# Patient Record
Sex: FEMALE | Race: WHITE | Employment: OTHER | URBAN - METROPOLITAN AREA
[De-identification: names, ages, dates, MRNs, and addresses within clinical notes are randomized per-mention and may not be internally consistent; named-entity substitution may affect disease eponyms.]

---

## 2017-02-07 ENCOUNTER — OFFICE VISIT (OUTPATIENT)
Dept: FAMILY MEDICINE CLINIC | Age: 67
End: 2017-02-07

## 2017-02-07 DIAGNOSIS — D22.9 ATYPICAL NEVI: ICD-10-CM

## 2017-02-07 DIAGNOSIS — I83.813 VARICOSE VEINS OF BOTH LOWER EXTREMITIES WITH PAIN: ICD-10-CM

## 2017-02-07 DIAGNOSIS — Z23 NEED FOR PROPHYLACTIC VACCINATION AGAINST STREPTOCOCCUS PNEUMONIAE (PNEUMOCOCCUS): ICD-10-CM

## 2017-02-07 DIAGNOSIS — G90.A POTS (POSTURAL ORTHOSTATIC TACHYCARDIA SYNDROME): Primary | ICD-10-CM

## 2017-02-07 DIAGNOSIS — Z12.11 SCREENING FOR COLON CANCER: ICD-10-CM

## 2017-02-07 DIAGNOSIS — Z13.820 OSTEOPOROSIS SCREENING: ICD-10-CM

## 2017-02-07 DIAGNOSIS — H65.02 ACUTE SEROUS OTITIS MEDIA OF LEFT EAR, RECURRENCE NOT SPECIFIED: ICD-10-CM

## 2017-02-07 DIAGNOSIS — Z12.31 ENCOUNTER FOR SCREENING MAMMOGRAM FOR BREAST CANCER: ICD-10-CM

## 2017-02-07 PROCEDURE — 90670 PCV13 VACCINE IM: CPT | Performed by: NURSE PRACTITIONER

## 2017-02-07 PROCEDURE — 99203 OFFICE O/P NEW LOW 30 MIN: CPT | Performed by: NURSE PRACTITIONER

## 2017-02-07 PROCEDURE — G0009 ADMIN PNEUMOCOCCAL VACCINE: HCPCS | Performed by: NURSE PRACTITIONER

## 2017-02-07 RX ORDER — CEFDINIR 300 MG/1
300 CAPSULE ORAL 2 TIMES DAILY
Qty: 20 CAPSULE | Refills: 0 | Status: SHIPPED | OUTPATIENT
Start: 2017-02-07 | End: 2017-02-17

## 2017-02-07 ASSESSMENT — ENCOUNTER SYMPTOMS
SWOLLEN GLANDS: 0
SINUS PRESSURE: 1
HOARSE VOICE: 1
SORE THROAT: 0
SHORTNESS OF BREATH: 0

## 2017-02-07 ASSESSMENT — PATIENT HEALTH QUESTIONNAIRE - PHQ9
1. LITTLE INTEREST OR PLEASURE IN DOING THINGS: 0
2. FEELING DOWN, DEPRESSED OR HOPELESS: 0
SUM OF ALL RESPONSES TO PHQ QUESTIONS 1-9: 0
SUM OF ALL RESPONSES TO PHQ9 QUESTIONS 1 & 2: 0

## 2017-02-08 DIAGNOSIS — Z12.11 SCREENING FOR COLON CANCER: Primary | ICD-10-CM

## 2017-02-14 ENCOUNTER — HOSPITAL ENCOUNTER (OUTPATIENT)
Dept: WOMENS IMAGING | Age: 67
Discharge: HOME | End: 2017-02-14
Payer: MEDICARE

## 2017-02-14 VITALS
DIASTOLIC BLOOD PRESSURE: 80 MMHG | SYSTOLIC BLOOD PRESSURE: 134 MMHG | RESPIRATION RATE: 16 BRPM | HEART RATE: 78 BPM | TEMPERATURE: 98.1 F | HEIGHT: 65 IN | OXYGEN SATURATION: 97 % | WEIGHT: 165 LBS | BODY MASS INDEX: 27.49 KG/M2

## 2017-02-14 DIAGNOSIS — Z13.820 OSTEOPOROSIS SCREENING: ICD-10-CM

## 2017-02-14 PROCEDURE — 77080 DXA BONE DENSITY AXIAL: CPT

## 2017-03-01 ENCOUNTER — PROCEDURE VISIT (OUTPATIENT)
Dept: FAMILY MEDICINE CLINIC | Age: 67
End: 2017-03-01

## 2017-03-01 VITALS
TEMPERATURE: 97 F | HEART RATE: 84 BPM | SYSTOLIC BLOOD PRESSURE: 124 MMHG | HEIGHT: 65 IN | DIASTOLIC BLOOD PRESSURE: 84 MMHG | RESPIRATION RATE: 16 BRPM

## 2017-03-01 DIAGNOSIS — D22.9 ATYPICAL NEVI: Primary | ICD-10-CM

## 2017-03-01 DIAGNOSIS — D22.9 ATYPICAL NEVI: ICD-10-CM

## 2017-03-01 PROCEDURE — 12002 RPR S/N/AX/GEN/TRNK2.6-7.5CM: CPT | Performed by: NURSE PRACTITIONER

## 2017-03-01 PROCEDURE — 11301 SHAVE SKIN LESION 0.6-1.0 CM: CPT | Performed by: NURSE PRACTITIONER

## 2017-03-01 RX ORDER — HYDROCODONE BITARTRATE AND ACETAMINOPHEN 5; 325 MG/1; MG/1
1 TABLET ORAL EVERY 6 HOURS PRN
Qty: 18 TABLET | Refills: 0 | Status: SHIPPED | OUTPATIENT
Start: 2017-03-01 | End: 2017-09-30 | Stop reason: ALTCHOICE

## 2017-03-01 RX ORDER — SULFAMETHOXAZOLE AND TRIMETHOPRIM 400; 80 MG/1; MG/1
1 TABLET ORAL 2 TIMES DAILY
Qty: 20 TABLET | Refills: 0 | Status: SHIPPED | OUTPATIENT
Start: 2017-03-01 | End: 2017-03-11

## 2017-03-01 RX ORDER — LIDOCAINE HYDROCHLORIDE AND EPINEPHRINE BITARTRATE 20; .01 MG/ML; MG/ML
3 INJECTION, SOLUTION SUBCUTANEOUS ONCE
Status: DISCONTINUED | OUTPATIENT
Start: 2017-03-01 | End: 2021-05-17

## 2017-03-08 ENCOUNTER — OFFICE VISIT (OUTPATIENT)
Dept: FAMILY MEDICINE CLINIC | Age: 67
End: 2017-03-08

## 2017-03-08 VITALS
RESPIRATION RATE: 16 BRPM | TEMPERATURE: 97.9 F | SYSTOLIC BLOOD PRESSURE: 112 MMHG | HEIGHT: 65 IN | HEART RATE: 80 BPM | DIASTOLIC BLOOD PRESSURE: 74 MMHG

## 2017-03-08 DIAGNOSIS — Z48.02 VISIT FOR SUTURE REMOVAL: ICD-10-CM

## 2017-03-08 DIAGNOSIS — M85.89 OSTEOPENIA OF MULTIPLE SITES: ICD-10-CM

## 2017-03-08 DIAGNOSIS — D04.9 BASAL CELL CARCINOMA IN SITU OF SKIN: Primary | ICD-10-CM

## 2017-03-08 PROCEDURE — 99212 OFFICE O/P EST SF 10 MIN: CPT | Performed by: NURSE PRACTITIONER

## 2017-03-08 RX ORDER — IBUPROFEN AND DIPHENHYDRAMINE HCL 200; 25 MG/1; MG/1
CAPSULE, LIQUID FILLED ORAL
Qty: 60 TABLET | Refills: 10 | Status: SHIPPED | OUTPATIENT
Start: 2017-03-08 | End: 2018-06-04

## 2017-03-10 ENCOUNTER — TELEPHONE (OUTPATIENT)
Dept: FAMILY MEDICINE CLINIC | Age: 67
End: 2017-03-10

## 2017-04-25 ENCOUNTER — TELEPHONE (OUTPATIENT)
Dept: FAMILY MEDICINE CLINIC | Age: 67
End: 2017-04-25

## 2017-04-25 DIAGNOSIS — M25.561 RECURRENT PAIN OF RIGHT KNEE: Primary | ICD-10-CM

## 2017-05-16 ENCOUNTER — HOSPITAL ENCOUNTER (OUTPATIENT)
Dept: ORTHOPEDIC SURGERY | Age: 67
Discharge: HOME | End: 2017-05-16
Payer: MEDICARE

## 2017-05-16 DIAGNOSIS — M25.561 ARTHRALGIA OF RIGHT LOWER LEG: ICD-10-CM

## 2017-05-16 PROCEDURE — 73564 X-RAY EXAM KNEE 4 OR MORE: CPT

## 2017-09-20 LAB — PATHOLOGY REPORT: NORMAL

## 2018-02-06 ENCOUNTER — OFFICE VISIT (OUTPATIENT)
Dept: FAMILY MEDICINE CLINIC | Age: 68
End: 2018-02-06
Payer: MEDICARE

## 2018-02-06 VITALS
BODY MASS INDEX: 28.93 KG/M2 | OXYGEN SATURATION: 98 % | HEART RATE: 81 BPM | HEIGHT: 63 IN | RESPIRATION RATE: 18 BRPM | WEIGHT: 163.3 LBS | SYSTOLIC BLOOD PRESSURE: 148 MMHG | DIASTOLIC BLOOD PRESSURE: 88 MMHG | TEMPERATURE: 97.7 F

## 2018-02-06 DIAGNOSIS — H61.22 IMPACTED CERUMEN OF LEFT EAR: ICD-10-CM

## 2018-02-06 DIAGNOSIS — J01.90 ACUTE SINUSITIS, RECURRENCE NOT SPECIFIED, UNSPECIFIED LOCATION: Primary | ICD-10-CM

## 2018-02-06 DIAGNOSIS — H60.502 ACUTE OTITIS EXTERNA OF LEFT EAR, UNSPECIFIED TYPE: ICD-10-CM

## 2018-02-06 DIAGNOSIS — J20.9 ACUTE BRONCHITIS, UNSPECIFIED ORGANISM: ICD-10-CM

## 2018-02-06 LAB
INFLUENZA A ANTIBODY: NORMAL
INFLUENZA B ANTIBODY: NORMAL

## 2018-02-06 PROCEDURE — 3017F COLORECTAL CA SCREEN DOC REV: CPT | Performed by: FAMILY MEDICINE

## 2018-02-06 PROCEDURE — 4040F PNEUMOC VAC/ADMIN/RCVD: CPT | Performed by: FAMILY MEDICINE

## 2018-02-06 PROCEDURE — G8484 FLU IMMUNIZE NO ADMIN: HCPCS | Performed by: FAMILY MEDICINE

## 2018-02-06 PROCEDURE — 1090F PRES/ABSN URINE INCON ASSESS: CPT | Performed by: FAMILY MEDICINE

## 2018-02-06 PROCEDURE — 1123F ACP DISCUSS/DSCN MKR DOCD: CPT | Performed by: FAMILY MEDICINE

## 2018-02-06 PROCEDURE — 99213 OFFICE O/P EST LOW 20 MIN: CPT | Performed by: FAMILY MEDICINE

## 2018-02-06 PROCEDURE — 3014F SCREEN MAMMO DOC REV: CPT | Performed by: FAMILY MEDICINE

## 2018-02-06 PROCEDURE — 4130F TOPICAL PREP RX AOE: CPT | Performed by: FAMILY MEDICINE

## 2018-02-06 PROCEDURE — 87804 INFLUENZA ASSAY W/OPTIC: CPT | Performed by: FAMILY MEDICINE

## 2018-02-06 PROCEDURE — G8419 CALC BMI OUT NRM PARAM NOF/U: HCPCS | Performed by: FAMILY MEDICINE

## 2018-02-06 PROCEDURE — G8399 PT W/DXA RESULTS DOCUMENT: HCPCS | Performed by: FAMILY MEDICINE

## 2018-02-06 PROCEDURE — 1036F TOBACCO NON-USER: CPT | Performed by: FAMILY MEDICINE

## 2018-02-06 PROCEDURE — G8427 DOCREV CUR MEDS BY ELIG CLIN: HCPCS | Performed by: FAMILY MEDICINE

## 2018-02-06 RX ORDER — AZITHROMYCIN 250 MG/1
TABLET, FILM COATED ORAL
Qty: 1 PACKET | Refills: 0 | Status: SHIPPED | OUTPATIENT
Start: 2018-02-06 | End: 2018-02-16

## 2018-02-06 ASSESSMENT — ENCOUNTER SYMPTOMS
SHORTNESS OF BREATH: 0
ABDOMINAL PAIN: 0
TROUBLE SWALLOWING: 0

## 2018-02-06 NOTE — PROGRESS NOTES
Left Arm, Position: Sitting, Cuff Size: Large Adult)   Pulse 81   Temp 97.7 °F (36.5 °C) (Tympanic)   Resp 18   Ht 5' 3\" (1.6 m)   Wt 163 lb 4.8 oz (74.1 kg)   SpO2 98%   Breastfeeding? No   BMI 28.93 kg/m²     Physical Exam  Heent: T.M normal bilat although left T.M partially occluded with cerumen. Mild pharyngeal injection. No pharyngeal  exudate. Slight tenderness along mastoid of left side. no swelling. Mild tragus tenderness on left without canal swelling or exudate of canal.                    Nares patent but swollen mucosa noted  Neck: Minimal anter cervical adenopathy. No masses. No thyroid asymmetry. Lungs: Clear equal breath sounds. No wheezes or rales. Heart: Rate reg No murmur  Gen: In no acute distress  Assessment:      1. Acute sinusitis, recurrence not specified, unspecified location     2. Acute bronchitis, unspecified organism     3. Impacted cerumen of left ear     4. Acute otitis externa of left ear, unspecified type           Plan:      Orders Placed This Encounter   Procedures    POCT Influenza A/B     Orders Placed This Encounter   Medications    azithromycin (ZITHROMAX) 250 MG tablet     Sig: Take 2 tabs (500 mg) on Day 1, and take 1 tab (250 mg) on days 2 through 5.      Dispense:  1 packet     Refill:  0    neomycin-colistin-hydrocortisone-thonzonium (CORTISPORIN-TC) 3.3-3-10-0.5 MG/ML otic suspension     Sig: Place 4 drops into the left ear 3 times daily for 10 days     Dispense:  1 Bottle     Refill:  0   f/u in 10 days if needed with primary

## 2018-04-16 PROBLEM — L81.4 LENTIGO: Status: ACTIVE | Noted: 2017-04-18

## 2018-04-17 ENCOUNTER — OFFICE VISIT (OUTPATIENT)
Dept: FAMILY MEDICINE CLINIC | Age: 68
End: 2018-04-17
Payer: MEDICARE

## 2018-04-17 ENCOUNTER — TELEPHONE (OUTPATIENT)
Dept: FAMILY MEDICINE CLINIC | Age: 68
End: 2018-04-17

## 2018-04-17 VITALS
DIASTOLIC BLOOD PRESSURE: 86 MMHG | SYSTOLIC BLOOD PRESSURE: 138 MMHG | WEIGHT: 168 LBS | HEIGHT: 63 IN | BODY MASS INDEX: 29.77 KG/M2 | HEART RATE: 60 BPM

## 2018-04-17 DIAGNOSIS — L23.7 PLANT ALLERGIC CONTACT DERMATITIS: ICD-10-CM

## 2018-04-17 DIAGNOSIS — Z23 NEED FOR PNEUMOCOCCAL VACCINATION: ICD-10-CM

## 2018-04-17 DIAGNOSIS — F43.23 ADJUSTMENT DISORDER WITH MIXED ANXIETY AND DEPRESSED MOOD: Primary | ICD-10-CM

## 2018-04-17 PROCEDURE — 99214 OFFICE O/P EST MOD 30 MIN: CPT | Performed by: NURSE PRACTITIONER

## 2018-04-17 PROCEDURE — 4040F PNEUMOC VAC/ADMIN/RCVD: CPT | Performed by: NURSE PRACTITIONER

## 2018-04-17 PROCEDURE — 1123F ACP DISCUSS/DSCN MKR DOCD: CPT | Performed by: NURSE PRACTITIONER

## 2018-04-17 PROCEDURE — G8427 DOCREV CUR MEDS BY ELIG CLIN: HCPCS | Performed by: NURSE PRACTITIONER

## 2018-04-17 PROCEDURE — 1090F PRES/ABSN URINE INCON ASSESS: CPT | Performed by: NURSE PRACTITIONER

## 2018-04-17 PROCEDURE — 90732 PPSV23 VACC 2 YRS+ SUBQ/IM: CPT | Performed by: NURSE PRACTITIONER

## 2018-04-17 PROCEDURE — G0009 ADMIN PNEUMOCOCCAL VACCINE: HCPCS | Performed by: NURSE PRACTITIONER

## 2018-04-17 PROCEDURE — G8399 PT W/DXA RESULTS DOCUMENT: HCPCS | Performed by: NURSE PRACTITIONER

## 2018-04-17 PROCEDURE — 3017F COLORECTAL CA SCREEN DOC REV: CPT | Performed by: NURSE PRACTITIONER

## 2018-04-17 PROCEDURE — G8419 CALC BMI OUT NRM PARAM NOF/U: HCPCS | Performed by: NURSE PRACTITIONER

## 2018-04-17 PROCEDURE — 1036F TOBACCO NON-USER: CPT | Performed by: NURSE PRACTITIONER

## 2018-04-17 RX ORDER — VILAZODONE HYDROCHLORIDE 20 MG/1
20 TABLET ORAL DAILY
Qty: 30 TABLET | Refills: 5 | Status: SHIPPED | OUTPATIENT
Start: 2018-04-17 | End: 2018-06-04

## 2018-04-17 RX ORDER — FLUOXETINE 20 MG/1
20 TABLET, FILM COATED ORAL DAILY
Qty: 30 TABLET | Refills: 3 | Status: SHIPPED | OUTPATIENT
Start: 2018-04-17 | End: 2018-06-04

## 2018-04-17 ASSESSMENT — PATIENT HEALTH QUESTIONNAIRE - PHQ9
SUM OF ALL RESPONSES TO PHQ QUESTIONS 1-9: 2
2. FEELING DOWN, DEPRESSED OR HOPELESS: 1
SUM OF ALL RESPONSES TO PHQ9 QUESTIONS 1 & 2: 2
1. LITTLE INTEREST OR PLEASURE IN DOING THINGS: 1

## 2018-04-20 RX ORDER — CITALOPRAM 10 MG/1
10 TABLET ORAL DAILY
Qty: 30 TABLET | Refills: 3 | Status: SHIPPED | OUTPATIENT
Start: 2018-04-20 | End: 2018-04-20 | Stop reason: SDUPTHER

## 2018-04-20 RX ORDER — CITALOPRAM 10 MG/1
10 TABLET ORAL DAILY
Qty: 90 TABLET | Refills: 3 | Status: SHIPPED | OUTPATIENT
Start: 2018-04-20 | End: 2018-08-17 | Stop reason: SDUPTHER

## 2018-04-25 ASSESSMENT — ENCOUNTER SYMPTOMS: CHEST TIGHTNESS: 0

## 2018-05-27 ENCOUNTER — OFFICE VISIT (OUTPATIENT)
Dept: FAMILY MEDICINE CLINIC | Age: 68
End: 2018-05-27
Payer: MEDICARE

## 2018-05-27 VITALS
OXYGEN SATURATION: 98 % | BODY MASS INDEX: 30.12 KG/M2 | HEART RATE: 69 BPM | HEIGHT: 63 IN | DIASTOLIC BLOOD PRESSURE: 70 MMHG | WEIGHT: 170 LBS | SYSTOLIC BLOOD PRESSURE: 122 MMHG | TEMPERATURE: 98.6 F

## 2018-05-27 DIAGNOSIS — T78.40XA ALLERGIC REACTION, INITIAL ENCOUNTER: Primary | ICD-10-CM

## 2018-05-27 PROCEDURE — 1090F PRES/ABSN URINE INCON ASSESS: CPT | Performed by: NURSE PRACTITIONER

## 2018-05-27 PROCEDURE — G8399 PT W/DXA RESULTS DOCUMENT: HCPCS | Performed by: NURSE PRACTITIONER

## 2018-05-27 PROCEDURE — 96372 THER/PROPH/DIAG INJ SC/IM: CPT | Performed by: NURSE PRACTITIONER

## 2018-05-27 PROCEDURE — 4040F PNEUMOC VAC/ADMIN/RCVD: CPT | Performed by: NURSE PRACTITIONER

## 2018-05-27 PROCEDURE — 99213 OFFICE O/P EST LOW 20 MIN: CPT | Performed by: NURSE PRACTITIONER

## 2018-05-27 PROCEDURE — 1123F ACP DISCUSS/DSCN MKR DOCD: CPT | Performed by: NURSE PRACTITIONER

## 2018-05-27 PROCEDURE — 1036F TOBACCO NON-USER: CPT | Performed by: NURSE PRACTITIONER

## 2018-05-27 PROCEDURE — 3017F COLORECTAL CA SCREEN DOC REV: CPT | Performed by: NURSE PRACTITIONER

## 2018-05-27 PROCEDURE — G8427 DOCREV CUR MEDS BY ELIG CLIN: HCPCS | Performed by: NURSE PRACTITIONER

## 2018-05-27 PROCEDURE — G8417 CALC BMI ABV UP PARAM F/U: HCPCS | Performed by: NURSE PRACTITIONER

## 2018-05-27 RX ORDER — TRIAMCINOLONE ACETONIDE 40 MG/ML
40 INJECTION, SUSPENSION INTRA-ARTICULAR; INTRAMUSCULAR ONCE
Status: COMPLETED | OUTPATIENT
Start: 2018-05-27 | End: 2018-05-27

## 2018-05-27 RX ADMIN — TRIAMCINOLONE ACETONIDE 40 MG: 40 INJECTION, SUSPENSION INTRA-ARTICULAR; INTRAMUSCULAR at 13:34

## 2018-05-27 ASSESSMENT — ENCOUNTER SYMPTOMS
CHANGE IN BOWEL HABIT: 0
VOMITING: 0

## 2018-06-04 ENCOUNTER — OFFICE VISIT (OUTPATIENT)
Dept: FAMILY MEDICINE CLINIC | Age: 68
End: 2018-06-04
Payer: MEDICARE

## 2018-06-04 VITALS
HEART RATE: 78 BPM | WEIGHT: 153 LBS | DIASTOLIC BLOOD PRESSURE: 84 MMHG | SYSTOLIC BLOOD PRESSURE: 128 MMHG | RESPIRATION RATE: 20 BRPM | BODY MASS INDEX: 27.11 KG/M2 | TEMPERATURE: 98.4 F | OXYGEN SATURATION: 99 % | HEIGHT: 63 IN

## 2018-06-04 DIAGNOSIS — J01.90 ACUTE SINUSITIS, RECURRENCE NOT SPECIFIED, UNSPECIFIED LOCATION: ICD-10-CM

## 2018-06-04 DIAGNOSIS — M85.89 OSTEOPENIA OF MULTIPLE SITES: ICD-10-CM

## 2018-06-04 DIAGNOSIS — J20.9 ACUTE BRONCHITIS, UNSPECIFIED ORGANISM: Primary | ICD-10-CM

## 2018-06-04 DIAGNOSIS — G90.A POTS (POSTURAL ORTHOSTATIC TACHYCARDIA SYNDROME): ICD-10-CM

## 2018-06-04 DIAGNOSIS — J98.9 REACTIVE AIRWAY DISEASE THAT IS NOT ASTHMA: ICD-10-CM

## 2018-06-04 PROCEDURE — 99214 OFFICE O/P EST MOD 30 MIN: CPT | Performed by: NURSE PRACTITIONER

## 2018-06-04 PROCEDURE — G8399 PT W/DXA RESULTS DOCUMENT: HCPCS | Performed by: NURSE PRACTITIONER

## 2018-06-04 PROCEDURE — G8417 CALC BMI ABV UP PARAM F/U: HCPCS | Performed by: NURSE PRACTITIONER

## 2018-06-04 PROCEDURE — 3017F COLORECTAL CA SCREEN DOC REV: CPT | Performed by: NURSE PRACTITIONER

## 2018-06-04 PROCEDURE — 1036F TOBACCO NON-USER: CPT | Performed by: NURSE PRACTITIONER

## 2018-06-04 PROCEDURE — 1123F ACP DISCUSS/DSCN MKR DOCD: CPT | Performed by: NURSE PRACTITIONER

## 2018-06-04 PROCEDURE — 1090F PRES/ABSN URINE INCON ASSESS: CPT | Performed by: NURSE PRACTITIONER

## 2018-06-04 PROCEDURE — G8427 DOCREV CUR MEDS BY ELIG CLIN: HCPCS | Performed by: NURSE PRACTITIONER

## 2018-06-04 PROCEDURE — 4040F PNEUMOC VAC/ADMIN/RCVD: CPT | Performed by: NURSE PRACTITIONER

## 2018-06-04 RX ORDER — MOXIFLOXACIN HYDROCHLORIDE 400 MG/1
400 TABLET ORAL DAILY
Qty: 10 TABLET | Refills: 0 | Status: SHIPPED | OUTPATIENT
Start: 2018-06-04 | End: 2018-06-14

## 2018-06-04 RX ORDER — FLUTICASONE PROPIONATE 50 MCG
2 SPRAY, SUSPENSION (ML) NASAL DAILY
Qty: 1 BOTTLE | Refills: 3 | Status: SHIPPED | OUTPATIENT
Start: 2018-06-04 | End: 2018-06-04 | Stop reason: SDUPTHER

## 2018-06-04 RX ORDER — METHYLPREDNISOLONE 4 MG/1
TABLET ORAL
Qty: 1 KIT | Refills: 0 | Status: SHIPPED | OUTPATIENT
Start: 2018-06-04 | End: 2018-06-10

## 2018-06-04 RX ORDER — ALBUTEROL SULFATE 90 UG/1
2 AEROSOL, METERED RESPIRATORY (INHALATION) EVERY 6 HOURS PRN
Qty: 1 INHALER | Refills: 0 | Status: SHIPPED | OUTPATIENT
Start: 2018-06-04 | End: 2021-02-19 | Stop reason: ALTCHOICE

## 2018-06-04 RX ORDER — GUAIFENESIN AND CODEINE PHOSPHATE 100; 10 MG/5ML; MG/5ML
5 SOLUTION ORAL 4 TIMES DAILY PRN
Qty: 118 ML | Refills: 0 | Status: SHIPPED | OUTPATIENT
Start: 2018-06-04 | End: 2018-06-11

## 2018-06-04 RX ORDER — FLUTICASONE PROPIONATE 50 MCG
1 SPRAY, SUSPENSION (ML) NASAL DAILY
Qty: 1 BOTTLE | Refills: 0 | Status: SHIPPED | OUTPATIENT
Start: 2018-06-04 | End: 2019-05-10 | Stop reason: SDUPTHER

## 2018-06-04 RX ORDER — FLUTICASONE PROPIONATE 50 MCG
SPRAY, SUSPENSION (ML) NASAL
Refills: 3 | OUTPATIENT
Start: 2018-06-04

## 2018-06-04 ASSESSMENT — ENCOUNTER SYMPTOMS
SORE THROAT: 0
COUGH: 1
HOARSE VOICE: 0
VOMITING: 0
SHORTNESS OF BREATH: 0
SINUS PRESSURE: 1
NAUSEA: 0
SINUS PAIN: 0
SWOLLEN GLANDS: 0
WHEEZING: 1

## 2018-08-08 ENCOUNTER — HOSPITAL ENCOUNTER (OUTPATIENT)
Dept: ORTHOPEDIC SURGERY | Age: 68
Discharge: HOME | End: 2018-08-10
Payer: MEDICARE

## 2018-08-08 DIAGNOSIS — M25.519 ARTHRALGIA OF SHOULDER, UNSPECIFIED LATERALITY: ICD-10-CM

## 2018-08-08 PROCEDURE — 73030 X-RAY EXAM OF SHOULDER: CPT

## 2018-08-17 RX ORDER — CITALOPRAM 10 MG/1
10 TABLET ORAL DAILY
Qty: 90 TABLET | Refills: 3 | Status: SHIPPED | OUTPATIENT
Start: 2018-08-17 | End: 2019-08-20 | Stop reason: SDUPTHER

## 2018-08-24 ENCOUNTER — HOSPITAL ENCOUNTER (OUTPATIENT)
Dept: ORTHOPEDIC SURGERY | Age: 68
Discharge: HOME | End: 2018-08-26
Payer: MEDICARE

## 2018-08-24 DIAGNOSIS — M25.561 ARTHRALGIA OF RIGHT LOWER LEG: ICD-10-CM

## 2018-08-24 PROCEDURE — 73564 X-RAY EXAM KNEE 4 OR MORE: CPT

## 2018-08-27 ENCOUNTER — HOSPITAL ENCOUNTER (OUTPATIENT)
Dept: PHYSICAL THERAPY | Age: 68
Setting detail: THERAPIES SERIES
Discharge: HOME | End: 2018-08-27
Payer: MEDICARE

## 2018-08-27 PROCEDURE — G0283 ELEC STIM OTHER THAN WOUND: HCPCS

## 2018-08-27 PROCEDURE — 97162 PT EVAL MOD COMPLEX 30 MIN: CPT

## 2018-08-27 PROCEDURE — 97110 THERAPEUTIC EXERCISES: CPT

## 2018-08-27 PROCEDURE — G8985 CARRY GOAL STATUS: HCPCS

## 2018-08-27 PROCEDURE — G8984 CARRY CURRENT STATUS: HCPCS

## 2018-08-27 ASSESSMENT — PAIN DESCRIPTION - PAIN TYPE: TYPE: ACUTE PAIN

## 2018-08-27 ASSESSMENT — PAIN DESCRIPTION - LOCATION: LOCATION: SHOULDER

## 2018-08-27 ASSESSMENT — PAIN SCALES - GENERAL: PAINLEVEL_OUTOF10: 5

## 2018-08-27 ASSESSMENT — PAIN DESCRIPTION - ORIENTATION: ORIENTATION: RIGHT

## 2018-08-27 ASSESSMENT — PAIN DESCRIPTION - DESCRIPTORS: DESCRIPTORS: ACHING

## 2018-08-27 NOTE — PROGRESS NOTES
[address]  PHYSICAL THERAPY EVALUATION    Date: 2018  Patient Name: Rodrigue Mazariegos       MRN: [id number]   Account: [de-identified]   : 1950  (76 y.o.)   Gender: female   Referring Practitioner: Dhaval Lynne                 Diagnosis: R Shldr Bursistis ( s/p injection 18)  Treatment Diagnosis: R Shldr Pain; Impaired Strength; Impaired ROM                Past Medical History:  has no past medical history on file. Past Surgical History:   has no past surgical history on file. 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 Additional Comments: denies falls        Subjective:  Subjective: Over 1 mo of sudden onset of R shldr Pain; Pain in R UT, suprascap area, next day unable to lift arm, tried DTM, sume tnederness in deltoid area; Needs to hold UT when coughs or sneezes; Injection into (subacromial bursa) w/ improved ROM but cont difficulty raising arm w/ holding anything. Comments: Pt works as  assistant at [employer]: GHJ: Flex 4-/5; Abd 4/5;  ER 4-/5;  IR 4+/5     Strength Other  Other: R: Rhmbd 3+/5;  MT 4/5;  LT 4/5 ;   L: Rhmbd, MT 4-/5. LT 4/5                PROM RUE (degrees)  RUE General PROM: GHJ flex and ABD WFL      AROM RUE (degrees)  RUE General AROM: GHJ: Flex 134;   Abd 105; and Handling Objects Current Status (): At least 20 percent but less than 40 percent impaired, limited or restricted  Carrying, Moving and Handling Objects Goal Status (): At least 1 percent but less than 20 percent impaired, limited or restricted    Patient Education  New Education Provided: written HEP    POST-PAIN     Pain Rating (0-10 pain scale):  5 /10  Location and pain description same as pre-treatment unless indicated. Action: [] NA  [] Call Physician  [] Perform HEP  [] Meds as prescribed    Evaluation and patient rights have been reviewed and patient agrees with plan of care. Yes  [x]  No  []   Explain:       Sommers Fall Risk Assessment  Risk Factor Scale  Score   History of Falls [] Yes  [] No 25  0 0   Secondary Diagnosis [] Yes  [] No 15  0 0   Ambulatory Aid [] Furniture  [] Crutches/cane/walker  [] None/bedrest/wheelchair/nurse 30  15  0 0   IV/Heparin Lock [] Yes  [] No 20  0 0   Gait/Transferring [] Impaired  [] Weak  [] Normal/bedrest/immobile 20  10  0 0   Mental Status [] Forgets limitations  [] Oriented to own ability 15  0 0      Total:0     Based on the Assessment score: check the appropriate box. [x]  No intervention needed   Low =   Score of 0-24  []  Use standard prevention interventions Moderate =  Score of 24-44   [] Discuss fall prevention strategies   [] Indicate moderate falls risk on eval  []  Use high risk prevention interventions High = Score of 45 and higher   [] Discuss fall prevention strategies   [] Provide supervision during treatment time    Goals  Short term goals  Time Frame for Short term goals: 4 visits   Short term goal 1: I w/ initial HEP  Long term goals  Time Frame for Long term goals : 8-10 visits  Long term goal 1: Dec R shldr pain to </= 4/10 at worse  Long term goal 2:  Inc R GHJ Flex and ABD to >/= 140 and ER to >/= 70 deg  to improve overhead and job duties  Long term goal 3: inc strength R GHJ to >/= 4+/5 flex, Abd, ER to improve household duties  Long term goal 4: Inc strength B rhmbd,and MT to >/= 4/5 to dec pain w. overhead activites  Long term goal 5:  Inc UEFI to >/= 70/80         PT Individual Minutes  Time In: 5993  Time Out: 1001  Minutes: 58  Timed Code Treatment Minutes: 9 Minutes  Procedure Minutes:41    Electronically signed by Torres Amezcua, PT on 8/27/18 at 12:26 PM

## 2018-08-31 ENCOUNTER — HOSPITAL ENCOUNTER (OUTPATIENT)
Dept: PHYSICAL THERAPY | Age: 68
Setting detail: THERAPIES SERIES
Discharge: HOME | End: 2018-08-31
Payer: MEDICARE

## 2018-08-31 PROCEDURE — G0283 ELEC STIM OTHER THAN WOUND: HCPCS

## 2018-08-31 PROCEDURE — 97110 THERAPEUTIC EXERCISES: CPT

## 2018-08-31 PROCEDURE — 97140 MANUAL THERAPY 1/> REGIONS: CPT

## 2018-08-31 ASSESSMENT — PAIN DESCRIPTION - LOCATION: LOCATION: SHOULDER

## 2018-08-31 ASSESSMENT — PAIN DESCRIPTION - DESCRIPTORS: DESCRIPTORS: ACHING

## 2018-08-31 ASSESSMENT — PAIN SCALES - GENERAL: PAINLEVEL_OUTOF10: 8

## 2018-08-31 ASSESSMENT — PAIN DESCRIPTION - PAIN TYPE: TYPE: ACUTE PAIN

## 2018-08-31 ASSESSMENT — PAIN DESCRIPTION - ORIENTATION: ORIENTATION: RIGHT

## 2018-09-07 ENCOUNTER — HOSPITAL ENCOUNTER (OUTPATIENT)
Dept: PHYSICAL THERAPY | Age: 68
Setting detail: THERAPIES SERIES
Discharge: HOME | End: 2018-09-07
Payer: MEDICARE

## 2018-09-07 PROCEDURE — 97110 THERAPEUTIC EXERCISES: CPT

## 2018-09-07 PROCEDURE — G0283 ELEC STIM OTHER THAN WOUND: HCPCS

## 2018-09-07 ASSESSMENT — PAIN DESCRIPTION - ORIENTATION: ORIENTATION: RIGHT

## 2018-09-07 ASSESSMENT — PAIN DESCRIPTION - DESCRIPTORS: DESCRIPTORS: ACHING

## 2018-09-07 ASSESSMENT — PAIN DESCRIPTION - LOCATION: LOCATION: SHOULDER

## 2018-09-07 ASSESSMENT — PAIN SCALES - GENERAL: PAINLEVEL_OUTOF10: 4

## 2018-09-07 ASSESSMENT — PAIN DESCRIPTION - PAIN TYPE: TYPE: ACUTE PAIN

## 2018-09-10 ENCOUNTER — HOSPITAL ENCOUNTER (OUTPATIENT)
Dept: PHYSICAL THERAPY | Age: 68
Setting detail: THERAPIES SERIES
Discharge: HOME | End: 2018-09-10
Payer: MEDICARE

## 2018-09-14 ENCOUNTER — HOSPITAL ENCOUNTER (OUTPATIENT)
Dept: PHYSICAL THERAPY | Age: 68
Setting detail: THERAPIES SERIES
Discharge: HOME | End: 2018-09-14
Payer: MEDICARE

## 2018-09-14 PROCEDURE — 97110 THERAPEUTIC EXERCISES: CPT

## 2018-09-17 ENCOUNTER — HOSPITAL ENCOUNTER (OUTPATIENT)
Dept: PHYSICAL THERAPY | Age: 68
Setting detail: THERAPIES SERIES
Discharge: HOME | End: 2018-09-17
Payer: MEDICARE

## 2018-09-21 ENCOUNTER — HOSPITAL ENCOUNTER (OUTPATIENT)
Dept: PHYSICAL THERAPY | Age: 68
Setting detail: THERAPIES SERIES
Discharge: HOME | End: 2018-09-21
Payer: MEDICARE

## 2018-09-21 PROCEDURE — 97110 THERAPEUTIC EXERCISES: CPT

## 2018-09-21 PROCEDURE — G0283 ELEC STIM OTHER THAN WOUND: HCPCS

## 2018-09-21 PROCEDURE — G8986 CARRY D/C STATUS: HCPCS

## 2018-09-21 PROCEDURE — G8985 CARRY GOAL STATUS: HCPCS

## 2018-09-21 NOTE — PROGRESS NOTES
[address]     Ph: [phone]  Fax: [phone]    [] Certification  [] Recertification []  Plan of Care  [] Progress Note [x] Discharge      To:  Referring Practitioner: Michael Asencio      From:   Dari Marcelo, PT  Patient: Elissa Carrasco     : 1950  Diagnosis: R Shldr Bursistis ( s/p injection 18)     XXHK:4-10-20 for DOS  2018  Treatment Diagnosis: R Shldr Pain; Impaired Strength; Impaired ROM    Plan of Care/Certification Expiration Date: 18  Progress Report Period from:  2018  to 2018    Total # of Visits to Date: 5   No Show: 0    Canceled Appointment: 2     OBJECTIVE:   Short Term Goals - Time Frame for Short term goals: 4 visits     Goals Current/Discharge status  Met   Short term goal 1: I w/ initial HEP  Compliant with HEP [x] yes  [] no     Long Term Goals - Time Frame for Long term goals : 8-10 visits  Goals Current/ Discharge status Met   Long term goal 1: Dec R shldr pain to </= 4/10 at worse Pain recently is 0-1/10 [] yes  [] no   Long term goal 2: Inc R GHJ Flex and ABD to >/= 140 and ER to >/= 70 deg  to improve overhead and job duties      AROM is WNL, right = left                                              [] yes  [] no   Long term goal 3: inc strength R GHJ to >/= 4+/5 flex, Abd, ER to improve household duties       Strength RUE  Comment: GHJ, flex, abd, 4/5, ER 4-/5, IR 4+/5,      Strength Other  Other: right,  rhomboid, mid trap 4-/5 [] yes  [x] no   Long term goal 4: Inc strength B rhmbd,and MT to >/= 4/5 to dec pain w. overhead activites See above [] yes  [x] no   Long term goal 5: Inc UEFI to >/= 70/80 UEFI=76/80 [x] yes  [] no           ASSESSMENT: Pt demo's improved GHJ AROM and improved GHJ and scap stab strength w/ deficits remaining; Despite remaining deficits, pt reports improved function and requested D/C. PT believes pt would have benefited from continuing  w/ POC to progress strength. G-Codes  PT G-Codes  Functional Assessment Tool Used: UEFI=76/80  Score: 76/80 = 95%  Functional Limitation: Carrying, moving and handling objects  Carrying, Moving and Handling Objects Goal Status (): At least 1 percent but less than 20 percent impaired, limited or restricted  Carrying, Moving and Handling Objects Discharge Status (): At least 1 percent but less than 20 percent impaired, limited or restricted  Plan  Plan Comment: D/C per patient request     Precautions:             ?     Patient Status:[] Continue/ Initiate plan of Care    [x] Discharge PT. Recommend pt continue with HEP. [] Additional visits requested, Please re-certify for additional visits:          Signature: objective info byElectronically signed by Roman Carroll PTA on 9/21/18 at 1:35 PM    Electronically signed by Regla Issa PT on 9/24/2018 at 1:25 PM    If you have any questions or concerns, please don't hesitate to call. Thank you for your referral.    I have reviewed this plan of care and certify a need for medically necessary rehabilitation services.     Physician Signature:__________________________________________________________  Date:  Please sign and return

## 2018-09-21 NOTE — PROGRESS NOTES
[address] [address]  Outpatient Physical Therapy    Treatment Note        Date: 2018  Patient: Vero Josue  : 1950  ACCT #: [de-identified]  Referring Practitioner: Argenis Zarco  Diagnosis: R Shldr Bursistis ( s/p injection 18)    Visit Information:  PT Visit Information  Onset Date: 18  PT Insurance Information: [facility] Medicare; 25.00 co-pay  Total # of Visits to Date: 5  Plan of Care/Certification Expiration Date: 18  No Show: 0  Canceled Appointment: 2  Progress Note Counter: 5/10    Subjective:  pain today is 1-2/10, I would like to D/C today, I feel almost 100% functional  Comments: D/C per patient request  HEP Compliance:  [x] Good [] Fair [] Poor [] Reports not doing due to:    Vital Signs  Patient Currently in Pain: Denies   Pain Screening  Patient Currently in Pain: Denies    OBJECTIVE:   Exercises  Exercise 1: pulleys 4 minutes  Exercise 2: 6-way shoulder ex x 10, YTB  Exercise 4: scapular retraction 5s x 15  Strength: [] NT  [x] MMT completed:        Strength RUE  Comment: GHJ, flex, abd, 4/5, ER 4-/5, IR 4+/5,      Strength Other  Other: right,  rhomboid, mid trap 4-/5    ROM: [] NT  [x] ROM measurements:         AROM RUE (degrees)  RUE General AROM: WNL, R=L      Modalities:  Modalities  Cryotherapy (Minutes\Location): 10 mins to R shldr during E-stim  E-stim (parameters): IFC R UT to med deltoid  and infraspinatus to ant deltoid x 10 mins in sitting     *Indicates exercise, modality, or manual techniques to be initiated when appropriate    Assessment: Body structures, Functions, Activity limitations: Decreased functional mobility , Decreased ADL status, Decreased ROM, Decreased strength, Decreased high-level IADLs  Assessment: shortened session due to late arrival, some difficulty, technique wise, with 6-way shoulder ex, YTB, no pain increase, just fatigue, good overall jose, conclude with e-stim/CP to reduce pain  Treatment Diagnosis: R Shldr Pain;  Impaired

## 2019-02-15 ENCOUNTER — OFFICE VISIT (OUTPATIENT)
Dept: FAMILY MEDICINE CLINIC | Age: 69
End: 2019-02-15
Payer: MEDICARE

## 2019-02-15 VITALS
DIASTOLIC BLOOD PRESSURE: 70 MMHG | TEMPERATURE: 98.4 F | RESPIRATION RATE: 16 BRPM | SYSTOLIC BLOOD PRESSURE: 130 MMHG | HEART RATE: 94 BPM | WEIGHT: 174 LBS | BODY MASS INDEX: 30.83 KG/M2 | HEIGHT: 63 IN

## 2019-02-15 DIAGNOSIS — B96.89 ACUTE BACTERIAL SINUSITIS: Primary | ICD-10-CM

## 2019-02-15 DIAGNOSIS — J01.90 ACUTE BACTERIAL SINUSITIS: Primary | ICD-10-CM

## 2019-02-15 DIAGNOSIS — J04.0 LARYNGITIS, ACUTE: ICD-10-CM

## 2019-02-15 PROCEDURE — 1123F ACP DISCUSS/DSCN MKR DOCD: CPT | Performed by: NURSE PRACTITIONER

## 2019-02-15 PROCEDURE — G8427 DOCREV CUR MEDS BY ELIG CLIN: HCPCS | Performed by: NURSE PRACTITIONER

## 2019-02-15 PROCEDURE — G8417 CALC BMI ABV UP PARAM F/U: HCPCS | Performed by: NURSE PRACTITIONER

## 2019-02-15 PROCEDURE — G8399 PT W/DXA RESULTS DOCUMENT: HCPCS | Performed by: NURSE PRACTITIONER

## 2019-02-15 PROCEDURE — 4040F PNEUMOC VAC/ADMIN/RCVD: CPT | Performed by: NURSE PRACTITIONER

## 2019-02-15 PROCEDURE — 1090F PRES/ABSN URINE INCON ASSESS: CPT | Performed by: NURSE PRACTITIONER

## 2019-02-15 PROCEDURE — 99213 OFFICE O/P EST LOW 20 MIN: CPT | Performed by: NURSE PRACTITIONER

## 2019-02-15 PROCEDURE — G8484 FLU IMMUNIZE NO ADMIN: HCPCS | Performed by: NURSE PRACTITIONER

## 2019-02-15 PROCEDURE — 1101F PT FALLS ASSESS-DOCD LE1/YR: CPT | Performed by: NURSE PRACTITIONER

## 2019-02-15 PROCEDURE — 3017F COLORECTAL CA SCREEN DOC REV: CPT | Performed by: NURSE PRACTITIONER

## 2019-02-15 PROCEDURE — 1036F TOBACCO NON-USER: CPT | Performed by: NURSE PRACTITIONER

## 2019-02-15 RX ORDER — METHYLPREDNISOLONE 4 MG/1
TABLET ORAL
Qty: 1 KIT | Refills: 0 | Status: SHIPPED | OUTPATIENT
Start: 2019-02-15 | End: 2019-02-21

## 2019-02-15 RX ORDER — CLARITHROMYCIN 500 MG/1
500 TABLET, COATED ORAL 2 TIMES DAILY
Qty: 20 TABLET | Refills: 0 | Status: SHIPPED | OUTPATIENT
Start: 2019-02-15 | End: 2019-05-10 | Stop reason: SDUPTHER

## 2019-02-15 ASSESSMENT — PATIENT HEALTH QUESTIONNAIRE - PHQ9
SUM OF ALL RESPONSES TO PHQ9 QUESTIONS 1 & 2: 0
SUM OF ALL RESPONSES TO PHQ QUESTIONS 1-9: 0
SUM OF ALL RESPONSES TO PHQ QUESTIONS 1-9: 0
1. LITTLE INTEREST OR PLEASURE IN DOING THINGS: 0
2. FEELING DOWN, DEPRESSED OR HOPELESS: 0

## 2019-02-24 ASSESSMENT — ENCOUNTER SYMPTOMS
COUGH: 1
VOMITING: 0
SHORTNESS OF BREATH: 0
SWOLLEN GLANDS: 0
HOARSE VOICE: 0
SINUS PAIN: 0
SORE THROAT: 0
WHEEZING: 1
NAUSEA: 0
SINUS PRESSURE: 1

## 2019-05-10 ENCOUNTER — OFFICE VISIT (OUTPATIENT)
Dept: FAMILY MEDICINE CLINIC | Age: 69
End: 2019-05-10
Payer: MEDICARE

## 2019-05-10 VITALS
RESPIRATION RATE: 16 BRPM | HEART RATE: 86 BPM | TEMPERATURE: 98.2 F | SYSTOLIC BLOOD PRESSURE: 126 MMHG | OXYGEN SATURATION: 98 % | WEIGHT: 170.6 LBS | HEIGHT: 63 IN | BODY MASS INDEX: 30.23 KG/M2 | DIASTOLIC BLOOD PRESSURE: 78 MMHG

## 2019-05-10 DIAGNOSIS — J01.90 ACUTE BACTERIAL SINUSITIS: Primary | ICD-10-CM

## 2019-05-10 DIAGNOSIS — B96.89 ACUTE BACTERIAL SINUSITIS: Primary | ICD-10-CM

## 2019-05-10 PROCEDURE — 1123F ACP DISCUSS/DSCN MKR DOCD: CPT | Performed by: PHYSICIAN ASSISTANT

## 2019-05-10 PROCEDURE — 1036F TOBACCO NON-USER: CPT | Performed by: PHYSICIAN ASSISTANT

## 2019-05-10 PROCEDURE — G8417 CALC BMI ABV UP PARAM F/U: HCPCS | Performed by: PHYSICIAN ASSISTANT

## 2019-05-10 PROCEDURE — G8427 DOCREV CUR MEDS BY ELIG CLIN: HCPCS | Performed by: PHYSICIAN ASSISTANT

## 2019-05-10 PROCEDURE — G8399 PT W/DXA RESULTS DOCUMENT: HCPCS | Performed by: PHYSICIAN ASSISTANT

## 2019-05-10 PROCEDURE — 3288F FALL RISK ASSESSMENT DOCD: CPT | Performed by: PHYSICIAN ASSISTANT

## 2019-05-10 PROCEDURE — 4040F PNEUMOC VAC/ADMIN/RCVD: CPT | Performed by: PHYSICIAN ASSISTANT

## 2019-05-10 PROCEDURE — 99213 OFFICE O/P EST LOW 20 MIN: CPT | Performed by: PHYSICIAN ASSISTANT

## 2019-05-10 PROCEDURE — 3017F COLORECTAL CA SCREEN DOC REV: CPT | Performed by: PHYSICIAN ASSISTANT

## 2019-05-10 PROCEDURE — 1090F PRES/ABSN URINE INCON ASSESS: CPT | Performed by: PHYSICIAN ASSISTANT

## 2019-05-10 RX ORDER — FLUTICASONE PROPIONATE 50 MCG
1 SPRAY, SUSPENSION (ML) NASAL DAILY
Qty: 1 BOTTLE | Refills: 1 | Status: SHIPPED | OUTPATIENT
Start: 2019-05-10 | End: 2021-02-19 | Stop reason: ALTCHOICE

## 2019-05-10 RX ORDER — PHENTERMINE HYDROCHLORIDE 37.5 MG/1
TABLET ORAL
Refills: 0 | COMMUNITY
Start: 2019-04-19 | End: 2020-02-06

## 2019-05-10 RX ORDER — CLARITHROMYCIN 500 MG/1
500 TABLET, COATED ORAL 2 TIMES DAILY
Qty: 20 TABLET | Refills: 0 | Status: SHIPPED | OUTPATIENT
Start: 2019-05-10 | End: 2021-02-19 | Stop reason: SDUPTHER

## 2019-05-10 ASSESSMENT — ENCOUNTER SYMPTOMS
SORE THROAT: 0
WHEEZING: 0
DIARRHEA: 0
COUGH: 1
EYE REDNESS: 0
BACK PAIN: 0
VOMITING: 0
CONSTIPATION: 0
SHORTNESS OF BREATH: 0
NAUSEA: 0

## 2019-05-10 NOTE — PROGRESS NOTES
Subjective  Joanne Shaw, 71 y.o. female presents today with:  Chief Complaint   Patient presents with    Congestion     Patient c/o cough aond congestion x2 weeks. Patient states she thought it was just her allergies but she is using her medications and thinks arent getting better. Patient states she chokes on how thick the mucus coming up is.  Health Maintenance     Would like to handle at follow up with Jennifer HORTON   Pt is here with c.o green nasal discharge and post nasal discharge for a week   sorethroat  Review of Systems   HENT: Positive for congestion. Negative for sore throat. Eyes: Negative for redness. Respiratory: Positive for cough. Negative for shortness of breath and wheezing. Gastrointestinal: Negative for constipation, diarrhea, nausea and vomiting. Genitourinary: Negative for dysuria. Musculoskeletal: Negative for back pain and neck pain. Skin: Negative for rash. Allergic/Immunologic: Negative for environmental allergies and food allergies. Neurological: Negative for dizziness and headaches. Psychiatric/Behavioral: Negative for sleep disturbance. History reviewed. No pertinent past medical history. No past surgical history on file. Social History     Socioeconomic History    Marital status:      Spouse name: Not on file    Number of children: Not on file    Years of education: Not on file    Highest education level: Not on file   Occupational History    Not on file   Social Needs    Financial resource strain: Not on file    Food insecurity:     Worry: Not on file     Inability: Not on file    Transportation needs:     Medical: Not on file     Non-medical: Not on file   Tobacco Use    Smoking status: Never Smoker    Smokeless tobacco: Never Used   Substance and Sexual Activity    Alcohol use:  Yes    Drug use: No    Sexual activity: Never   Lifestyle    Physical activity:     Days per week: Not on file     Minutes per session: Not on file Normocephalic and atraumatic. Right Ear: A middle ear effusion is present. Left Ear: A middle ear effusion is present. Nose: Mucosal edema present. No sinus tenderness. Mouth/Throat: Oropharynx is clear and moist.   Eyes: Pupils are equal, round, and reactive to light. Conjunctivae and EOM are normal.   Neck: Normal range of motion. Neck supple. Cardiovascular: Normal rate, regular rhythm and normal heart sounds. Pulmonary/Chest: Effort normal and breath sounds normal.   Neurological: She is alert. Skin: Skin is warm and dry. Assessment & Plan    Diagnosis Orders   1. Acute bacterial sinusitis  clarithromycin (BIAXIN) 500 MG tablet         No orders of the defined types were placed in this encounter. Orders Placed This Encounter   Medications    clarithromycin (BIAXIN) 500 MG tablet     Sig: Take 1 tablet by mouth 2 times daily for 10 days     Dispense:  20 tablet     Refill:  0    fluticasone (FLONASE) 50 MCG/ACT nasal spray     Si spray by Nasal route daily     Dispense:  1 Bottle     Refill:  1     Medications Discontinued During This Encounter   Medication Reason    clarithromycin (BIAXIN) 500 MG tablet REORDER    fluticasone (FLONASE) 50 MCG/ACT nasal spray REORDER     Return for follow up with your PCP as directed.     Emani Evans PA-C

## 2019-07-01 ENCOUNTER — TELEPHONE (OUTPATIENT)
Dept: FAMILY MEDICINE CLINIC | Age: 69
End: 2019-07-01

## 2019-07-19 ENCOUNTER — HOSPITAL ENCOUNTER (OUTPATIENT)
Dept: ORTHOPEDIC SURGERY | Age: 69
Discharge: HOME | End: 2019-07-21
Payer: MEDICARE

## 2019-07-19 DIAGNOSIS — M25.569 ARTHRALGIA OF LOWER LEG, UNSPECIFIED LATERALITY: ICD-10-CM

## 2019-07-19 PROCEDURE — 73564 X-RAY EXAM KNEE 4 OR MORE: CPT

## 2019-07-31 ENCOUNTER — OFFICE VISIT (OUTPATIENT)
Dept: FAMILY MEDICINE CLINIC | Age: 69
End: 2019-07-31
Payer: MEDICARE

## 2019-07-31 VITALS
WEIGHT: 162 LBS | SYSTOLIC BLOOD PRESSURE: 138 MMHG | BODY MASS INDEX: 28.7 KG/M2 | OXYGEN SATURATION: 98 % | HEIGHT: 63 IN | DIASTOLIC BLOOD PRESSURE: 80 MMHG | HEART RATE: 75 BPM | TEMPERATURE: 98.1 F

## 2019-07-31 DIAGNOSIS — T63.441A ALLERGIC REACTION TO BEE STING: Primary | ICD-10-CM

## 2019-07-31 DIAGNOSIS — M79.89 SWELLING OF LEFT FOOT: ICD-10-CM

## 2019-07-31 DIAGNOSIS — R22.31 LOCALIZED SWELLING ON RIGHT HAND: ICD-10-CM

## 2019-07-31 PROCEDURE — 1090F PRES/ABSN URINE INCON ASSESS: CPT | Performed by: PHYSICIAN ASSISTANT

## 2019-07-31 PROCEDURE — 3017F COLORECTAL CA SCREEN DOC REV: CPT | Performed by: PHYSICIAN ASSISTANT

## 2019-07-31 PROCEDURE — 4040F PNEUMOC VAC/ADMIN/RCVD: CPT | Performed by: PHYSICIAN ASSISTANT

## 2019-07-31 PROCEDURE — 99213 OFFICE O/P EST LOW 20 MIN: CPT | Performed by: PHYSICIAN ASSISTANT

## 2019-07-31 PROCEDURE — 1036F TOBACCO NON-USER: CPT | Performed by: PHYSICIAN ASSISTANT

## 2019-07-31 PROCEDURE — G8417 CALC BMI ABV UP PARAM F/U: HCPCS | Performed by: PHYSICIAN ASSISTANT

## 2019-07-31 PROCEDURE — 1123F ACP DISCUSS/DSCN MKR DOCD: CPT | Performed by: PHYSICIAN ASSISTANT

## 2019-07-31 PROCEDURE — G8399 PT W/DXA RESULTS DOCUMENT: HCPCS | Performed by: PHYSICIAN ASSISTANT

## 2019-07-31 PROCEDURE — G8427 DOCREV CUR MEDS BY ELIG CLIN: HCPCS | Performed by: PHYSICIAN ASSISTANT

## 2019-07-31 RX ORDER — PHENTERMINE AND TOPIRAMATE 3.75; 23 MG/1; MG/1
CAPSULE, EXTENDED RELEASE ORAL
Refills: 0 | COMMUNITY
Start: 2019-07-08 | End: 2020-03-16

## 2019-07-31 RX ORDER — NABUMETONE 500 MG/1
TABLET, FILM COATED ORAL
Refills: 3 | COMMUNITY
Start: 2019-07-26 | End: 2021-02-19 | Stop reason: ALTCHOICE

## 2019-07-31 RX ORDER — TRIAMCINOLONE ACETONIDE 40 MG/ML
40 INJECTION, SUSPENSION INTRA-ARTICULAR; INTRAMUSCULAR ONCE
Status: COMPLETED | OUTPATIENT
Start: 2019-07-31 | End: 2019-07-31

## 2019-07-31 RX ORDER — METHYLPREDNISOLONE 4 MG/1
TABLET ORAL
Qty: 1 KIT | Refills: 0 | Status: SHIPPED | OUTPATIENT
Start: 2019-07-31 | End: 2019-08-06

## 2019-07-31 RX ADMIN — TRIAMCINOLONE ACETONIDE 40 MG: 40 INJECTION, SUSPENSION INTRA-ARTICULAR; INTRAMUSCULAR at 09:47

## 2019-07-31 ASSESSMENT — ENCOUNTER SYMPTOMS
ABDOMINAL PAIN: 0
EYE PAIN: 0
CHOKING: 0
COLOR CHANGE: 0
APNEA: 0
BACK PAIN: 0
ANAL BLEEDING: 0
BLOOD IN STOOL: 0
EYE ITCHING: 0
CHEST TIGHTNESS: 0
ABDOMINAL DISTENTION: 0
EYE DISCHARGE: 0

## 2019-08-19 ENCOUNTER — OFFICE VISIT (OUTPATIENT)
Dept: FAMILY MEDICINE CLINIC | Age: 69
End: 2019-08-19
Payer: MEDICARE

## 2019-08-19 VITALS
RESPIRATION RATE: 15 BRPM | BODY MASS INDEX: 28.35 KG/M2 | TEMPERATURE: 97.5 F | WEIGHT: 160 LBS | OXYGEN SATURATION: 98 % | HEIGHT: 63 IN | SYSTOLIC BLOOD PRESSURE: 128 MMHG | DIASTOLIC BLOOD PRESSURE: 82 MMHG | HEART RATE: 75 BPM

## 2019-08-19 DIAGNOSIS — L23.7 ALLERGIC CONTACT DERMATITIS DUE TO PLANTS, EXCEPT FOOD: ICD-10-CM

## 2019-08-19 PROCEDURE — 3017F COLORECTAL CA SCREEN DOC REV: CPT | Performed by: NURSE PRACTITIONER

## 2019-08-19 PROCEDURE — 99213 OFFICE O/P EST LOW 20 MIN: CPT | Performed by: NURSE PRACTITIONER

## 2019-08-19 PROCEDURE — G8417 CALC BMI ABV UP PARAM F/U: HCPCS | Performed by: NURSE PRACTITIONER

## 2019-08-19 PROCEDURE — 1090F PRES/ABSN URINE INCON ASSESS: CPT | Performed by: NURSE PRACTITIONER

## 2019-08-19 PROCEDURE — 1123F ACP DISCUSS/DSCN MKR DOCD: CPT | Performed by: NURSE PRACTITIONER

## 2019-08-19 PROCEDURE — 4040F PNEUMOC VAC/ADMIN/RCVD: CPT | Performed by: NURSE PRACTITIONER

## 2019-08-19 PROCEDURE — G8399 PT W/DXA RESULTS DOCUMENT: HCPCS | Performed by: NURSE PRACTITIONER

## 2019-08-19 PROCEDURE — G8427 DOCREV CUR MEDS BY ELIG CLIN: HCPCS | Performed by: NURSE PRACTITIONER

## 2019-08-19 PROCEDURE — 96372 THER/PROPH/DIAG INJ SC/IM: CPT | Performed by: NURSE PRACTITIONER

## 2019-08-19 PROCEDURE — 1036F TOBACCO NON-USER: CPT | Performed by: NURSE PRACTITIONER

## 2019-08-19 RX ORDER — METHYLPREDNISOLONE 4 MG/1
TABLET ORAL
Qty: 1 KIT | Refills: 0 | Status: SHIPPED | OUTPATIENT
Start: 2019-08-19 | End: 2019-08-25

## 2019-08-19 RX ORDER — METHYLPREDNISOLONE ACETATE 40 MG/ML
40 INJECTION, SUSPENSION INTRA-ARTICULAR; INTRALESIONAL; INTRAMUSCULAR; SOFT TISSUE ONCE
Status: COMPLETED | OUTPATIENT
Start: 2019-08-19 | End: 2019-08-19

## 2019-08-19 RX ADMIN — METHYLPREDNISOLONE ACETATE 40 MG: 40 INJECTION, SUSPENSION INTRA-ARTICULAR; INTRALESIONAL; INTRAMUSCULAR; SOFT TISSUE at 16:02

## 2019-08-19 ASSESSMENT — ENCOUNTER SYMPTOMS
COLOR CHANGE: 0
BACK PAIN: 0
SHORTNESS OF BREATH: 0
RHINORRHEA: 0
SORE THROAT: 0
COUGH: 0
ABDOMINAL PAIN: 0
EYE PAIN: 0
PHOTOPHOBIA: 0
WHEEZING: 0
DIARRHEA: 0
NAUSEA: 0
VOMITING: 0
EYE REDNESS: 0

## 2019-08-20 RX ORDER — CITALOPRAM 10 MG/1
10 TABLET ORAL DAILY
Qty: 90 TABLET | Refills: 0 | Status: SHIPPED | OUTPATIENT
Start: 2019-08-20 | End: 2020-02-06

## 2020-02-06 ENCOUNTER — OFFICE VISIT (OUTPATIENT)
Dept: FAMILY MEDICINE CLINIC | Age: 70
End: 2020-02-06
Payer: MEDICARE

## 2020-02-06 VITALS
OXYGEN SATURATION: 99 % | WEIGHT: 154 LBS | HEIGHT: 63 IN | TEMPERATURE: 98.1 F | SYSTOLIC BLOOD PRESSURE: 120 MMHG | HEART RATE: 79 BPM | BODY MASS INDEX: 27.29 KG/M2 | DIASTOLIC BLOOD PRESSURE: 80 MMHG

## 2020-02-06 PROCEDURE — 1090F PRES/ABSN URINE INCON ASSESS: CPT | Performed by: FAMILY MEDICINE

## 2020-02-06 PROCEDURE — 99213 OFFICE O/P EST LOW 20 MIN: CPT | Performed by: FAMILY MEDICINE

## 2020-02-06 PROCEDURE — G8417 CALC BMI ABV UP PARAM F/U: HCPCS | Performed by: FAMILY MEDICINE

## 2020-02-06 PROCEDURE — 1123F ACP DISCUSS/DSCN MKR DOCD: CPT | Performed by: FAMILY MEDICINE

## 2020-02-06 PROCEDURE — G8427 DOCREV CUR MEDS BY ELIG CLIN: HCPCS | Performed by: FAMILY MEDICINE

## 2020-02-06 PROCEDURE — 3017F COLORECTAL CA SCREEN DOC REV: CPT | Performed by: FAMILY MEDICINE

## 2020-02-06 PROCEDURE — G8484 FLU IMMUNIZE NO ADMIN: HCPCS | Performed by: FAMILY MEDICINE

## 2020-02-06 PROCEDURE — G8399 PT W/DXA RESULTS DOCUMENT: HCPCS | Performed by: FAMILY MEDICINE

## 2020-02-06 PROCEDURE — 4040F PNEUMOC VAC/ADMIN/RCVD: CPT | Performed by: FAMILY MEDICINE

## 2020-02-06 PROCEDURE — 1036F TOBACCO NON-USER: CPT | Performed by: FAMILY MEDICINE

## 2020-02-06 RX ORDER — IPRATROPIUM BROMIDE 21 UG/1
2 SPRAY, METERED NASAL 3 TIMES DAILY PRN
Qty: 1 BOTTLE | Refills: 3 | Status: SHIPPED | OUTPATIENT
Start: 2020-02-06 | End: 2021-02-19 | Stop reason: ALTCHOICE

## 2020-02-06 RX ORDER — DOXYCYCLINE HYCLATE 100 MG
100 TABLET ORAL 2 TIMES DAILY
Qty: 20 TABLET | Refills: 0 | Status: SHIPPED | OUTPATIENT
Start: 2020-02-06 | End: 2020-02-16

## 2020-02-06 RX ORDER — BENZONATATE 100 MG/1
100-200 CAPSULE ORAL 3 TIMES DAILY PRN
Qty: 60 CAPSULE | Refills: 0 | Status: SHIPPED | OUTPATIENT
Start: 2020-02-06 | End: 2020-02-13

## 2020-02-06 NOTE — PROGRESS NOTES
Chief Complaint   Patient presents with    Sinus Problem     pt states she has been using benydryl on & off for 2 weeks     Otalgia     Rt ear pain         HPI: Yvonne Reveal 71 y.o. female presenting for    Patient is coming with a chief complaint of sinus pain and otalgia. Has been going on for last 2 weeks. Patient admits to some nasal congestion as well as a nonproductive cough. Denies any fevers, chills, nausea, vomiting, chest pain, shortness of breath, abdominal pain, change urination, or change stools. Has been using Flonase and home remedies for symptoms. Current Outpatient Medications   Medication Sig Dispense Refill    doxycycline hyclate (VIBRA-TABS) 100 MG tablet Take 1 tablet by mouth 2 times daily for 10 days 20 tablet 0    ipratropium (ATROVENT) 0.03 % nasal spray 2 sprays by Nasal route 3 times daily as needed for Rhinitis 1 Bottle 3    benzonatate (TESSALON) 100 MG capsule Take 1-2 capsules by mouth 3 times daily as needed for Cough 60 capsule 0    nabumetone (RELAFEN) 500 MG tablet TK 1 T PO QD  3    QSYMIA 3.75-23 MG CP24 TK ONE C PO QD  0    hydrocortisone 2.5 % cream Apply topically 2 times daily. 1 Tube 1    fluticasone (FLONASE) 50 MCG/ACT nasal spray 1 spray by Nasal route daily 1 Bottle 1    albuterol sulfate HFA (PROAIR HFA) 108 (90 Base) MCG/ACT inhaler Inhale 2 puffs into the lungs every 6 hours as needed for Wheezing 1 Inhaler 0     Current Facility-Administered Medications   Medication Dose Route Frequency Provider Last Rate Last Dose    lidocaine-EPINEPHrine 2 percent-1:911040 injection 3 mL  3 mL Intradermal Once Jennifer Salazar, APRN - CNP            ROS  CONSTITUTIONAL: The patient denies fevers, chills, sweats and body ache. HEENT: Denies headache, blurry vision, eye pain, tinnitus, vertigo,  sore throat, neck or thyroid masses. RESPIRATORY: Denies cough, sputum, hemoptysis.   CARDIAC: Denies chest pain, pressure, palpitations, Denies lower extremity edema.  GASTROINTESTINAL: Denies abdominal pain, constipation, diarrhea, bleeding in the stools,   GENITOURINARY: Denies dysuria, hematuria, nocturia or frequency, urinary incontinence. NEUROLOGIC: Denies headaches, dizziness, syncope, weakness  MUSCULOSKELETAL: denies changes in range of motion, joint pain, stiffness. ENDOCRINOLOGY: Denies heat or cold intolerance. HEMATOLOGY: Denies easy bleeding or blood transfusion,anemia  DERMATOLOGY: Denies changes in moles or pigmentation changes. PSYCHIATRY: Denies depression, agitation or anxiety. No past medical history on file. No past surgical history on file. No family history on file. Social History     Socioeconomic History    Marital status:      Spouse name: Not on file    Number of children: Not on file    Years of education: Not on file    Highest education level: Not on file   Occupational History    Not on file   Social Needs    Financial resource strain: Not on file    Food insecurity:     Worry: Not on file     Inability: Not on file    Transportation needs:     Medical: Not on file     Non-medical: Not on file   Tobacco Use    Smoking status: Never Smoker    Smokeless tobacco: Never Used   Substance and Sexual Activity    Alcohol use:  Yes    Drug use: No    Sexual activity: Never   Lifestyle    Physical activity:     Days per week: Not on file     Minutes per session: Not on file    Stress: Not on file   Relationships    Social connections:     Talks on phone: Not on file     Gets together: Not on file     Attends Worship service: Not on file     Active member of club or organization: Not on file     Attends meetings of clubs or organizations: Not on file     Relationship status: Not on file    Intimate partner violence:     Fear of current or ex partner: Not on file     Emotionally abused: Not on file     Physically abused: Not on file     Forced sexual activity: Not on file   Other Topics Concern    Not on file Social History Narrative    Not on file        /80 (Site: Left Upper Arm, Position: Sitting, Cuff Size: Medium Adult)   Pulse 79   Temp 98.1 °F (36.7 °C) (Oral)   Ht 5' 3\" (1.6 m)   Wt 154 lb (69.9 kg)   SpO2 99%   BMI 27.28 kg/m²        Physical Exam:    General appearance - alert, well appearing, and in no distress  Mental Status - alert, oriented to person, place, and time  Eyes - pupils equal and reactive, extraocular eye movements intact   Ears -cerumen impaction in the right ear. Nose - normal and patent, no erythema, discharge or polyps   Sinuses -tenderness to palpation in maxillary sinus. Throat - mucous membranes moist, pharynx normal without lesions   Neck - supple, no significant adenopathy   Thyroid - thyroid is normal in size without nodules or tenderness    Chest - clear to auscultation, no wheezes, rales or rhonchi, symmetric air entry   Heart - normal rate, regular rhythm, normal S1, S2, no murmurs, rubs, clicks or gallops  Abdomen - soft, nontender, nondistended, no masses or organomegaly   Back exam - full range of motion, no tenderness, palpable spasm or pain on motion   Neurological - alert, oriented, normal speech, no focal findings or movement disorder noted   Musculoskeletal - no joint tenderness, deformity or swelling   Extremities - peripheral pulses normal, no pedal edema, no clubbing or cyanosis   Skin - normal coloration and turgor, no rashes, no suspicious skin lesions noted      Labs   No results found for: TSHREFLEX  TSH   Date Value Ref Range Status   03/31/2013 2.744 0.550 - 4.780 uIU/mL Final       A/P: Mimi Ji 71 y.o. female presenting for     1. Sinusitis, unspecified chronicity, unspecified location  - doxycycline hyclate (VIBRA-TABS) 100 MG tablet; Take 1 tablet by mouth 2 times daily for 10 days  Dispense: 20 tablet;  Refill: 0  - ipratropium (ATROVENT) 0.03 % nasal spray; 2 sprays by Nasal route 3 times daily as needed for Rhinitis  Dispense: 1 Bottle; Refill: 3  - benzonatate (TESSALON) 100 MG capsule; Take 1-2 capsules by mouth 3 times daily as needed for Cough  Dispense: 60 capsule; Refill: 0          Please note, this report has been partially produced using speech recognition software  and may cause  and /or contain errors related to that system including grammar, punctuation and spelling as well as words and phrases that may seem inappropriate. If there are questions or concerns please feel free to contact me to clarify.

## 2020-02-06 NOTE — LETTER
[facility]  [address] [address]  Phone: [phone]  Fax: [phone]    Aime Teague MD        February 6, 2020     Patient: Inell Cam   YOB: 1950   Date of Visit: 2/6/2020       To Whom it May Concern:    Adelina Mcginnis was seen in my clinic on 2/6/2020. Please excuse from work from 2/7/2020. She may return to work on 2/10/2020. If you have any questions or concerns, please don't hesitate to call.     Sincerely,           Aime Teague MD

## 2020-03-16 ENCOUNTER — OFFICE VISIT (OUTPATIENT)
Dept: FAMILY MEDICINE CLINIC | Age: 70
End: 2020-03-16
Payer: MEDICARE

## 2020-03-16 VITALS
BODY MASS INDEX: 28 KG/M2 | DIASTOLIC BLOOD PRESSURE: 90 MMHG | OXYGEN SATURATION: 99 % | SYSTOLIC BLOOD PRESSURE: 140 MMHG | WEIGHT: 158 LBS | HEIGHT: 63 IN | TEMPERATURE: 98.1 F | HEART RATE: 89 BPM

## 2020-03-16 PROCEDURE — G8417 CALC BMI ABV UP PARAM F/U: HCPCS | Performed by: FAMILY MEDICINE

## 2020-03-16 PROCEDURE — 1036F TOBACCO NON-USER: CPT | Performed by: FAMILY MEDICINE

## 2020-03-16 PROCEDURE — 3017F COLORECTAL CA SCREEN DOC REV: CPT | Performed by: FAMILY MEDICINE

## 2020-03-16 PROCEDURE — 99213 OFFICE O/P EST LOW 20 MIN: CPT | Performed by: FAMILY MEDICINE

## 2020-03-16 PROCEDURE — G8399 PT W/DXA RESULTS DOCUMENT: HCPCS | Performed by: FAMILY MEDICINE

## 2020-03-16 PROCEDURE — G8427 DOCREV CUR MEDS BY ELIG CLIN: HCPCS | Performed by: FAMILY MEDICINE

## 2020-03-16 PROCEDURE — 1090F PRES/ABSN URINE INCON ASSESS: CPT | Performed by: FAMILY MEDICINE

## 2020-03-16 PROCEDURE — G8484 FLU IMMUNIZE NO ADMIN: HCPCS | Performed by: FAMILY MEDICINE

## 2020-03-16 PROCEDURE — 1123F ACP DISCUSS/DSCN MKR DOCD: CPT | Performed by: FAMILY MEDICINE

## 2020-03-16 PROCEDURE — 4040F PNEUMOC VAC/ADMIN/RCVD: CPT | Performed by: FAMILY MEDICINE

## 2020-03-16 NOTE — PROGRESS NOTES
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 female presenting for    Patient is coming here with concerns of working with the new coronavirus outbreak. Patient reports that she has a history of respiratory disease and has a brother who is mentally disabled. Patient is the primary caregiver. Patient reports that she is also battling a sinus infection and is wondering if she is at higher risk with being around patients in a healthcare setting. Patient denies any fevers, chills, nausea, vomiting, chest pain, shortness of breath, abdominal pain, change in urination, change in stools at this time. Patient reports that she talk to her health care facility and stated that if she needed to have FMLA she could have it under her circumstances. Patient just wanted reassurance that she was doing everything correctly. Current Outpatient Medications   Medication Sig Dispense Refill    ipratropium (ATROVENT) 0.03 % nasal spray 2 sprays by Nasal route 3 times daily as needed for Rhinitis 1 Bottle 3    nabumetone (RELAFEN) 500 MG tablet TK 1 T PO QD  3    hydrocortisone 2.5 % cream Apply topically 2 times daily. 1 Tube 1    fluticasone (FLONASE) 50 MCG/ACT nasal spray 1 spray by Nasal route daily 1 Bottle 1    albuterol sulfate HFA (PROAIR HFA) 108 (90 Base) MCG/ACT inhaler Inhale 2 puffs into the lungs every 6 hours as needed for Wheezing 1 Inhaler 0     Current Facility-Administered Medications   Medication Dose Route Frequency Provider Last Rate Last Dose    lidocaine-EPINEPHrine 2 percent-1:[id number] injection 3 mL  3 mL Intradermal Once NINO Garcia - CNP            ROS  CONSTITUTIONAL: The patient denies fevers, chills, sweats and body ache. HEENT: Denies headache, blurry vision, eye pain, tinnitus, vertigo,  sore throat, neck or thyroid masses. Recovering from a sinus infection. RESPIRATORY: Denies cough, sputum, hemoptysis. CARDIAC: Denies chest pain, pressure, palpitations, Denies lower extremity edema. GASTROINTESTINAL: Denies abdominal pain, constipation, diarrhea, bleeding in the stools,   GENITOURINARY: Denies dysuria, hematuria, nocturia or frequency, urinary incontinence. NEUROLOGIC: Denies headaches, dizziness, syncope, weakness  MUSCULOSKELETAL: denies changes in range of motion, joint pain, stiffness. ENDOCRINOLOGY: Denies heat or cold intolerance. HEMATOLOGY: Denies easy bleeding or blood transfusion,anemia  DERMATOLOGY: Denies changes in moles or pigmentation changes. PSYCHIATRY: Denies depression, agitation or anxiety. Past Medical History:   Diagnosis Date    Sinus infection         No past surgical history on file. No family history on file. Social History     Socioeconomic History    Marital status:      Spouse name: Not on file    Number of children: Not on file    Years of education: Not on file    Highest education level: Not on file   Occupational History    Not on file   Social Needs    Financial resource strain: Not on file    Food insecurity     Worry: Not on file     Inability: Not on file    Transportation needs     Medical: Not on file     Non-medical: Not on file   Tobacco Use    Smoking status: Never Smoker    Smokeless tobacco: Never Used   Substance and Sexual Activity    Alcohol use:  Yes    Drug use: No    Sexual activity: Never   Lifestyle    Physical activity     Days per week: Not on file     Minutes per session: Not on file    Stress: Not on file   Relationships    Social connections     Talks on phone: Not on file     Gets together: Not on file     Attends Holiness service: Not on file     Active member of club or organization: Not on file     Attends meetings of clubs or organizations: Not on file     Relationship status: Not on file    Intimate partner violence     Fear of current or ex partner: Not on file Emotionally abused: Not on file     Physically abused: Not on file     Forced sexual activity: Not on file   Other Topics Concern    Not on file   Social History Narrative    Not on file        BP (!) 140/90 (Site: Left Upper Arm, Position: Sitting, Cuff Size: Medium Adult)   Pulse 89   Temp 98.1 °F (36.7 °C) (Oral)   Ht 5' 3\" (1.6 m)   Wt 158 lb (71.7 kg)   SpO2 99%   BMI 27.99 kg/m²        Physical Exam:    General appearance - alert, well appearing, and in no distress  Mental Status - alert, oriented to person, place, and time  Eyes - pupils equal and reactive, extraocular eye movements intact   Ears - bilateral TM's and external ear canals normal   Nose - normal and patent, no erythema, discharge or polyps   Sinuses - Normal paranasal sinuses without tenderness   Throat - mucous membranes moist, pharynx normal without lesions   Neck - supple, no significant adenopathy   Thyroid - thyroid is normal in size without nodules or tenderness    Chest - clear to auscultation, no wheezes, rales or rhonchi, symmetric air entry   Heart - normal rate, regular rhythm, normal S1, S2, no murmurs, rubs, clicks or gallops  Abdomen - soft, nontender, nondistended, no masses or organomegaly   Back exam - full range of motion, no tenderness, palpable spasm or pain on motion   Neurological - alert, oriented, normal speech, no focal findings or movement disorder noted   Musculoskeletal - no joint tenderness, deformity or swelling   Extremities - peripheral pulses normal, no pedal edema, no clubbing or cyanosis   Skin - normal coloration and turgor, no rashes, no suspicious skin lesions noted      Labs   No results found for: TSHREFLEX  TSH   Date Value Ref Range Status   03/31/2013 2.744 0.550 - 4.780 uIU/mL Final     Lab Results   Component Value Date     02/07/2017    K 3.9 02/07/2017     02/07/2017    CO2 21 (L) 02/07/2017    BUN 17 02/07/2017    CREATININE 0.55 02/07/2017    GLUCOSE 85 02/07/2017 CALCIUM 9.5 02/07/2017    PROT 6.8 02/07/2017    LABALBU 4.5 02/07/2017    BILITOT 0.4 02/07/2017    ALKPHOS 80 02/07/2017    AST 19 02/07/2017    ALT 16 02/07/2017    LABGLOM >60.0 02/07/2017    GFRAA >60.0 02/07/2017    GLOB 2.3 02/07/2017     Lab Results   Component Value Date    WBC 5.0 03/31/2013    HGB 13.8 03/31/2013    HCT 42.1 03/31/2013    MCV 94.8 03/31/2013     03/31/2013       A/P: Inell Cam 79 y.o. female presenting for     1. Sinusitis, unspecified chronicity, unspecified location  Continue treatment from last visit. Talked to patient extensively about the COVID-19. Gave patient more information about the virus           Please note, this report has been partially produced using speech recognition software  and may cause  and /or contain errors related to that system including grammar, punctuation and spelling as well as words and phrases that may seem inappropriate. If there are questions or concerns please feel free to contact me to clarify.

## 2020-06-19 ENCOUNTER — TELEPHONE (OUTPATIENT)
Dept: FAMILY MEDICINE CLINIC | Age: 70
End: 2020-06-19

## 2020-06-19 NOTE — TELEPHONE ENCOUNTER
Left message for patient to call [facility] to schedule AWV.   Tresa Garvin, CNP

## 2020-10-01 ENCOUNTER — TELEPHONE (OUTPATIENT)
Dept: FAMILY MEDICINE CLINIC | Age: 70
End: 2020-10-01

## 2020-10-12 NOTE — PROGRESS NOTES
[address] [address]  Outpatient Physical Therapy    Treatment Note        Date: 2018  Patient: Kassie Vogel  : 1950  ACCT #: [de-identified]  Referring Practitioner: [place]  Diagnosis: R Shldr Bursistis ( s/p injection 18)    Visit Information:  PT Visit Information  Onset Date: 18  PT Insurance Information: [facility] Medicare; 25.00 co-pay  Total # of Visits to Date: 4  Plan of Care/Certification Expiration Date: 18  No Show: 0  Canceled Appointment: 1  Progress Note Counter: 4/10    Subjective: no real pain today, just some soreness  Comments: Pt works as  assistant at [employer] Compliance:  [x] Good []  Hotel Tablet Themes [] Poor [] Reports not doing due to:    Vital Signs  Patient Currently in Pain: Denies   Pain Screening  Patient Currently in Pain: Denies    OBJECTIVE:   Exercises  Exercise 1: pulleys 4 minutes  Exercise 2:  Wand exs, flex, ext, ER, IR with strap, 5 sec x 5,   Exercise 3:  Table slides Flex, scaption 10s x 10 ea  Exercise 4: scapular retraction 5s x 15  Exercise 5: corner stretch 20 sec x 3  Exercise 7: UT stretch, 20 sec x 3, b/l  Exercise 8: lev scap stretch b/l 3x20''  Exercise 9: post cap stretch 20 sec x 3, right  Exercise 10: look away stretch 20 sec x 3, right       Strength: [x] NT  [] MMT completed:     ROM: [x] NT  [] ROM measurements:        Modalities:  Modalities  Cryotherapy (Minutes\Location): 10 mins to R shldr during E-stim  E-stim (parameters): IFC R UT to med deltoid  and infraspinatus to ant deltoid x 10 mins in sitting     *Indicates exercise, modality, or manual techniques to be initiated when appropriate    Assessment: Body structures, Functions, Activity limitations: Decreased functional mobility , Decreased ADL status, Decreased ROM, Decreased strength, Decreased high-level IADLs  Assessment: added multiple stretches, ex, vc's to not overstretch, good jose overall, conclude with e-stim/cp, 10 min  Treatment Diagnosis: R Shldr Pain;  Impaired Strength; Impaired ROM  Prognosis: Good       Goals:  Short term goals  Time Frame for Short term goals: 4 visits   Short term goal 1: I w/ initial HEP    Long term goals  Time Frame for Long term goals : 8-10 visits  Long term goal 1: Dec R shldr pain to </= 4/10 at worse  Long term goal 2: Inc R GHJ Flex and ABD to >/= 140 and ER to >/= 70 deg  to improve overhead and job duties  Long term goal 3: inc strength R GHJ to >/= 4+/5 flex, Abd, ER to improve household duties  Long term goal 4: Inc strength B rhmbd,and MT to >/= 4/5 to dec pain w. overhead activites  Long term goal 5: Inc UEFI to >/= 70/80  Progress toward goals:ongoing    POST-PAIN       Pain Rating (0-10 pain scale):  2 /10   Location and pain description same as pre-treatment unless indicated. Action: [] NA   [x] Perform HEP  [] Meds as prescribed  [] Modalities as prescribed   [] Call Physician     Frequency/Duration:  Plan  Times per week: 2-3  Plan weeks: for 8-10 visits  Current Treatment Recommendations: Strengthening, ROM, Neuromuscular Re-education, Manual Therapy - Soft Tissue Mobilization, Manual Therapy - Joint Manipulation, Pain Management, Modalities, Home Exercise Program  Plan Comment: skilled PT     Pt to continue current HEP. See objective section for any therapeutic exercise changes, additions or modifications this date.          PT Individual Minutes  Time In: 3719  Time Out: 4669  Minutes: 48  Timed Code Treatment Minutes: 38 Minutes  Procedure Minutes:10    Signature:  Electronically signed by Jackie Lira PTA on 9/14/18 at 1:36 PM no

## 2021-02-19 ENCOUNTER — OFFICE VISIT (OUTPATIENT)
Dept: FAMILY MEDICINE CLINIC | Age: 71
End: 2021-02-19
Payer: MEDICARE

## 2021-02-19 VITALS
HEIGHT: 63 IN | DIASTOLIC BLOOD PRESSURE: 82 MMHG | BODY MASS INDEX: 28.7 KG/M2 | TEMPERATURE: 97.8 F | SYSTOLIC BLOOD PRESSURE: 138 MMHG | HEART RATE: 80 BPM | RESPIRATION RATE: 12 BRPM | OXYGEN SATURATION: 97 % | WEIGHT: 162 LBS

## 2021-02-19 DIAGNOSIS — H61.21 IMPACTED CERUMEN OF RIGHT EAR: Primary | ICD-10-CM

## 2021-02-19 DIAGNOSIS — H92.01 OTALGIA OF RIGHT EAR: ICD-10-CM

## 2021-02-19 PROCEDURE — 4040F PNEUMOC VAC/ADMIN/RCVD: CPT | Performed by: NURSE PRACTITIONER

## 2021-02-19 PROCEDURE — 99213 OFFICE O/P EST LOW 20 MIN: CPT | Performed by: NURSE PRACTITIONER

## 2021-02-19 PROCEDURE — 1090F PRES/ABSN URINE INCON ASSESS: CPT | Performed by: NURSE PRACTITIONER

## 2021-02-19 PROCEDURE — G8417 CALC BMI ABV UP PARAM F/U: HCPCS | Performed by: NURSE PRACTITIONER

## 2021-02-19 PROCEDURE — 69209 REMOVE IMPACTED EAR WAX UNI: CPT | Performed by: NURSE PRACTITIONER

## 2021-02-19 PROCEDURE — 1123F ACP DISCUSS/DSCN MKR DOCD: CPT | Performed by: NURSE PRACTITIONER

## 2021-02-19 PROCEDURE — G8484 FLU IMMUNIZE NO ADMIN: HCPCS | Performed by: NURSE PRACTITIONER

## 2021-02-19 PROCEDURE — G8399 PT W/DXA RESULTS DOCUMENT: HCPCS | Performed by: NURSE PRACTITIONER

## 2021-02-19 PROCEDURE — 3017F COLORECTAL CA SCREEN DOC REV: CPT | Performed by: NURSE PRACTITIONER

## 2021-02-19 PROCEDURE — G8427 DOCREV CUR MEDS BY ELIG CLIN: HCPCS | Performed by: NURSE PRACTITIONER

## 2021-02-19 PROCEDURE — 1036F TOBACCO NON-USER: CPT | Performed by: NURSE PRACTITIONER

## 2021-02-19 RX ORDER — MELOXICAM 15 MG/1
15 TABLET ORAL DAILY
COMMUNITY

## 2021-02-19 RX ORDER — CLARITHROMYCIN 500 MG/1
500 TABLET, COATED ORAL 2 TIMES DAILY
Qty: 20 TABLET | Refills: 0 | Status: SHIPPED | OUTPATIENT
Start: 2021-02-19 | End: 2021-03-01

## 2021-02-19 ASSESSMENT — ENCOUNTER SYMPTOMS
NAUSEA: 0
DIARRHEA: 0
SINUS PAIN: 0
SINUS PRESSURE: 0
SORE THROAT: 0
VOMITING: 0
SHORTNESS OF BREATH: 0
COUGH: 0
WHEEZING: 0

## 2021-02-19 ASSESSMENT — PATIENT HEALTH QUESTIONNAIRE - PHQ9
2. FEELING DOWN, DEPRESSED OR HOPELESS: 0
1. LITTLE INTEREST OR PLEASURE IN DOING THINGS: 0

## 2021-02-19 NOTE — PATIENT INSTRUCTIONS
Patient Education        Earwax Blockage: Care Instructions  Your Care Instructions     Earwax is a natural substance that protects the ear canal. Normally, earwax drains from the ears and does not cause problems. Sometimes earwax builds up and hardens. Earwax blockage (also called cerumen impaction) can cause some loss of hearing and pain. When wax is tightly packed, you will need to have your doctor remove it. Follow-up care is a key part of your treatment and safety. Be sure to make and go to all appointments, and call your doctor if you are having problems. It's also a good idea to know your test results and keep a list of the medicines you take. How can you care for yourself at home? · Do not try to remove earwax with cotton swabs, fingers, or other objects. This can make the blockage worse and damage the eardrum. · If your doctor recommends that you try to remove earwax at home:  ? Soften and loosen the earwax with warm mineral oil. You also can try hydrogen peroxide mixed with an equal amount of room temperature water. Place 2 drops of the fluid, warmed to body temperature, in the ear two times a day for up to 5 days. ? Once the wax is loose and soft, all that is usually needed to remove it from the ear canal is a gentle, warm shower. Direct the water into the ear, then tip your head to let the earwax drain out. Dry your ear thoroughly with a hair dryer set on low. Hold the dryer several inches from your ear. ? If the warm mineral oil and shower do not work, use an over-the-counter wax softener. Read and follow all instructions on the label. After using the wax softener, use an ear syringe to gently flush the ear. Make sure the flushing solution is body temperature. Cool or hot fluids in the ear can cause dizziness. When should you call for help?    Call your doctor now or seek immediate medical care if:    · Pus or blood drains from your ear.     · Your ears are ringing or feel full.     · You have a loss of hearing. Watch closely for changes in your health, and be sure to contact your doctor if:    · You have pain or reduced hearing after 1 week of home treatment.     · You have any new symptoms, such as nausea or balance problems. Where can you learn more? Go to https://chpepiceweb.[email or web address]. org and sign in to your [employer] account. Enter X055 in the [facility] box to learn more about \"Earwax Blockage: Care Instructions. \"     If you do not have an account, please click on the \"Sign Up Now\" link. Current as of: June 26, 2019               Content Version: 12.6  © 6949-0220 [employer]. Care instructions adapted under license by [facility] ([facility]). If you have questions about a medical condition or this instruction, always ask your healthcare professional. [address] any warranty or liability for your use of this information. Patient Education        carbamide peroxide (otic)  Pronunciation:  KAYA ba mide per OX mukund [place] tik  Brand:  Auraphene-B, Debrox, Ear Wax, Ear Wax Removal, Mollifene, Murine Ear Drops  What is the most important information I should know about carbamide peroxide? You should not use this medicine if you have a hole in your ear drum (ruptured ear drum), or if you have any signs of ear infection or injury, such as pain, warmth, swelling, drainage, or bleeding. What is carbamide peroxide? Carbamide peroxide otic (for the ears) is used to soften and loosen ear wax, making it easier to remove. Carbamide peroxide may also be used for purposes not listed in this medication guide. What should I discuss with my healthcare provider before using carbamide peroxide? You should not use carbamide peroxide otic if you are allergic to it, or if you have a hole in your ear drum (ruptured ear drum).   Ask a doctor or pharmacist if it is safe for you to use this medicine if you have other medical conditions, especially:  · recent ear surgery or injury;  · ear pain, itching, or other irritation;  · drainage, discharge, or bleeding from the ear; or  · warmth or swelling around the ear. Carbamide peroxide otic should not be used on a child younger than 15years old. How should I use carbamide peroxide? Use exactly as directed on the label, or as prescribed by your doctor. Do not use in larger or smaller amounts or for longer than recommended. Carbamide peroxide otic comes with patient instructions for safe and effective use. Follow these directions carefully. Ask your doctor or pharmacist if you have any questions. Wash your hands before and after using this medicine. To use the ear drops:  · Lie down or tilt your head with your ear facing upward. Open the ear canal by gently pulling your ear back, or pulling downward on the earlobe when giving this medicine to a child. · Hold the dropper upside down over your ear and drop the correct number of drops into the ear. · You may hear a bubbling sound inside your ear. This is caused by the foaming action of carbamide peroxide, which helps break up the wax inside your ear. · Stay lying down or with your head tilted for at least 5 minutes. You may use a small piece of cotton to plug the ear and keep the medicine from draining out. Follow your doctor's instructions about the use of cotton. · Do not touch the dropper tip or place it directly in your ear. It may become contaminated. Wipe the tip with a clean tissue but do not wash with water or soap. Carbamide peroxide may be packaged with a bulb syringe that is used to flush out your ear with water. To use the bulb syringe:  · Fill the syringe with warm water that is body temperature (no warmer than 98 degrees F). Do not use hot or cold water. · Hold your head sideways with your ear over a sink or bowl.  Gently pull your ear back to open the ear canal. Place the tip of the bulb syringe at the opening of your ear canal. Do not insert the tip into your ear.  · Squeeze the bulb syringe gently to release the water into your ear. Do not squirt the water with any force, or you could damage your ear drum. · Remove the syringe and allow the water to drain from your ear into the sink or bowl. Do not use carbamide peroxide for longer than 4 days in a row. Call your doctor if you still have excessive earwax after using this medicine, or if your symptoms get worse. Clean the bulb syringe by filling it with plain water and emptying it several times. Do not use soap or other cleaning chemicals. Allow the syringe to air dry. Keep the medicine bottle tightly closed and store it in the outer carton at room temperature, away from moisture and heat. What happens if I miss a dose? Since carbamide peroxide otic is used when needed, you may not be on a dosing schedule. If you are on a schedule, use the missed dose as soon as you remember. Skip the missed dose if it is almost time for your next scheduled dose. Do not use extra medicine to make up the missed dose. What happens if I overdose? An overdose of carbamide peroxide otic is not expected to be dangerous. Seek emergency medical attention or call the Poison Help line at 1-175.147.2995 if anyone has accidentally swallowed the medication. What should I avoid while using carbamide peroxide? Avoid getting this medicine in your eyes or mouth. Do not use other ear drops unless your doctor has told you to. What are the possible side effects of carbamide peroxide? Get emergency medical help if you have signs of an allergic reaction: hives; difficult breathing; swelling of your face, lips, tongue, or throat. Stop using carbamide peroxide otic and call your doctor at once if you have:  · dizziness; or  · new or worsening ear problems.   Common side effects may include:  · a foaming or crackling sound in the ear after using the ear drops;  · temporary decrease in hearing after using the drops;  · mild feeling of fullness in the ear; or  · mild itching inside the ear. This is not a complete list of side effects and others may occur. Call your doctor for medical advice about side effects. You may report side effects to FDA at 1-437-SVM-5816. What other drugs will affect carbamide peroxide? It is not likely that other drugs you take orally or inject will have an effect on carbamide peroxide used in the ears. But many drugs can interact with each other. Tell each of your healthcare providers about all medicines you use, including prescription and over-the-counter medicines, vitamins, and herbal products. Where can I get more information? Your pharmacist can provide more information about carbamide peroxide. Remember, keep this and all other medicines out of the reach of children, never share your medicines with others, and use this medication only for the indication prescribed. Every effort has been made to ensure that the information provided by [address] is accurate, up-to-date, and complete, but no guarantee is made to that effect. Drug information contained herein may be time sensitive. [employer] information has been compiled for use by healthcare practitioners and consumers in the [facility] and therefore [employer] does not warrant that uses outside of the [facility] are appropriate, unless specifically indicated otherwise. [facility] drug information does not endorse drugs, diagnose patients or recommend therapy. [employer] drug information is an informational resource designed to assist licensed healthcare practitioners in caring for their patients and/or to serve consumers viewing this service as a supplement to, and not a substitute for, the expertise, skill, knowledge and judgment of healthcare practitioners. The absence of a warning for a given drug or drug combination in no way should be construed to indicate that the drug or drug combination is safe, effective or appropriate for any given patient.  [employer] does not assume any responsibility for any aspect of healthcare administered with the aid of information [facility] provides. The information contained herein is not intended to cover all possible uses, directions, precautions, warnings, drug interactions, allergic reactions, or adverse effects. If you have questions about the drugs you are taking, check with your doctor, nurse or pharmacist.  Copyright 2748-4397 [address] [place] [address] Dr MULLINS Version: 4.01. Revision date: 3/15/2017. Care instructions adapted under license by [facility] ([facility]). If you have questions about a medical condition or this instruction, always ask your healthcare professional. [place] [address] any warranty or liability for your use of this information. Patient Education        Earache: Care Instructions  Your Care Instructions     Even though infection is a common cause of ear pain, not all ear pain means an infection. If you have ear pain and don't have an infection, it could be because of a jaw problem, such as temporomandibular joint (TMJ) pain. Or it could be because of a neck problem. When ear discomfort or pain is mild or comes and goes without other symptoms, home treatment may be all you need. Follow-up care is a key part of your treatment and safety. Be sure to make and go to all appointments, and call your doctor if you are having problems. It's also a good idea to know your test results and keep a list of the medicines you take. How can you care for yourself at home? · Apply heat on the ear to ease pain. To apply heat, put a warm water bottle, a heating pad set on low, or a warm cloth on your ear. Do not go to sleep with a heating pad on your skin. · Take an over-the-counter pain medicine, such as acetaminophen (Tylenol), ibuprofen (Advil, Motrin), or naproxen (Aleve). Be safe with medicines. Read and follow all instructions on the label.   · Do not take two or more pain medicines at the same time unless the doctor told you to. Many pain medicines have acetaminophen, which is Tylenol. Too much acetaminophen (Tylenol) can be harmful. · Never insert anything, such as a cotton swab or a abdoul pin, into the ear. When should you call for help? Call your doctor now or seek immediate medical care if:    · You have new or worse symptoms of infection, such as:  ? Increased pain, swelling, warmth, or redness. ? Red streaks leading from the area. ? Pus draining from the area. ? A fever. Watch closely for changes in your health, and be sure to contact your doctor if:    · You have new or worse discharge coming from the ear.     · You do not get better as expected. Where can you learn more? Go to https://Lumiant.MtoV. org and sign in to your [employer] account. Enter H322 in the [facility] box to learn more about \"Earache: Care Instructions. \"     If you do not have an account, please click on the \"Sign Up Now\" link. Current as of: April 15, 2020               Content Version: 12.6  © 7488-5118 [employer]. Care instructions adapted under license by [facility] ([facility]). If you have questions about a medical condition or this instruction, always ask your healthcare professional. [place] [address] any warranty or liability for your use of this information.

## 2021-02-19 NOTE — PROGRESS NOTES
partner violence     Fear of current or ex partner: Not on file     Emotionally abused: Not on file     Physically abused: Not on file     Forced sexual activity: Not on file   Other Topics Concern    Not on file   Social History Narrative    Not on file     No family history on file. Allergies   Allergen Reactions    Penicillins Hives     Current Outpatient Medications   Medication Sig Dispense Refill    meloxicam (MOBIC) 15 MG tablet Take 15 mg by mouth daily      clarithromycin (BIAXIN) 500 MG tablet Take 1 tablet by mouth 2 times daily for 10 days 20 tablet 0     Current Facility-Administered Medications   Medication Dose Route Frequency Provider Last Rate Last Admin    lidocaine-EPINEPHrine 2 [id number] injection 3 mL  3 mL Intradermal Once Jennifer Marie, APRN - CNP              Review of Systems   Constitutional: Positive for fatigue. Negative for chills and fever. HENT: Positive for ear pain. Negative for congestion, ear discharge, sinus pressure, sinus pain and sore throat. Respiratory: Negative for cough, shortness of breath and wheezing. Gastrointestinal: Negative for diarrhea, nausea and vomiting. Genitourinary: Negative for difficulty urinating. Musculoskeletal: Negative for myalgias. Skin: Negative for rash. Neurological: Positive for dizziness. Negative for light-headedness and headaches. Psychiatric/Behavioral: Negative for agitation, confusion and hallucinations. Objective:   /82   Pulse 80   Temp 97.8 °F (36.6 °C)   Resp 12   Ht 5' 3\" (1.6 m)   Wt 162 lb (73.5 kg)   SpO2 97%   BMI 28.70 kg/m²     Physical Exam  Vitals signs reviewed. Constitutional:       Appearance: Normal appearance. HENT:      Head: Normocephalic and atraumatic. Right Ear: There is impacted cerumen. Left Ear:  No middle ear effusion. There is no impacted cerumen. Tympanic membrane is not injected, scarred, erythematous, retracted or bulging.       Ears: Comments: Canal red after irrigation     Nose: Nose normal.      Right Sinus: No maxillary sinus tenderness or frontal sinus tenderness. Left Sinus: No maxillary sinus tenderness or frontal sinus tenderness. Mouth/Throat:      Lips: Pink. Mouth: Mucous membranes are moist.   Eyes:      General: Lids are normal.      Conjunctiva/sclera: Conjunctivae normal.   Neck:      Musculoskeletal: Normal range of motion. Cardiovascular:      Rate and Rhythm: Normal rate. Pulmonary:      Effort: Pulmonary effort is normal.   Skin:     General: Skin is warm and dry. Findings: No rash. Neurological:      General: No focal deficit present. Mental Status: She is alert and oriented to person, place, and time. Psychiatric:         Mood and Affect: Mood normal.         Behavior: Behavior normal. Behavior is cooperative. Assessment:       Diagnosis Orders   1. Impacted cerumen of right ear  NE REMOVAL IMPACTED CERUMEN IRRIGATION/LVG UNILAT   2. Otalgia of right ear  clarithromycin (BIAXIN) 500 MG tablet     No results found for this visit on 02/19/21. Plan:   Per the MA pt kept pulling her head away when trying to irrigate. The wax appears more soft now, still cannot visualize TM. There is redness in the canal from where the wax has ripped off. Will do an antibiotic. Instructed Debrox and then she may come back to get ear flushed more once her ear is feeling better. Assessment & Plan   Simi Duarte was seen today for otalgia and dizziness. Diagnoses and all orders for this visit:    Impacted cerumen of right ear  -     NE REMOVAL IMPACTED CERUMEN IRRIGATION/LVG UNILAT    Otalgia of right ear  -     clarithromycin (BIAXIN) 500 MG tablet;  Take 1 tablet by mouth 2 times daily for 10 days      Orders Placed This Encounter   Procedures    NE REMOVAL IMPACTED CERUMEN IRRIGATION/LVG UNILAT     Orders Placed This Encounter   Medications    clarithromycin (BIAXIN) 500 MG tablet     Sig: Take 1 tablet by mouth 2 times daily for 10 days     Dispense:  20 tablet     Refill:  0     Medications Discontinued During This Encounter   Medication Reason    albuterol sulfate HFA (PROAIR HFA) 108 (90 Base) MCG/ACT inhaler Therapy completed    fluticasone (FLONASE) 50 MCG/ACT nasal spray Therapy completed    hydrocortisone 2.5 % cream Therapy completed    ipratropium (ATROVENT) 0.03 % nasal spray Therapy completed    nabumetone (RELAFEN) 500 MG tablet Therapy completed    clarithromycin (BIAXIN) 500 MG tablet REORDER     Return if symptoms worsen or fail to improve. Reviewed with the patient/family: current clinical status & medications. Side effects of the medication prescribed today, as well as treatment plan/rationale and result expectations have been discussed with the patient/family who expresses understanding. Patient will be discharged home in stable condition. Follow up with PCP to evaluate treatment results or return if symptoms worsen or fail to improve. Discussed signs and symptoms which require immediate follow-up in ED/call to 911. Understanding verbalized. I have reviewed the patient's medical history in detail and updated the computerized patient record.     Aris Roper, APRN - CNP

## 2021-04-07 ENCOUNTER — OFFICE VISIT (OUTPATIENT)
Dept: FAMILY MEDICINE CLINIC | Age: 71
End: 2021-04-07
Payer: MEDICARE

## 2021-04-07 VITALS
BODY MASS INDEX: 29.23 KG/M2 | TEMPERATURE: 98.1 F | OXYGEN SATURATION: 98 % | DIASTOLIC BLOOD PRESSURE: 72 MMHG | HEIGHT: 63 IN | WEIGHT: 165 LBS | SYSTOLIC BLOOD PRESSURE: 124 MMHG | HEART RATE: 85 BPM

## 2021-04-07 DIAGNOSIS — M54.32 SCIATICA OF LEFT SIDE: Primary | ICD-10-CM

## 2021-04-07 PROCEDURE — 99213 OFFICE O/P EST LOW 20 MIN: CPT | Performed by: PHYSICIAN ASSISTANT

## 2021-04-07 RX ORDER — METHYLPREDNISOLONE 4 MG/1
TABLET ORAL
Qty: 1 KIT | Refills: 0 | Status: SHIPPED | OUTPATIENT
Start: 2021-04-07 | End: 2021-04-13

## 2021-04-07 RX ORDER — TIZANIDINE 2 MG/1
2 TABLET ORAL EVERY 6 HOURS PRN
Qty: 30 TABLET | Refills: 0 | Status: SHIPPED | OUTPATIENT
Start: 2021-04-07 | End: 2021-05-17 | Stop reason: CLARIF

## 2021-04-07 SDOH — ECONOMIC STABILITY: INCOME INSECURITY: HOW HARD IS IT FOR YOU TO PAY FOR THE VERY BASICS LIKE FOOD, HOUSING, MEDICAL CARE, AND HEATING?: NOT HARD AT ALL

## 2021-04-07 SDOH — ECONOMIC STABILITY: FOOD INSECURITY: WITHIN THE PAST 12 MONTHS, YOU WORRIED THAT YOUR FOOD WOULD RUN OUT BEFORE YOU GOT MONEY TO BUY MORE.: NEVER TRUE

## 2021-04-07 SDOH — ECONOMIC STABILITY: TRANSPORTATION INSECURITY
IN THE PAST 12 MONTHS, HAS LACK OF TRANSPORTATION KEPT YOU FROM MEETINGS, WORK, OR FROM GETTING THINGS NEEDED FOR DAILY LIVING?: NO

## 2021-04-07 SDOH — ECONOMIC STABILITY: TRANSPORTATION INSECURITY
IN THE PAST 12 MONTHS, HAS THE LACK OF TRANSPORTATION KEPT YOU FROM MEDICAL APPOINTMENTS OR FROM GETTING MEDICATIONS?: NO

## 2021-04-07 ASSESSMENT — ENCOUNTER SYMPTOMS
ALLERGIC/IMMUNOLOGIC NEGATIVE: 1
CHOKING: 0
ABDOMINAL PAIN: 0
EYE DISCHARGE: 0
SHORTNESS OF BREATH: 0
BOWEL INCONTINENCE: 0
EYES NEGATIVE: 1
BACK PAIN: 1

## 2021-04-07 NOTE — PATIENT INSTRUCTIONS
Patient Education        Back Stretches: Exercises  Introduction  Here are some examples of exercises for stretching your back. Start each exercise slowly. Ease off the exercise if you start to have pain. Your doctor or physical therapist will tell you when you can start these exercises and which ones will work best for you. How to do the exercises  Overhead stretch   1. Stand comfortably with your feet shoulder-width apart. 2. Looking straight ahead, raise both arms over your head and reach toward the ceiling. Do not allow your head to tilt back. 3. Hold for 15 to 30 seconds, then lower your arms to your sides. 4. Repeat 2 to 4 times. Side stretch   1. Stand comfortably with your feet shoulder-width apart. 2. Raise one arm over your head, and then lean to the other side. 3. Slide your hand down your leg as you let the weight of your arm gently stretch your side muscles. Hold for 15 to 30 seconds. 4. Repeat 2 to 4 times on each side. Press-up   1. Lie on your stomach, supporting your body with your forearms. 2. Press your elbows down into the floor to raise your upper back. As you do this, relax your stomach muscles and allow your back to arch without using your back muscles. As your press up, do not let your hips or pelvis come off the floor. 3. Hold for 15 to 30 seconds, then relax. 4. Repeat 2 to 4 times. Relax and rest   1. Lie on your back with a rolled towel under your neck and a pillow under your knees. Extend your arms comfortably to your sides. 2. Relax and breathe normally. 3. Remain in this position for about 10 minutes. 4. If you can, do this 2 or 3 times each day. Follow-up care is a key part of your treatment and safety. Be sure to make and go to all appointments, and call your doctor if you are having problems. It's also a good idea to know your test results and keep a list of the medicines you take. Where can you learn more? Go to https://nuno.[email or web address] org and sign in to your Apoll[employer] account. Enter G858 in the [employer] box to learn more about \"Back Stretches: Exercises. \"     If you do not have an account, please click on the \"Sign Up Now\" link. Current as of: November 16, 2020               Content Version: 12.8  © 2006-2021 [employer]. Care instructions adapted under license by [facility] [place] ([facility]). If you have questions about a medical condition or this instruction, always ask your healthcare professional. [address] any warranty or liability for your use of this information. Patient Education        Sciatica: Exercises  Introduction  Here are some examples of typical rehabilitation exercises for your condition. Start each exercise slowly. Ease off the exercise if you start to have pain. Your doctor or physical therapist will tell you when you can start these exercises and which ones will work best for you. When you are not being active, find a comfortable position for rest. Some people are comfortable on the floor or a medium-firm bed with a small pillow under their head and another under their knees. Some people prefer to lie on their side with a pillow between their knees. Don't stay in one position for too long. Take short walks (10 to 20 minutes) every 2 to 3 hours. Avoid slopes, hills, and stairs until you feel better. Walk only distances you can manage without pain, especially leg pain. How to do the exercises  Back stretches   5. Get down on your hands and knees on the floor. 6. Relax your head and allow it to droop. Round your back up toward the ceiling until you feel a nice stretch in your upper, middle, and lower back. Hold this stretch for as long as it feels comfortable, or about 15 to 30 seconds. 7. Return to the starting position with a flat back while you are on your hands and knees. 8. Let your back sway by pressing your stomach toward the floor.  Lift your buttocks toward the ceiling. 9. Hold this position for 15 to 30 seconds. 10. Repeat 2 to 4 times. Follow-up care is a key part of your treatment and safety. Be sure to make and go to all appointments, and call your doctor if you are having problems. It's also a good idea to know your test results and keep a list of the medicines you take. Where can you learn more? Go to [email or web address]://[email or web address].[email or web address] org and sign in to your Member[employer] account. Enter E446 in the [employer] box to learn more about \"Sciatica: Exercises. \"     If you do not have an account, please click on the \"Sign Up Now\" link. Current as of: November 16, 2020               Content Version: 12.8  © 2006-2021 [employer]wise[employer]. Care instructions adapted under license by [facility] ([facility]). If you have questions about a medical condition or this instruction, always ask your healthcare professional. [place] [address] any warranty or liability for your use of this information.

## 2021-04-07 NOTE — PROGRESS NOTES
she has never smoked. She has never used smokeless tobacco. She reports current alcohol use. She reports that she does not use drugs. PHYSICAL EXAM     VITALS  BP: 124/72, Temp: 98.1 °F (36.7 °C), Pulse: 85,  , SpO2: 98 %  Physical Exam  Vitals signs and nursing note reviewed. Constitutional:       General: She is not in acute distress. Appearance: Normal appearance. She is obese. She is not ill-appearing, toxic-appearing or diaphoretic. Cardiovascular:      Rate and Rhythm: Normal rate and regular rhythm. Pulses: Normal pulses. Heart sounds: Normal heart sounds, S1 normal and S2 normal.   Pulmonary:      Effort: Pulmonary effort is normal.      Breath sounds: Normal air entry. No decreased breath sounds, wheezing, rhonchi or rales. Musculoskeletal:      Lumbar back: She exhibits decreased range of motion (decrease ROM with extension. Pain as well. ), tenderness and pain. Back:    Neurological:      Mental Status: She is alert. READY CARE COURSE   Labs:  No results found for this visit on 04/07/21. IMAGING:  No orders to display     Scheduled Meds:   lidocaine-EPINEPHrine  3 mL Intradermal Once     Continuous Infusions:  PRN Meds:. PROCEDURES:  FINAL IMPRESSION      1. Sciatica of left side      DISPOSITION/PLAN   1. Recommended gentle ROM exercises and stretches. The patient does not want to go to physical therapy at this time. Adverse reactions to medications were discussed during office visit. All questions answered. Discussed signs and symptoms which require immediate follow-up in ED/call to 911. Patient verbalized understanding. On this date 4/7/2021 I have spent 20 minutes reviewing previous notes, test results and face to face with the patient discussing the diagnosis and importance of compliance with the treatment plan as well as documenting on the day of the visit. PATIENT REFERRED TO:  No follow-ups on file.     DISCHARGE MEDICATIONS:  New Prescriptions METHYLPREDNISOLONE (MEDROL, DAVID,) 4 MG TABLET    Take by mouth. TIZANIDINE (ZANAFLEX) 2 MG TABLET    Take 1 tablet by mouth every 6 hours as needed (as needed for spasm)     Cannot display discharge medications since this is not an admission.        Arie Scanlon

## 2021-04-29 ENCOUNTER — NURSE TRIAGE (OUTPATIENT)
Dept: OTHER | Facility: CLINIC | Age: 71
End: 2021-04-29

## 2021-04-29 NOTE — TELEPHONE ENCOUNTER
Reason for Disposition   Depression is worsening (e.g.,sleeping poorly, less able to do activities of daily living)    Answer Assessment - Initial Assessment Questions  1. CONCERN: \"What happened that made you call today? \"      Feeling fatigued, possible depression since retiring in sept 2020    2. DEPRESSION SYMPTOM SCREENING: \"How are you feeling overall? \" (e.g., decreased energy, increased sleeping or difficulty sleeping, difficulty concentrating, feelings of sadness, guilt, hopelessness, or worthlessness)      Decreased energy, fatigue, on and off difficulty sleep, no motivation some days, misses contact with people    3. RISK OF HARM - SUICIDAL IDEATION:  \"Do you ever have thoughts of hurting or killing yourself? \"  (e.g., yes, no, no but preoccupation with thoughts about death)    - INTENT:  \"Do you have thoughts of hurting or killing yourself right NOW? \" (e.g., yes, no, N/A)    - PLAN: \"Do you have a specific plan for how you would do this? \" (e.g., gun, knife, overdose, no plan, N/A)      denies    4. RISK OF HARM - HOMICIDAL IDEATION:  \"Do you ever have thoughts of hurting or killing someone else? \"  (e.g., yes, no, no but preoccupation with thoughts about death)    - INTENT:  \"Do you have thoughts of hurting or killing someone right NOW? \" (e.g., yes, no, N/A)    - PLAN: \"Do you have a specific plan for how you would do this? \" (e.g., gun, knife, no plan, N/A)       Denies    5. FUNCTIONAL IMPAIRMENT: \"How have things been going for you overall in your life? Have you had any more difficulties than usual doing your normal daily activities? \"  (e.g., better, same, worse; self-care, school, work, interactions)      Retired, having to transport brother every day for medical issues, had to quit part time job    6. SUPPORT: \"Who is with you now? \" \"Who do you live with?\" \"Do you have family or friends nearby who you can talk to? \"       Brother    7. THERAPIST: \"Do you have a counselor or therapist? Name? \" denies    8. STRESSORS: \"Has there been any new stress or recent changes in your life? \"          9. DRUG ABUSE/ALCOHOL: \"Do you drink alcohol or use any illegal drugs? \"       Very rarely wine    10. OTHER: \"Do you have any other health or medical symptoms right now? \" (e.g., fever)        denies    11. PREGNANCY: \"Is there any chance you are pregnant? \" \"When was your last menstrual period? \"        n/a    Protocols used: DEPRESSION-ADULT-OH    Received call from Wallace Coleman at pre-service [facility] with Red Flag Complaint. Brief description of triage: Pt called with concerns of potential depression/fatigue/decreased energy/motivation/weight gain/feelings of missing people interaction since retiring in Sept 2020, taking over brothers medical needs. Triage indicates for patient to PCP within 3 days. Care advice provided, patient verbalizes understanding; denies any other questions or concerns; instructed to call back for any new or worsening symptoms. Writer provided warm transfer to [facility] for appointment scheduling. Attention Provider: Thank you for allowing me to participate in the care of your patient. The patient was connected to triage in response to information provided to the ECC. Please do not respond through this encounter as the response is not directed to a shared pool.

## 2021-05-17 ENCOUNTER — OFFICE VISIT (OUTPATIENT)
Dept: FAMILY MEDICINE CLINIC | Age: 71
End: 2021-05-17
Payer: MEDICARE

## 2021-05-17 VITALS
BODY MASS INDEX: 31.01 KG/M2 | RESPIRATION RATE: 16 BRPM | HEIGHT: 63 IN | SYSTOLIC BLOOD PRESSURE: 128 MMHG | DIASTOLIC BLOOD PRESSURE: 84 MMHG | HEART RATE: 88 BPM | WEIGHT: 175 LBS

## 2021-05-17 DIAGNOSIS — Z00.00 ROUTINE GENERAL MEDICAL EXAMINATION AT A HEALTH CARE FACILITY: ICD-10-CM

## 2021-05-17 DIAGNOSIS — Z13.220 SCREENING FOR LIPID DISORDERS: ICD-10-CM

## 2021-05-17 DIAGNOSIS — Z12.31 ENCOUNTER FOR SCREENING MAMMOGRAM FOR MALIGNANT NEOPLASM OF BREAST: ICD-10-CM

## 2021-05-17 DIAGNOSIS — Z71.89 ACP (ADVANCE CARE PLANNING): ICD-10-CM

## 2021-05-17 DIAGNOSIS — F32.89 OTHER DEPRESSION: ICD-10-CM

## 2021-05-17 DIAGNOSIS — F32.89 OTHER DEPRESSION: Primary | ICD-10-CM

## 2021-05-17 DIAGNOSIS — Z13.31 POSITIVE DEPRESSION SCREENING: ICD-10-CM

## 2021-05-17 LAB
ALBUMIN SERPL-MCNC: 4.5 G/DL (ref 3.5–4.6)
ALP BLD-CCNC: 95 U/L (ref 40–130)
ALT SERPL-CCNC: 16 U/L (ref 0–33)
ANION GAP SERPL CALCULATED.3IONS-SCNC: 11 MEQ/L (ref 9–15)
AST SERPL-CCNC: 19 U/L (ref 0–35)
BILIRUB SERPL-MCNC: 0.5 MG/DL (ref 0.2–0.7)
BUN BLDV-MCNC: 13 MG/DL (ref 8–23)
CALCIUM SERPL-MCNC: 9.7 MG/DL (ref 8.5–9.9)
CHLORIDE BLD-SCNC: 106 MEQ/L (ref 95–107)
CHOLESTEROL, TOTAL: 212 MG/DL (ref 0–199)
CO2: 26 MEQ/L (ref 20–31)
CREAT SERPL-MCNC: 0.56 MG/DL (ref 0.5–0.9)
GFR AFRICAN AMERICAN: >60
GFR NON-AFRICAN AMERICAN: >60
GLOBULIN: 2.1 G/DL (ref 2.3–3.5)
GLUCOSE BLD-MCNC: 107 MG/DL (ref 70–99)
HDLC SERPL-MCNC: 106 MG/DL (ref 40–59)
LDL CHOLESTEROL CALCULATED: 95 MG/DL (ref 0–129)
POTASSIUM SERPL-SCNC: 4.1 MEQ/L (ref 3.4–4.9)
SODIUM BLD-SCNC: 143 MEQ/L (ref 135–144)
TOTAL PROTEIN: 6.6 G/DL (ref 6.3–8)
TRIGL SERPL-MCNC: 55 MG/DL (ref 0–150)
TSH SERPL DL<=0.05 MIU/L-ACNC: 3.24 UIU/ML (ref 0.44–3.86)

## 2021-05-17 PROCEDURE — G8427 DOCREV CUR MEDS BY ELIG CLIN: HCPCS | Performed by: NURSE PRACTITIONER

## 2021-05-17 PROCEDURE — 1036F TOBACCO NON-USER: CPT | Performed by: NURSE PRACTITIONER

## 2021-05-17 PROCEDURE — 3017F COLORECTAL CA SCREEN DOC REV: CPT | Performed by: NURSE PRACTITIONER

## 2021-05-17 PROCEDURE — 4040F PNEUMOC VAC/ADMIN/RCVD: CPT | Performed by: NURSE PRACTITIONER

## 2021-05-17 PROCEDURE — G8399 PT W/DXA RESULTS DOCUMENT: HCPCS | Performed by: NURSE PRACTITIONER

## 2021-05-17 PROCEDURE — G8431 POS CLIN DEPRES SCRN F/U DOC: HCPCS | Performed by: NURSE PRACTITIONER

## 2021-05-17 PROCEDURE — 1090F PRES/ABSN URINE INCON ASSESS: CPT | Performed by: NURSE PRACTITIONER

## 2021-05-17 PROCEDURE — G8417 CALC BMI ABV UP PARAM F/U: HCPCS | Performed by: NURSE PRACTITIONER

## 2021-05-17 PROCEDURE — 1123F ACP DISCUSS/DSCN MKR DOCD: CPT | Performed by: NURSE PRACTITIONER

## 2021-05-17 PROCEDURE — G0438 PPPS, INITIAL VISIT: HCPCS | Performed by: NURSE PRACTITIONER

## 2021-05-17 PROCEDURE — 99213 OFFICE O/P EST LOW 20 MIN: CPT | Performed by: NURSE PRACTITIONER

## 2021-05-17 RX ORDER — BUPROPION HYDROCHLORIDE 150 MG/1
150 TABLET, EXTENDED RELEASE ORAL 2 TIMES DAILY
Qty: 60 TABLET | Refills: 5 | Status: SHIPPED | OUTPATIENT
Start: 2021-05-17 | End: 2021-07-29

## 2021-05-17 ASSESSMENT — ENCOUNTER SYMPTOMS
RESPIRATORY NEGATIVE: 1
COLOR CHANGE: 0
CONSTIPATION: 0
EYES NEGATIVE: 1
DIARRHEA: 0
TROUBLE SWALLOWING: 0
SHORTNESS OF BREATH: 0
VOICE CHANGE: 0
RECTAL PAIN: 0
ANAL BLEEDING: 0
ALLERGIC/IMMUNOLOGIC NEGATIVE: 1
ABDOMINAL PAIN: 0
GASTROINTESTINAL NEGATIVE: 1
BLOOD IN STOOL: 0

## 2021-05-17 ASSESSMENT — PATIENT HEALTH QUESTIONNAIRE - PHQ9
SUM OF ALL RESPONSES TO PHQ QUESTIONS 1-9: 10
5. POOR APPETITE OR OVEREATING: 1
6. FEELING BAD ABOUT YOURSELF - OR THAT YOU ARE A FAILURE OR HAVE LET YOURSELF OR YOUR FAMILY DOWN: 1
7. TROUBLE CONCENTRATING ON THINGS, SUCH AS READING THE NEWSPAPER OR WATCHING TELEVISION: 1
SUM OF ALL RESPONSES TO PHQ QUESTIONS 1-9: 9
4. FEELING TIRED OR HAVING LITTLE ENERGY: 1
3. TROUBLE FALLING OR STAYING ASLEEP: 1
2. FEELING DOWN, DEPRESSED OR HOPELESS: 1

## 2021-05-17 ASSESSMENT — LIFESTYLE VARIABLES
HOW OFTEN DO YOU HAVE SIX OR MORE DRINKS ON ONE OCCASION: 0
HOW OFTEN DURING THE LAST YEAR HAVE YOU HAD A FEELING OF GUILT OR REMORSE AFTER DRINKING: 0
HOW OFTEN DURING THE LAST YEAR HAVE YOU FOUND THAT YOU WERE NOT ABLE TO STOP DRINKING ONCE YOU HAD STARTED: 0
HOW OFTEN DURING THE LAST YEAR HAVE YOU BEEN UNABLE TO REMEMBER WHAT HAPPENED THE NIGHT BEFORE BECAUSE YOU HAD BEEN DRINKING: 0
AUDIT TOTAL SCORE: 1
HOW OFTEN DURING THE LAST YEAR HAVE YOU NEEDED AN ALCOHOLIC DRINK FIRST THING IN THE MORNING TO GET YOURSELF GOING AFTER A NIGHT OF HEAVY DRINKING: 0
AUDIT-C TOTAL SCORE: 1
HAS A RELATIVE, FRIEND, DOCTOR, OR ANOTHER HEALTH PROFESSIONAL EXPRESSED CONCERN ABOUT YOUR DRINKING OR SUGGESTED YOU CUT DOWN: 0
HOW OFTEN DO YOU HAVE A DRINK CONTAINING ALCOHOL: 1
HOW OFTEN DURING THE LAST YEAR HAVE YOU FAILED TO DO WHAT WAS NORMALLY EXPECTED FROM YOU BECAUSE OF DRINKING: 0

## 2021-05-17 ASSESSMENT — COLUMBIA-SUICIDE SEVERITY RATING SCALE - C-SSRS: 1. WITHIN THE PAST MONTH, HAVE YOU WISHED YOU WERE DEAD OR WISHED YOU COULD GO TO SLEEP AND NOT WAKE UP?: NO

## 2021-05-17 NOTE — PROGRESS NOTES
Paul Leo (:  1950) is a 70 y.o. female,Established patient, here for evaluation of the following chief complaint(s):  Depression, Fatigue, and Health Maintenance (colonoscopy refused)         ASSESSMENT/PLAN:  1. Other depression  -     TSH Without Reflex; Future  -     buPROPion (WELLBUTRIN SR) 150 MG extended release tablet; Take 1 tablet by mouth 2 times daily, Disp-60 tablet, R-5Normal  2. Positive depression screening  -     Positive Screen for Clinical Depression with a Documented Follow-up Plan   -     buPROPion (WELLBUTRIN SR) 150 MG extended release tablet; Take 1 tablet by mouth 2 times daily, Disp-60 tablet, R-5Normal  3. Encounter for screening mammogram for malignant neoplasm of breast  -     ERICKA DIGITAL SCREEN W OR WO CAD BILATERAL; Future  4. Screening for lipid disorders  -     Lipid Panel; Future  -     Comprehensive Metabolic Panel; Future      Return in about 8 weeks (around 2021). Subjective   SUBJECTIVE/OBJECTIVE:  Depression  Patient complains of depression. She complains of depressed mood, hopelessness and weight gain. Onset was approximately several months ago. Symptoms have been unchanged since that time. Current symptoms include: depressed mood. Patient denies feelings of worthlessness/guilt, hypersomnia, impaired memory, insomnia, psychomotor agitation, psychomotor retardation, recurrent thoughts of death, suicidal attempt, suicidal thoughts with specific plan, suicidal thoughts without plan and weight loss. Family history significant for no psychiatric illness. Possible organic causes contributing are: none. Risk factors: none. Previous treatment includes medication. She complains of the following side effects from the treatment: none this has started since she retired in Sept.        Review of Systems   Constitutional: Negative. Negative for activity change, appetite change, fatigue and unexpected weight change. HENT: Negative.   Negative for dental problem, nosebleeds, trouble swallowing and voice change. Eyes: Negative. Negative for visual disturbance. Respiratory: Negative. Negative for shortness of breath. Cardiovascular: Negative. Negative for chest pain, palpitations and leg swelling. Gastrointestinal: Negative. Negative for abdominal pain, anal bleeding, blood in stool, constipation, diarrhea and rectal pain. Endocrine: Negative. Negative for cold intolerance, heat intolerance, polydipsia, polyphagia and polyuria. Genitourinary: Negative. Musculoskeletal: Negative. Skin: Negative. Negative for color change and rash. Allergic/Immunologic: Negative. Neurological: Negative. Negative for dizziness, syncope, weakness and headaches. Hematological: Negative. Negative for adenopathy. Does not bruise/bleed easily. Psychiatric/Behavioral: Negative. Negative for dysphoric mood and sleep disturbance. The patient is not nervous/anxious. Objective   Physical Exam  Constitutional:       General: She is not in acute distress. Appearance: She is well-developed. HENT:      Head: Normocephalic and atraumatic. Right Ear: External ear normal.      Left Ear: External ear normal.      Nose: Nose normal.   Eyes:      Conjunctiva/sclera: Conjunctivae normal.      Pupils: Pupils are equal, round, and reactive to light. Neck:      Vascular: No JVD. Cardiovascular:      Rate and Rhythm: Normal rate and regular rhythm. Heart sounds: Normal heart sounds. No murmur heard. Pulmonary:      Effort: Pulmonary effort is normal. No respiratory distress. Breath sounds: Normal breath sounds. No wheezing or rales. Abdominal:      General: Bowel sounds are normal. There is no distension. Palpations: Abdomen is soft. There is no mass. Tenderness: There is no abdominal tenderness. Musculoskeletal:      Cervical back: Neck supple. Skin:     General: Skin is warm and dry.       Capillary Refill: Capillary refill takes less than 2 seconds. Neurological:      General: No focal deficit present. Mental Status: She is alert and oriented to person, place, and time. On this date 5/17/2021 I have spent 20 minutes reviewing previous notes, test results and face to face with the patient discussing the diagnosis and importance of compliance with the treatment plan as well as documenting on the day of the visit. An electronic signature was used to authenticate this note.     --NINO Vogel - CNP

## 2021-05-17 NOTE — PATIENT INSTRUCTIONS
cataracts, macular degeneration, and other eye disorders. · A preventive dental visit is recommended every 6 months. · Try to get at least 150 minutes of exercise per week or 10,000 steps per day on a pedometer . · Order or download the FREE \"Exercise & Physical Activity: Your Everyday Guide\" from [employer]. Call 3-350.694.6413 or search [employer] online. · You need 3961-5190 mg of calcium and 4606-5774 IU of vitamin D per day. It is possible to meet your calcium requirement with diet alone, but a vitamin D supplement is usually necessary to meet this goal.  · When exposed to the sun, use a sunscreen that protects against both UVA and UVB radiation with an SPF of 30 or greater. Reapply every 2 to 3 hours or after sweating, drying off with a towel, or swimming. · Always wear a seat belt when traveling in a car. Always wear a helmet when riding a bicycle or motorcycle.

## 2021-07-29 ENCOUNTER — OFFICE VISIT (OUTPATIENT)
Dept: FAMILY MEDICINE CLINIC | Age: 71
End: 2021-07-29
Payer: MEDICARE

## 2021-07-29 VITALS
DIASTOLIC BLOOD PRESSURE: 100 MMHG | RESPIRATION RATE: 16 BRPM | HEART RATE: 78 BPM | WEIGHT: 178 LBS | SYSTOLIC BLOOD PRESSURE: 150 MMHG | BODY MASS INDEX: 31.54 KG/M2 | HEIGHT: 63 IN

## 2021-07-29 DIAGNOSIS — Z13.31 POSITIVE DEPRESSION SCREENING: ICD-10-CM

## 2021-07-29 DIAGNOSIS — F33.0 MAJOR DEPRESSIVE DISORDER, RECURRENT, MILD (HCC): Primary | ICD-10-CM

## 2021-07-29 PROCEDURE — G8417 CALC BMI ABV UP PARAM F/U: HCPCS | Performed by: NURSE PRACTITIONER

## 2021-07-29 PROCEDURE — 1090F PRES/ABSN URINE INCON ASSESS: CPT | Performed by: NURSE PRACTITIONER

## 2021-07-29 PROCEDURE — 4040F PNEUMOC VAC/ADMIN/RCVD: CPT | Performed by: NURSE PRACTITIONER

## 2021-07-29 PROCEDURE — 3017F COLORECTAL CA SCREEN DOC REV: CPT | Performed by: NURSE PRACTITIONER

## 2021-07-29 PROCEDURE — 1123F ACP DISCUSS/DSCN MKR DOCD: CPT | Performed by: NURSE PRACTITIONER

## 2021-07-29 PROCEDURE — G8399 PT W/DXA RESULTS DOCUMENT: HCPCS | Performed by: NURSE PRACTITIONER

## 2021-07-29 PROCEDURE — 99213 OFFICE O/P EST LOW 20 MIN: CPT | Performed by: NURSE PRACTITIONER

## 2021-07-29 PROCEDURE — 1036F TOBACCO NON-USER: CPT | Performed by: NURSE PRACTITIONER

## 2021-07-29 PROCEDURE — G8427 DOCREV CUR MEDS BY ELIG CLIN: HCPCS | Performed by: NURSE PRACTITIONER

## 2021-07-29 RX ORDER — BUPROPION HYDROCHLORIDE 300 MG/1
300 TABLET ORAL EVERY MORNING
Qty: 30 TABLET | Refills: 5 | Status: SHIPPED | OUTPATIENT
Start: 2021-07-29 | End: 2022-03-22

## 2021-07-29 RX ORDER — TOPIRAMATE 25 MG/1
25 TABLET ORAL NIGHTLY
Qty: 30 TABLET | Refills: 5 | Status: SHIPPED | OUTPATIENT
Start: 2021-07-29 | End: 2022-01-05

## 2021-07-29 ASSESSMENT — ENCOUNTER SYMPTOMS
RESPIRATORY NEGATIVE: 1
TROUBLE SWALLOWING: 0
VOICE CHANGE: 0
CONSTIPATION: 0
EYES NEGATIVE: 1
DIARRHEA: 0
BLOOD IN STOOL: 0
RECTAL PAIN: 0
COLOR CHANGE: 0
ANAL BLEEDING: 0
ALLERGIC/IMMUNOLOGIC NEGATIVE: 1
SHORTNESS OF BREATH: 0
GASTROINTESTINAL NEGATIVE: 1
ABDOMINAL PAIN: 0

## 2021-07-29 NOTE — PROGRESS NOTES
Sherri Flores (:  1950) is a 70 y.o. female,Established patient, here for evaluation of the following chief complaint(s):  3 Month Follow-Up, Depression, and Health Maintenance (pt refuse mammogram and colonoscopy)         ASSESSMENT/PLAN:  1. Major depressive disorder, recurrent, mild  -     buPROPion (WELLBUTRIN XL) 300 MG extended release tablet; Take 1 tablet by mouth every morning, Disp-30 tablet, R-5Normal  -     topiramate (TOPAMAX) 25 MG tablet; Take 1 tablet by mouth nightly, Disp-30 tablet, R-5Normal  2. Positive depression screening      Return in about 4 months (around 2021). Subjective   SUBJECTIVE/OBJECTIVE:  Depression  Patient complains of depression. She complains of depressed mood, hopelessness and weight gain. Onset was approximately several months ago. Symptoms have been unchanged since that time. Current symptoms include: depressed mood. Patient denies feelings of worthlessness/guilt, hypersomnia, impaired memory, insomnia, psychomotor agitation, psychomotor retardation, recurrent thoughts of death, suicidal attempt, suicidal thoughts with specific plan, suicidal thoughts without plan and weight loss. Family history significant for no psychiatric illness. Possible organic causes contributing are: none. Risk factors: none. Previous treatment includes medication. She complains of the following side effects from the treatment: none this has started since she retired in Sept.        Review of Systems   Constitutional: Negative. Negative for activity change, appetite change, fatigue and unexpected weight change. HENT: Negative. Negative for dental problem, nosebleeds, trouble swallowing and voice change. Eyes: Negative. Negative for visual disturbance. Respiratory: Negative. Negative for shortness of breath. Cardiovascular: Negative. Negative for chest pain, palpitations and leg swelling. Gastrointestinal: Negative.   Negative for abdominal pain, anal bleeding, blood in stool, constipation, diarrhea and rectal pain. Endocrine: Negative. Negative for cold intolerance, heat intolerance, polydipsia, polyphagia and polyuria. Genitourinary: Negative. Musculoskeletal: Negative. Skin: Negative. Negative for color change and rash. Allergic/Immunologic: Negative. Neurological: Negative. Negative for dizziness, syncope, weakness and headaches. Hematological: Negative. Negative for adenopathy. Does not bruise/bleed easily. Psychiatric/Behavioral: Negative. Negative for dysphoric mood and sleep disturbance. The patient is not nervous/anxious. Objective   Physical Exam  Constitutional:       General: She is not in acute distress. Appearance: She is well-developed. HENT:      Head: Normocephalic and atraumatic. Right Ear: External ear normal.      Left Ear: External ear normal.      Nose: Nose normal.   Eyes:      Conjunctiva/sclera: Conjunctivae normal.      Pupils: Pupils are equal, round, and reactive to light. Neck:      Vascular: No JVD. Cardiovascular:      Rate and Rhythm: Normal rate and regular rhythm. Heart sounds: Normal heart sounds. No murmur heard. Pulmonary:      Effort: Pulmonary effort is normal. No respiratory distress. Breath sounds: Normal breath sounds. No wheezing or rales. Abdominal:      General: Bowel sounds are normal. There is no distension. Palpations: Abdomen is soft. There is no mass. Tenderness: There is no abdominal tenderness. Musculoskeletal:      Cervical back: Neck supple. Skin:     General: Skin is warm and dry. Capillary Refill: Capillary refill takes less than 2 seconds. Neurological:      General: No focal deficit present. Mental Status: She is alert and oriented to person, place, and time.             On this date I have spent 20 minutes reviewing previous notes, test results and face to face with the patient discussing the diagnosis and importance of compliance with the treatment plan as well as documenting on the day of the visit. An electronic signature was used to authenticate this note.     --Shyann Polk, APRN - CNP

## 2021-11-29 ENCOUNTER — OFFICE VISIT (OUTPATIENT)
Dept: FAMILY MEDICINE CLINIC | Age: 71
End: 2021-11-29
Payer: MEDICARE

## 2021-11-29 VITALS
RESPIRATION RATE: 16 BRPM | HEIGHT: 63 IN | HEART RATE: 78 BPM | TEMPERATURE: 97.3 F | DIASTOLIC BLOOD PRESSURE: 70 MMHG | WEIGHT: 177 LBS | SYSTOLIC BLOOD PRESSURE: 132 MMHG | BODY MASS INDEX: 31.36 KG/M2

## 2021-11-29 DIAGNOSIS — F33.0 MAJOR DEPRESSIVE DISORDER, RECURRENT, MILD (HCC): Primary | ICD-10-CM

## 2021-11-29 PROCEDURE — G8484 FLU IMMUNIZE NO ADMIN: HCPCS | Performed by: NURSE PRACTITIONER

## 2021-11-29 PROCEDURE — G8399 PT W/DXA RESULTS DOCUMENT: HCPCS | Performed by: NURSE PRACTITIONER

## 2021-11-29 PROCEDURE — 1123F ACP DISCUSS/DSCN MKR DOCD: CPT | Performed by: NURSE PRACTITIONER

## 2021-11-29 PROCEDURE — 99213 OFFICE O/P EST LOW 20 MIN: CPT | Performed by: NURSE PRACTITIONER

## 2021-11-29 PROCEDURE — 1036F TOBACCO NON-USER: CPT | Performed by: NURSE PRACTITIONER

## 2021-11-29 PROCEDURE — 1090F PRES/ABSN URINE INCON ASSESS: CPT | Performed by: NURSE PRACTITIONER

## 2021-11-29 PROCEDURE — 3017F COLORECTAL CA SCREEN DOC REV: CPT | Performed by: NURSE PRACTITIONER

## 2021-11-29 PROCEDURE — G8427 DOCREV CUR MEDS BY ELIG CLIN: HCPCS | Performed by: NURSE PRACTITIONER

## 2021-11-29 PROCEDURE — 4040F PNEUMOC VAC/ADMIN/RCVD: CPT | Performed by: NURSE PRACTITIONER

## 2021-11-29 PROCEDURE — G8417 CALC BMI ABV UP PARAM F/U: HCPCS | Performed by: NURSE PRACTITIONER

## 2021-11-29 RX ORDER — BUPROPION HYDROCHLORIDE 150 MG/1
TABLET, EXTENDED RELEASE ORAL
COMMUNITY
Start: 2021-09-25 | End: 2021-11-29 | Stop reason: CLARIF

## 2021-11-29 ASSESSMENT — ENCOUNTER SYMPTOMS
EYES NEGATIVE: 1
ALLERGIC/IMMUNOLOGIC NEGATIVE: 1
CONSTIPATION: 0
DIARRHEA: 0
ABDOMINAL PAIN: 0
RECTAL PAIN: 0
RESPIRATORY NEGATIVE: 1
BLOOD IN STOOL: 0
GASTROINTESTINAL NEGATIVE: 1
SHORTNESS OF BREATH: 0
ANAL BLEEDING: 0
COLOR CHANGE: 0
VOICE CHANGE: 0
TROUBLE SWALLOWING: 0

## 2021-11-29 NOTE — PROGRESS NOTES
Jody Hoang (:  1950) is a 70 y.o. female,Established patient, here for evaluation of the following chief complaint(s):  Depression, Check-Up, and Health Maintenance (refused)         ASSESSMENT/PLAN:  1. Major depressive disorder, recurrent, mild      No follow-ups on file. Subjective   SUBJECTIVE/OBJECTIVE:  Depression  Patient complains of depression. She complains of depressed mood, hopelessness and weight gain. Onset was approximately several months ago. Symptoms have been unchanged since that time. Current symptoms include: depressed mood. Patient denies feelings of worthlessness/guilt, hypersomnia, impaired memory, insomnia, psychomotor agitation, psychomotor retardation, recurrent thoughts of death, suicidal attempt, suicidal thoughts with specific plan, suicidal thoughts without plan and weight loss. Family history significant for no psychiatric illness. Possible organic causes contributing are: none. Risk factors: none. Previous treatment includes medication. She complains of the following side effects from the treatment: none this has started since she retired in Sept.        Review of Systems   Constitutional: Negative. Negative for activity change, appetite change, fatigue and unexpected weight change. HENT: Negative. Negative for dental problem, nosebleeds, trouble swallowing and voice change. Eyes: Negative. Negative for visual disturbance. Respiratory: Negative. Negative for shortness of breath. Cardiovascular: Negative. Negative for chest pain, palpitations and leg swelling. Gastrointestinal: Negative. Negative for abdominal pain, anal bleeding, blood in stool, constipation, diarrhea and rectal pain. Endocrine: Negative. Negative for cold intolerance, heat intolerance, polydipsia, polyphagia and polyuria. Genitourinary: Negative. Musculoskeletal: Negative. Skin: Negative. Negative for color change and rash. Allergic/Immunologic: Negative. Neurological: Negative. Negative for dizziness, syncope, weakness and headaches. Hematological: Negative. Negative for adenopathy. Does not bruise/bleed easily. Psychiatric/Behavioral: Negative. Negative for dysphoric mood and sleep disturbance. The patient is not nervous/anxious. Objective   Physical Exam  Constitutional:       General: She is not in acute distress. Appearance: She is well-developed. HENT:      Head: Normocephalic and atraumatic. Right Ear: External ear normal.      Left Ear: External ear normal.      Nose: Nose normal.   Eyes:      Conjunctiva/sclera: Conjunctivae normal.      Pupils: Pupils are equal, round, and reactive to light. Neck:      Vascular: No JVD. Cardiovascular:      Rate and Rhythm: Normal rate and regular rhythm. Heart sounds: Normal heart sounds. No murmur heard. Pulmonary:      Effort: Pulmonary effort is normal. No respiratory distress. Breath sounds: Normal breath sounds. No wheezing or rales. Abdominal:      General: Bowel sounds are normal. There is no distension. Palpations: Abdomen is soft. There is no mass. Tenderness: There is no abdominal tenderness. Musculoskeletal:      Cervical back: Neck supple. Skin:     General: Skin is warm and dry. Capillary Refill: Capillary refill takes less than 2 seconds. Neurological:      General: No focal deficit present. Mental Status: She is alert and oriented to person, place, and time. On this date I have spent 20 minutes reviewing previous notes, test results and face to face with the patient discussing the diagnosis and importance of compliance with the treatment plan as well as documenting on the day of the visit. An electronic signature was used to authenticate this note.     --NINO Rae - CNP

## 2022-01-05 DIAGNOSIS — F33.0 MAJOR DEPRESSIVE DISORDER, RECURRENT, MILD (HCC): ICD-10-CM

## 2022-01-06 RX ORDER — TOPIRAMATE 25 MG/1
TABLET ORAL
Qty: 90 TABLET | Refills: 1 | Status: SHIPPED | OUTPATIENT
Start: 2022-01-06 | End: 2022-05-02 | Stop reason: SDUPTHER

## 2022-01-09 ENCOUNTER — OFFICE VISIT (OUTPATIENT)
Dept: FAMILY MEDICINE CLINIC | Age: 72
End: 2022-01-09
Payer: MEDICARE

## 2022-01-09 VITALS
OXYGEN SATURATION: 96 % | SYSTOLIC BLOOD PRESSURE: 124 MMHG | HEART RATE: 83 BPM | DIASTOLIC BLOOD PRESSURE: 80 MMHG | WEIGHT: 175 LBS | HEIGHT: 63 IN | TEMPERATURE: 98.3 F | BODY MASS INDEX: 31.01 KG/M2

## 2022-01-09 DIAGNOSIS — J32.1 CHRONIC FRONTAL SINUSITIS: Primary | ICD-10-CM

## 2022-01-09 PROCEDURE — 1123F ACP DISCUSS/DSCN MKR DOCD: CPT | Performed by: NURSE PRACTITIONER

## 2022-01-09 PROCEDURE — 4040F PNEUMOC VAC/ADMIN/RCVD: CPT | Performed by: NURSE PRACTITIONER

## 2022-01-09 PROCEDURE — G8484 FLU IMMUNIZE NO ADMIN: HCPCS | Performed by: NURSE PRACTITIONER

## 2022-01-09 PROCEDURE — 99213 OFFICE O/P EST LOW 20 MIN: CPT | Performed by: NURSE PRACTITIONER

## 2022-01-09 PROCEDURE — 1090F PRES/ABSN URINE INCON ASSESS: CPT | Performed by: NURSE PRACTITIONER

## 2022-01-09 PROCEDURE — G8427 DOCREV CUR MEDS BY ELIG CLIN: HCPCS | Performed by: NURSE PRACTITIONER

## 2022-01-09 PROCEDURE — G8417 CALC BMI ABV UP PARAM F/U: HCPCS | Performed by: NURSE PRACTITIONER

## 2022-01-09 PROCEDURE — G8399 PT W/DXA RESULTS DOCUMENT: HCPCS | Performed by: NURSE PRACTITIONER

## 2022-01-09 PROCEDURE — 1036F TOBACCO NON-USER: CPT | Performed by: NURSE PRACTITIONER

## 2022-01-09 PROCEDURE — 3017F COLORECTAL CA SCREEN DOC REV: CPT | Performed by: NURSE PRACTITIONER

## 2022-01-09 RX ORDER — METHYLPREDNISOLONE 4 MG/1
TABLET ORAL
Qty: 1 KIT | Refills: 0 | Status: SHIPPED | OUTPATIENT
Start: 2022-01-09 | End: 2022-01-15

## 2022-01-09 RX ORDER — DOXYCYCLINE HYCLATE 100 MG
100 TABLET ORAL 2 TIMES DAILY
Qty: 20 TABLET | Refills: 0 | Status: SHIPPED | OUTPATIENT
Start: 2022-01-09 | End: 2022-01-19

## 2022-01-09 ASSESSMENT — ENCOUNTER SYMPTOMS
SORE THROAT: 1
NAUSEA: 0
SINUS PRESSURE: 0
ABDOMINAL PAIN: 0
DIARRHEA: 0
WHEEZING: 0
SHORTNESS OF BREATH: 0
RHINORRHEA: 1
VOMITING: 0
SINUS PAIN: 1
STRIDOR: 0
TROUBLE SWALLOWING: 0
FACIAL SWELLING: 0
VOICE CHANGE: 0
COUGH: 0
CHEST TIGHTNESS: 0

## 2022-01-09 NOTE — PATIENT INSTRUCTIONS
Patient Education        Sinusitis: Care Instructions  Your Care Instructions     Sinusitis is an infection of the lining of the sinus cavities in your head. Sinusitis often follows a cold. It causes pain and pressure in your head and face. In most cases, sinusitis gets better on its own in 1 to 2 weeks. But some mild symptoms may last for several weeks. Sometimes antibiotics are needed. Follow-up care is a key part of your treatment and safety. Be sure to make and go to all appointments, and call your doctor if you are having problems. It's also a good idea to know your test results and keep a list of the medicines you take. How can you care for yourself at home? · Take an over-the-counter pain medicine, such as acetaminophen (Tylenol), ibuprofen (Advil, Motrin), or naproxen (Aleve). Read and follow all instructions on the label. · If the doctor prescribed antibiotics, take them as directed. Do not stop taking them just because you feel better. You need to take the full course of antibiotics. · Be careful when taking over-the-counter cold or flu medicines and Tylenol at the same time. Many of these medicines have acetaminophen, which is Tylenol. Read the labels to make sure that you are not taking more than the recommended dose. Too much acetaminophen (Tylenol) can be harmful. · Breathe warm, moist air from a steamy shower, a hot bath, or a sink filled with hot water. Avoid cold, dry air. Using a humidifier in your home may help. Follow the directions for cleaning the machine. · Use saline (saltwater) nasal washes. This can help keep your nasal passages open and wash out mucus and bacteria. You can buy saline nose drops at a grocery store or drugstore. Or you can make your own at home by adding 1 teaspoon of salt and 1 teaspoon of baking soda to 2 cups of distilled water. If you make your own, fill a bulb syringe with the solution, insert the tip into your nostril, and squeeze gently.  Tony frost nose.  · Put a hot, wet towel or a warm gel pack on your face 3 or 4 times a day for 5 to 10 minutes each time. · Try a decongestant nasal spray like oxymetazoline (Afrin). Do not use it for more than 3 days in a row. Using it for more than 3 days can make your congestion worse. When should you call for help? Call your doctor now or seek immediate medical care if:    · You have new or worse swelling or redness in your face or around your eyes.     · You have a new or higher fever. Watch closely for changes in your health, and be sure to contact your doctor if:    · You have new or worse facial pain.     · The mucus from your nose becomes thicker (like pus) or has new blood in it.     · You are not getting better as expected. Where can you learn more? Go to https://[employer]stream[employer] Group.StreetFire. org and sign in to your [employer] account. Enter K718 in the [employer] box to learn more about \"Sinusitis: Care Instructions. \"     If you do not have an account, please click on the \"Sign Up Now\" link. Current as of: September 8, 2021               Content Version: 13.1  © 6075-7059 [employer]. Care instructions adapted under license by [facility] ([facility]). If you have questions about a medical condition or this instruction, always ask your healthcare professional. [place] [address] any warranty or liability for your use of this information. Patient Education        Rhinitis: Care Instructions  Your Care Instructions  Rhinitis is swelling and irritation in the nose. Allergies and infections are often the cause. Your nose may run or feel stuffy. Other symptoms are itchy and sore eyes, ears, throat, and mouth. If allergies are the cause, your doctor may do tests to find out what you are allergic to. You may be able to stop symptoms if you avoid the things that cause them. Your doctor may suggest or prescribe medicine to ease your symptoms.   Follow-up care is a key part of your treatment and safety. Be sure to make and go to all appointments, and call your doctor if you are having problems. It's also a good idea to know your test results and keep a list of the medicines you take. How can you care for yourself at home? · If your rhinitis is caused by allergies, try to find out what sets off (triggers) your symptoms. Take steps to avoid these triggers. ? Avoid yard work. It can stir up both pollen and mold. ? Do not smoke or allow others to smoke around you. If you need help quitting, talk to your doctor about stop-smoking programs and medicines. These can increase your chances of quitting for good. ? Do not use aerosol sprays, cleaning products, or perfumes. ? If pollen is one of your triggers, close your house and car windows during blooming season. ? Clean your house often to control dust.  ? Keep pets outside. · If your doctor recommends over-the-counter medicines to relieve symptoms, take your medicines exactly as prescribed. Call your doctor if you think you are having a problem with your medicine. · Use saline (saltwater) nasal washes to help keep your nasal passages open and wash out mucus and bacteria. You can buy saline nose drops at a grocery store or drugstore. Or you can make your own at home by adding 1 teaspoon of salt and 1 teaspoon of baking soda to 2 cups of distilled water. If you make your own, fill a bulb syringe with the solution, insert the tip into your nostril, and squeeze gently. Salvadore Sims your nose. When should you call for help? Call your doctor now or seek immediate medical care if:    · You are having trouble breathing. Watch closely for changes in your health, and be sure to contact your doctor if:    · Mucus from your nose gets thicker (like pus) or has new blood in it.     · You have new or worse symptoms.     · You do not get better as expected. Where can you learn more? Go to https://chpetraviseb.[facility]. org and sign in to your We[employer] account. Enter M030 in the [facility] box to learn more about \"Rhinitis: Care Instructions. \"     If you do not have an account, please click on the \"Sign Up Now\" link. Current as of: September 8, 2021               Content Version: 13.1  © 5188-6575 [employer]. Care instructions adapted under license by [facility] ([facility]). If you have questions about a medical condition or this instruction, always ask your healthcare professional. [address] any warranty or liability for your use of this information.

## 2022-01-09 NOTE — PROGRESS NOTES
Subjective:      Patient ID: Maria Ines Humphreys is a 70 y.o. female who presents today for:  Chief Complaint   Patient presents with    Sinus Problem     pt states sinus pressure above eyes, and ears are plugged x 6 days. HPI   Patient is here with c/o sinus pain and pressure about the eyes and ears feeling plugged for about a week and not getting better. Says she is very congested. Says she is not having any drainage. Denies cough or SOB. She has not had fever, GI sx, or loss of taste or smell. She declines Covid testing. She is vaccinated, booster in Dec.  Patient says she has has no exposure to anyone that has been ill that she is aware of. She has HX sinus infections in the past.  Says she has taken Sudafed and using her Floanse. Past Medical History:   Diagnosis Date    Sinus infection      History reviewed. No pertinent surgical history. Social History     Socioeconomic History    Marital status:      Spouse name: Not on file    Number of children: Not on file    Years of education: Not on file    Highest education level: Not on file   Occupational History    Not on file   Tobacco Use    Smoking status: Never Smoker    Smokeless tobacco: Never Used   Substance and Sexual Activity    Alcohol use: Yes    Drug use: No    Sexual activity: Never   Other Topics Concern    Not on file   Social History Narrative    Not on file     Social Determinants of Health     Financial Resource Strain: Low Risk     Difficulty of Paying Living Expenses: Not hard at all   Food Insecurity: No Food Insecurity    Worried About Running Out of Food in the Last Year: Never true    Almaz of Food in the Last Year: Never true   Transportation Needs: No Transportation Needs    Lack of Transportation (Medical): No    Lack of Transportation (Non-Medical):  No   Physical Activity:     Days of Exercise per Week: Not on file    Minutes of Exercise per Session: Not on file   Stress:     Feeling of Stress : Not on file   Social Connections:     Frequency of Communication with Friends and Family: Not on file    Frequency of Social Gatherings with Friends and Family: Not on file    Attends Worship Services: Not on file    Active Member of Clubs or Organizations: Not on file    Attends Club or Organization Meetings: Not on file    Marital Status: Not on file   Intimate Partner Violence:     Fear of Current or Ex-Partner: Not on file    Emotionally Abused: Not on file    Physically Abused: Not on file    Sexually Abused: Not on file   Housing Stability:     Unable to Pay for Housing in the Last Year: Not on file    Number of [place] in the Last Year: Not on file    Unstable Housing in the Last Year: Not on file     History reviewed. No pertinent family history. Allergies   Allergen Reactions    Penicillins Hives     Current Outpatient Medications   Medication Sig Dispense Refill    doxycycline hyclate (VIBRA-TABS) 100 MG tablet Take 1 tablet by mouth 2 times daily for 10 days 20 tablet 0    methylPREDNISolone (MEDROL DOSEPACK) 4 MG tablet Take by mouth. 1 kit 0    topiramate (TOPAMAX) 25 MG tablet TAKE 1 TABLET BY MOUTH EVERY NIGHT 90 tablet 1    buPROPion (WELLBUTRIN XL) 300 MG extended release tablet Take 1 tablet by mouth every morning 30 tablet 5    meloxicam (MOBIC) 15 MG tablet Take 15 mg by mouth daily       No current facility-administered medications for this visit. Review of Systems   Constitutional: Negative for activity change, appetite change, chills, diaphoresis, fatigue, fever and unexpected weight change. HENT: Positive for congestion, ear pain, postnasal drip, rhinorrhea, sinus pain and sore throat. Negative for ear discharge, facial swelling, sinus pressure, sneezing, tinnitus, trouble swallowing and voice change. Respiratory: Negative for cough, chest tightness, shortness of breath, wheezing and stridor. Cardiovascular: Negative for chest pain. Gastrointestinal: Negative for abdominal pain, diarrhea, nausea and vomiting. Musculoskeletal: Negative for arthralgias and myalgias. Skin: Negative for rash. Neurological: Positive for headaches. Negative for dizziness, weakness and light-headedness. Objective:   /80   Pulse 83   Temp 98.3 °F (36.8 °C)   Ht 5' 3\" (1.6 m)   Wt 175 lb (79.4 kg)   SpO2 96%   BMI 31.00 kg/m²     Physical Exam  Vitals reviewed. Constitutional:       General: She is awake. She is not in acute distress. Appearance: Normal appearance. She is well-developed, well-groomed and normal weight. She is not ill-appearing, toxic-appearing or diaphoretic. HENT:      Head: Normocephalic and atraumatic. Right Ear: Hearing normal. There is impacted cerumen. Left Ear: Hearing, ear canal and external ear normal. A middle ear effusion is present. Tympanic membrane is injected and bulging. Nose: Congestion present. No rhinorrhea. Right Turbinates: Swollen. Left Turbinates: Swollen. Right Sinus: Frontal sinus tenderness present. Left Sinus: Frontal sinus tenderness present. Mouth/Throat:      Lips: Pink. Mouth: Mucous membranes are moist. No oral lesions. Dentition: No gum lesions. Tongue: No lesions. Palate: No lesions. Pharynx: Oropharynx is clear. Uvula midline. No pharyngeal swelling, oropharyngeal exudate, posterior oropharyngeal erythema or uvula swelling. Tonsils: No tonsillar exudate or tonsillar abscesses. 0 on the right. 0 on the left. Eyes:      General: Lids are normal.      Extraocular Movements: Extraocular movements intact. Conjunctiva/sclera: Conjunctivae normal.   Neck:      Trachea: Trachea normal.   Cardiovascular:      Rate and Rhythm: Normal rate and regular rhythm. Pulses: Normal pulses.       Heart sounds: Normal heart sounds, S1 normal and S2 normal.   Pulmonary:      Effort: Pulmonary effort is normal.      Breath sounds: Normal breath sounds and air entry. Musculoskeletal:         General: Normal range of motion. Cervical back: Normal range of motion and neck supple. Lymphadenopathy:      Cervical: No cervical adenopathy. Skin:     General: Skin is warm and dry. Capillary Refill: Capillary refill takes less than 2 seconds. Neurological:      General: No focal deficit present. Mental Status: She is alert and oriented to person, place, and time. Mental status is at baseline. Psychiatric:         Attention and Perception: Attention and perception normal.         Mood and Affect: Mood and affect normal.         Speech: Speech normal.         Behavior: Behavior normal. Behavior is cooperative. Thought Content: Thought content normal.         Cognition and Memory: Cognition and memory normal.         Judgment: Judgment normal.         Assessment:       Diagnosis Orders   1. Chronic frontal sinusitis  doxycycline hyclate (VIBRA-TABS) 100 MG tablet    methylPREDNISolone (MEDROL DOSEPACK) 4 MG tablet     No results found for this visit on 01/09/22. Plan:     Assessment & Plan   Arely Marquez was seen today for sinus problem. Diagnoses and all orders for this visit:    Will treat for bacterial sinus infection. Advised to take ANTB with food and increase fluids. Discussed administration and SE of the ANTB as well as using probiotic or eating yogurt while on the ANTB. Advised may use OTC decongestant and nasal steroid to help with symptoms as needed. Discussed with patient should start to see improvement within 3-4 days and should f/u with worsening sx or with no improvement. Chronic frontal sinusitis  -     doxycycline hyclate (VIBRA-TABS) 100 MG tablet; Take 1 tablet by mouth 2 times daily for 10 days  -     methylPREDNISolone (MEDROL DOSEPACK) 4 MG tablet; Take by mouth. No orders of the defined types were placed in this encounter.     Orders Placed This Encounter   Medications    doxycycline hyclate (VIBRA-TABS) 100 MG tablet     Sig: Take 1 tablet by mouth 2 times daily for 10 days     Dispense:  20 tablet     Refill:  0    methylPREDNISolone (MEDROL DOSEPACK) 4 MG tablet     Sig: Take by mouth. Dispense:  1 kit     Refill:  0     There are no discontinued medications. Return for if symptoms do not improve in 3-5 days, worsening of condition. Reviewed with the patient/family: current clinical status & medications. Side effects of the medication prescribed today, as well as treatment plan/rationale and result expectations have been discussed with the patient/family who expresses understanding. Patient will be discharged home in stable condition. Follow up with PCP to evaluate treatment results or return if symptoms worsen or fail to improve. Discussed signs and symptoms which require immediate follow-up in ED/call to 911. Understanding verbalized. I have reviewed the patient's medical history in detail and updated the computerized patient record.     Olivia Pratt, NINO - CNP

## 2022-03-08 ENCOUNTER — OFFICE VISIT (OUTPATIENT)
Dept: FAMILY MEDICINE CLINIC | Age: 72
End: 2022-03-08
Payer: MEDICARE

## 2022-03-08 VITALS
RESPIRATION RATE: 15 BRPM | WEIGHT: 174 LBS | HEIGHT: 63 IN | BODY MASS INDEX: 30.83 KG/M2 | SYSTOLIC BLOOD PRESSURE: 122 MMHG | DIASTOLIC BLOOD PRESSURE: 78 MMHG | HEART RATE: 84 BPM

## 2022-03-08 DIAGNOSIS — F33.0 MAJOR DEPRESSIVE DISORDER, RECURRENT, MILD (HCC): Primary | ICD-10-CM

## 2022-03-08 DIAGNOSIS — Z13.220 SCREENING FOR LIPID DISORDERS: ICD-10-CM

## 2022-03-08 DIAGNOSIS — Z13.1 ENCOUNTER FOR SCREENING EXAMINATION FOR IMPAIRED GLUCOSE REGULATION AND DIABETES MELLITUS: ICD-10-CM

## 2022-03-08 PROCEDURE — G8427 DOCREV CUR MEDS BY ELIG CLIN: HCPCS | Performed by: NURSE PRACTITIONER

## 2022-03-08 PROCEDURE — G8484 FLU IMMUNIZE NO ADMIN: HCPCS | Performed by: NURSE PRACTITIONER

## 2022-03-08 PROCEDURE — 4040F PNEUMOC VAC/ADMIN/RCVD: CPT | Performed by: NURSE PRACTITIONER

## 2022-03-08 PROCEDURE — G8417 CALC BMI ABV UP PARAM F/U: HCPCS | Performed by: NURSE PRACTITIONER

## 2022-03-08 PROCEDURE — G8399 PT W/DXA RESULTS DOCUMENT: HCPCS | Performed by: NURSE PRACTITIONER

## 2022-03-08 PROCEDURE — 3017F COLORECTAL CA SCREEN DOC REV: CPT | Performed by: NURSE PRACTITIONER

## 2022-03-08 PROCEDURE — 1123F ACP DISCUSS/DSCN MKR DOCD: CPT | Performed by: NURSE PRACTITIONER

## 2022-03-08 PROCEDURE — 1036F TOBACCO NON-USER: CPT | Performed by: NURSE PRACTITIONER

## 2022-03-08 PROCEDURE — 1090F PRES/ABSN URINE INCON ASSESS: CPT | Performed by: NURSE PRACTITIONER

## 2022-03-08 PROCEDURE — 99213 OFFICE O/P EST LOW 20 MIN: CPT | Performed by: NURSE PRACTITIONER

## 2022-03-08 RX ORDER — PAROXETINE 10 MG/1
10 TABLET, FILM COATED ORAL DAILY
Qty: 30 TABLET | Refills: 3 | Status: SHIPPED | OUTPATIENT
Start: 2022-03-08 | End: 2022-05-02 | Stop reason: SDUPTHER

## 2022-03-08 RX ORDER — PAROXETINE 10 MG/1
10 TABLET, FILM COATED ORAL DAILY
Qty: 30 TABLET | Refills: 3 | Status: CANCELLED | OUTPATIENT
Start: 2022-03-08

## 2022-03-08 ASSESSMENT — ENCOUNTER SYMPTOMS
EYES NEGATIVE: 1
RESPIRATORY NEGATIVE: 1
SHORTNESS OF BREATH: 0
GASTROINTESTINAL NEGATIVE: 1
TROUBLE SWALLOWING: 0
VOICE CHANGE: 0
ABDOMINAL PAIN: 0
BLOOD IN STOOL: 0
DIARRHEA: 0
RECTAL PAIN: 0
CONSTIPATION: 0
COLOR CHANGE: 0
ANAL BLEEDING: 0
ALLERGIC/IMMUNOLOGIC NEGATIVE: 1

## 2022-03-08 ASSESSMENT — PATIENT HEALTH QUESTIONNAIRE - PHQ9
SUM OF ALL RESPONSES TO PHQ QUESTIONS 1-9: 0
SUM OF ALL RESPONSES TO PHQ QUESTIONS 1-9: 0
7. TROUBLE CONCENTRATING ON THINGS, SUCH AS READING THE NEWSPAPER OR WATCHING TELEVISION: 0
SUM OF ALL RESPONSES TO PHQ QUESTIONS 1-9: 0
SUM OF ALL RESPONSES TO PHQ9 QUESTIONS 1 & 2: 0
2. FEELING DOWN, DEPRESSED OR HOPELESS: 0
1. LITTLE INTEREST OR PLEASURE IN DOING THINGS: 0
9. THOUGHTS THAT YOU WOULD BE BETTER OFF DEAD, OR OF HURTING YOURSELF: 0
SUM OF ALL RESPONSES TO PHQ QUESTIONS 1-9: 0
4. FEELING TIRED OR HAVING LITTLE ENERGY: 0
8. MOVING OR SPEAKING SO SLOWLY THAT OTHER PEOPLE COULD HAVE NOTICED. OR THE OPPOSITE, BEING SO FIGETY OR RESTLESS THAT YOU HAVE BEEN MOVING AROUND A LOT MORE THAN USUAL: 0
6. FEELING BAD ABOUT YOURSELF - OR THAT YOU ARE A FAILURE OR HAVE LET YOURSELF OR YOUR FAMILY DOWN: 0
5. POOR APPETITE OR OVEREATING: 0
3. TROUBLE FALLING OR STAYING ASLEEP: 0
10. IF YOU CHECKED OFF ANY PROBLEMS, HOW DIFFICULT HAVE THESE PROBLEMS MADE IT FOR YOU TO DO YOUR WORK, TAKE CARE OF THINGS AT HOME, OR GET ALONG WITH OTHER PEOPLE: 0

## 2022-03-08 NOTE — PROGRESS NOTES
Will Rincon (:  1950) is a 70 y.o. female,Established patient, here for evaluation of the following chief complaint(s):  Check-Up, Depression, and Health Maintenance (refuse mammogram and colonoscopy)         ASSESSMENT/PLAN:  1. Screening for lipid disorders  -     Lipid Panel; Future  -     Comprehensive Metabolic Panel; Future  2. Major depressive disorder, recurrent, mild ([address]  3. Encounter for screening examination for impaired glucose regulation and diabetes mellitus  -     Comprehensive Metabolic Panel; Future      No follow-ups on file. Subjective   SUBJECTIVE/OBJECTIVE:  Depression  Patient complains of depression. She complains of depressed mood, hopelessness and weight gain. Onset was approximately several months ago. Symptoms have been unchanged since that time. Current symptoms include: depressed mood. Patient denies feelings of worthlessness/guilt, hypersomnia, impaired memory, insomnia, psychomotor agitation, psychomotor retardation, recurrent thoughts of death, suicidal attempt, suicidal thoughts with specific plan, suicidal thoughts without plan and weight loss. Family history significant for no psychiatric illness. Possible organic causes contributing are: none. Risk factors: none. Previous treatment includes welbutrin. She complains of the following side effects from the treatment: none  She states she is feeling much better she still has some irritability but her depression has essentially resolved        Review of Systems   Constitutional: Negative. Negative for activity change, appetite change, fatigue and unexpected weight change. HENT: Negative. Negative for dental problem, nosebleeds, trouble swallowing and voice change. Eyes: Negative. Negative for visual disturbance. Respiratory: Negative. Negative for shortness of breath. Cardiovascular: Negative. Negative for chest pain, palpitations and leg swelling. Gastrointestinal: Negative.   Negative for abdominal pain, anal bleeding, blood in stool, constipation, diarrhea and rectal pain. Endocrine: Negative. Negative for cold intolerance, heat intolerance, polydipsia, polyphagia and polyuria. Genitourinary: Negative. Musculoskeletal: Negative. Skin: Negative. Negative for color change and rash. Allergic/Immunologic: Negative. Neurological: Negative. Negative for dizziness, syncope, weakness and headaches. Hematological: Negative. Negative for adenopathy. Does not bruise/bleed easily. Psychiatric/Behavioral: Negative. Negative for dysphoric mood and sleep disturbance. The patient is not nervous/anxious. Objective   Physical Exam  Constitutional:       General: She is not in acute distress. Appearance: She is well-developed. HENT:      Head: Normocephalic and atraumatic. Right Ear: External ear normal.      Left Ear: External ear normal.      Nose: Nose normal.   Eyes:      Conjunctiva/sclera: Conjunctivae normal.      Pupils: Pupils are equal, round, and reactive to light. Neck:      Vascular: No JVD. Cardiovascular:      Rate and Rhythm: Normal rate and regular rhythm. Heart sounds: Normal heart sounds. No murmur heard. Pulmonary:      Effort: Pulmonary effort is normal. No respiratory distress. Breath sounds: Normal breath sounds. No wheezing or rales. Abdominal:      General: Bowel sounds are normal. There is no distension. Palpations: Abdomen is soft. There is no mass. Tenderness: There is no abdominal tenderness. Musculoskeletal:      Cervical back: Neck supple. Skin:     General: Skin is warm and dry. Capillary Refill: Capillary refill takes less than 2 seconds. Neurological:      General: No focal deficit present. Mental Status: She is alert and oriented to person, place, and time.             On this date I have spent 20 minutes reviewing previous notes, test results and face to face with the patient discussing the diagnosis and importance of compliance with the treatment plan as well as documenting on the day of the visit. An electronic signature was used to authenticate this note.     --Luh Ren, NINO - CNP

## 2022-03-22 DIAGNOSIS — F33.0 MAJOR DEPRESSIVE DISORDER, RECURRENT, MILD (HCC): ICD-10-CM

## 2022-03-22 RX ORDER — BUPROPION HYDROCHLORIDE 300 MG/1
300 TABLET ORAL EVERY MORNING
Qty: 30 TABLET | Refills: 5 | Status: SHIPPED | OUTPATIENT
Start: 2022-03-22 | End: 2022-05-02 | Stop reason: SDUPTHER

## 2022-03-22 NOTE — TELEPHONE ENCOUNTER
Pharmacy requesting medication refill.  Please approve or deny this request.    Rx requested:  Requested Prescriptions     Pending Prescriptions Disp Refills    buPROPion (WELLBUTRIN XL) 300 MG extended release tablet [Pharmacy Med Name: BUPROPION XL 300MG TABLETS] 30 tablet 5     Sig: TAKE 1 TABLET BY MOUTH EVERY MORNING         Last Office Visit:   3/8/2022      Next Visit Date:  Future Appointments   Date Time Provider Kenyetta Gonzalez   5/2/2022  9:00 AM Selina Thornton, [address]

## 2022-05-02 ENCOUNTER — OFFICE VISIT (OUTPATIENT)
Dept: FAMILY MEDICINE CLINIC | Age: 72
End: 2022-05-02
Payer: MEDICARE

## 2022-05-02 VITALS
HEART RATE: 72 BPM | RESPIRATION RATE: 15 BRPM | SYSTOLIC BLOOD PRESSURE: 142 MMHG | HEIGHT: 63 IN | WEIGHT: 171 LBS | DIASTOLIC BLOOD PRESSURE: 82 MMHG | BODY MASS INDEX: 30.3 KG/M2

## 2022-05-02 DIAGNOSIS — F33.0 MAJOR DEPRESSIVE DISORDER, RECURRENT, MILD (HCC): ICD-10-CM

## 2022-05-02 PROCEDURE — 1123F ACP DISCUSS/DSCN MKR DOCD: CPT | Performed by: NURSE PRACTITIONER

## 2022-05-02 PROCEDURE — G8417 CALC BMI ABV UP PARAM F/U: HCPCS | Performed by: NURSE PRACTITIONER

## 2022-05-02 PROCEDURE — 4040F PNEUMOC VAC/ADMIN/RCVD: CPT | Performed by: NURSE PRACTITIONER

## 2022-05-02 PROCEDURE — 99213 OFFICE O/P EST LOW 20 MIN: CPT | Performed by: NURSE PRACTITIONER

## 2022-05-02 PROCEDURE — 1090F PRES/ABSN URINE INCON ASSESS: CPT | Performed by: NURSE PRACTITIONER

## 2022-05-02 PROCEDURE — 3017F COLORECTAL CA SCREEN DOC REV: CPT | Performed by: NURSE PRACTITIONER

## 2022-05-02 PROCEDURE — G8399 PT W/DXA RESULTS DOCUMENT: HCPCS | Performed by: NURSE PRACTITIONER

## 2022-05-02 PROCEDURE — 1036F TOBACCO NON-USER: CPT | Performed by: NURSE PRACTITIONER

## 2022-05-02 PROCEDURE — G8427 DOCREV CUR MEDS BY ELIG CLIN: HCPCS | Performed by: NURSE PRACTITIONER

## 2022-05-02 RX ORDER — TOPIRAMATE 50 MG/1
TABLET, FILM COATED ORAL
Qty: 90 TABLET | Refills: 2 | Status: SHIPPED | OUTPATIENT
Start: 2022-05-02

## 2022-05-02 RX ORDER — BUPROPION HYDROCHLORIDE 300 MG/1
300 TABLET ORAL EVERY MORNING
Qty: 90 TABLET | Refills: 2 | Status: SHIPPED | OUTPATIENT
Start: 2022-05-02

## 2022-05-02 RX ORDER — PAROXETINE 10 MG/1
10 TABLET, FILM COATED ORAL DAILY
Qty: 90 TABLET | Refills: 3 | Status: SHIPPED | OUTPATIENT
Start: 2022-05-02

## 2022-05-02 SDOH — ECONOMIC STABILITY: FOOD INSECURITY: WITHIN THE PAST 12 MONTHS, YOU WORRIED THAT YOUR FOOD WOULD RUN OUT BEFORE YOU GOT MONEY TO BUY MORE.: NEVER TRUE

## 2022-05-02 SDOH — ECONOMIC STABILITY: FOOD INSECURITY: WITHIN THE PAST 12 MONTHS, THE FOOD YOU BOUGHT JUST DIDN'T LAST AND YOU DIDN'T HAVE MONEY TO GET MORE.: NEVER TRUE

## 2022-05-02 ASSESSMENT — SOCIAL DETERMINANTS OF HEALTH (SDOH): HOW HARD IS IT FOR YOU TO PAY FOR THE VERY BASICS LIKE FOOD, HOUSING, MEDICAL CARE, AND HEATING?: NOT HARD AT ALL

## 2022-05-02 ASSESSMENT — ENCOUNTER SYMPTOMS
VOICE CHANGE: 0
RECTAL PAIN: 0
ALLERGIC/IMMUNOLOGIC NEGATIVE: 1
COLOR CHANGE: 0
GASTROINTESTINAL NEGATIVE: 1
DIARRHEA: 0
BLOOD IN STOOL: 0
ABDOMINAL PAIN: 0
TROUBLE SWALLOWING: 0
RESPIRATORY NEGATIVE: 1
EYES NEGATIVE: 1
SHORTNESS OF BREATH: 0
ANAL BLEEDING: 0
CONSTIPATION: 0

## 2022-05-02 NOTE — PROGRESS NOTES
Flakita Elias (:  1950) is a 67 y.o. female,Established patient, here for evaluation of the following chief complaint(s):  Depression (8 wks f/u)         ASSESSMENT/PLAN:  1. Major depressive disorder, recurrent, mild  -     buPROPion (WELLBUTRIN XL) 300 MG extended release tablet; Take 1 tablet by mouth every morning, Disp-90 tablet, R-2Normal  -     topiramate (TOPAMAX) 50 MG tablet; TAKE 1 TABLET BY MOUTH EVERY NIGHT, Disp-90 tablet, R-2Normal      Return in about 6 months (around 2022). Subjective   SUBJECTIVE/OBJECTIVE:  Depression  Patient complains of depression. She complains of depressed mood, hopelessness and weight gain. Onset was approximately several months ago. Symptoms have been unchanged since that time. Current symptoms include: depressed mood. Patient denies feelings of worthlessness/guilt, hypersomnia, impaired memory, insomnia, psychomotor agitation, psychomotor retardation, recurrent thoughts of death, suicidal attempt, suicidal thoughts with specific plan, suicidal thoughts without plan and weight loss. Family history significant for no psychiatric illness. Possible organic causes contributing are: none. Risk factors: none. Previous treatment includes welbutrin. She complains of the following side effects from the treatment: none  She states she is feeling much better and her irratbility has improved        Review of Systems   Constitutional: Negative. Negative for activity change, appetite change, fatigue and unexpected weight change. HENT: Negative. Negative for dental problem, nosebleeds, trouble swallowing and voice change. Eyes: Negative. Negative for visual disturbance. Respiratory: Negative. Negative for shortness of breath. Cardiovascular: Negative. Negative for chest pain, palpitations and leg swelling. Gastrointestinal: Negative. Negative for abdominal pain, anal bleeding, blood in stool, constipation, diarrhea and rectal pain. Endocrine: Negative. Negative for cold intolerance, heat intolerance, polydipsia, polyphagia and polyuria. Genitourinary: Negative. Musculoskeletal: Negative. Skin: Negative. Negative for color change and rash. Allergic/Immunologic: Negative. Neurological: Negative. Negative for dizziness, syncope, weakness and headaches. Hematological: Negative. Negative for adenopathy. Does not bruise/bleed easily. Psychiatric/Behavioral: Negative. Negative for dysphoric mood and sleep disturbance. The patient is not nervous/anxious. Objective   Physical Exam  Constitutional:       General: She is not in acute distress. Appearance: She is well-developed. HENT:      Head: Normocephalic and atraumatic. Right Ear: External ear normal.      Left Ear: External ear normal.      Nose: Nose normal.   Eyes:      Conjunctiva/sclera: Conjunctivae normal.      Pupils: Pupils are equal, round, and reactive to light. Neck:      Vascular: No JVD. Cardiovascular:      Rate and Rhythm: Normal rate and regular rhythm. Heart sounds: Normal heart sounds. No murmur heard. Pulmonary:      Effort: Pulmonary effort is normal. No respiratory distress. Breath sounds: Normal breath sounds. No wheezing or rales. Abdominal:      General: Bowel sounds are normal. There is no distension. Palpations: Abdomen is soft. There is no mass. Tenderness: There is no abdominal tenderness. Musculoskeletal:      Cervical back: Neck supple. Skin:     General: Skin is warm and dry. Capillary Refill: Capillary refill takes less than 2 seconds. Neurological:      General: No focal deficit present. Mental Status: She is alert and oriented to person, place, and time.             On this date I have spent 20 minutes reviewing previous notes, test results and face to face with the patient discussing the diagnosis and importance of compliance with the treatment plan as well as documenting on the day of the visit. An electronic signature was used to authenticate this note.     --NINO Dempsey - CNP

## 2022-05-11 ENCOUNTER — TELEPHONE (OUTPATIENT)
Dept: GASTROENTEROLOGY | Age: 72
End: 2022-05-11

## 2022-07-25 ENCOUNTER — APPOINTMENT (OUTPATIENT)
Dept: CT IMAGING | Age: 72
End: 2022-07-25
Payer: MEDICARE

## 2022-07-25 ENCOUNTER — HOSPITAL ENCOUNTER (EMERGENCY)
Age: 72
Discharge: HOME | End: 2022-07-25
Attending: EMERGENCY MEDICINE
Payer: MEDICARE

## 2022-07-25 VITALS
HEART RATE: 79 BPM | TEMPERATURE: 97.2 F | HEIGHT: 63 IN | RESPIRATION RATE: 15 BRPM | OXYGEN SATURATION: 99 % | DIASTOLIC BLOOD PRESSURE: 96 MMHG | WEIGHT: 180 LBS | BODY MASS INDEX: 31.89 KG/M2 | SYSTOLIC BLOOD PRESSURE: 176 MMHG
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
## 2022-07-25 DIAGNOSIS — S09.90XA INJURY OF HEAD, INITIAL ENCOUNTER: Primary | ICD-10-CM

## 2022-07-25 DIAGNOSIS — S01.01XA LACERATION OF SCALP, INITIAL ENCOUNTER: ICD-10-CM

## 2022-07-25 PROCEDURE — 99285 EMERGENCY DEPT VISIT HI MDM: CPT

## 2022-07-25 PROCEDURE — 70450 CT HEAD/BRAIN W/O DYE: CPT

## 2022-07-25 PROCEDURE — 90715 TDAP VACCINE 7 YRS/> IM: CPT | Performed by: EMERGENCY MEDICINE

## 2022-07-25 PROCEDURE — 6360000002 HC RX W HCPCS: Performed by: EMERGENCY MEDICINE

## 2022-07-25 PROCEDURE — 90471 IMMUNIZATION ADMIN: CPT | Performed by: EMERGENCY MEDICINE

## 2022-07-25 PROCEDURE — 12002 RPR S/N/AX/GEN/TRNK2.6-7.5CM: CPT

## 2022-07-25 PROCEDURE — 6370000000 HC RX 637 (ALT 250 FOR IP): Performed by: EMERGENCY MEDICINE

## 2022-07-25 RX ORDER — CLONIDINE HYDROCHLORIDE 0.1 MG/1
0.1 TABLET ORAL ONCE
Status: COMPLETED | OUTPATIENT
Start: 2022-07-25 | End: 2022-07-25

## 2022-07-25 RX ORDER — HYDROCODONE BITARTRATE AND ACETAMINOPHEN 5; 325 MG/1; MG/1
1 TABLET ORAL ONCE
Status: COMPLETED | OUTPATIENT
Start: 2022-07-25 | End: 2022-07-25

## 2022-07-25 RX ADMIN — TETANUS TOXOID, REDUCED DIPHTHERIA TOXOID AND ACELLULAR PERTUSSIS VACCINE, ADSORBED 0.5 ML: 5; 2.5; 8; 8; 2.5 SUSPENSION INTRAMUSCULAR at 15:54

## 2022-07-25 RX ADMIN — CLONIDINE HYDROCHLORIDE 0.1 MG: 0.1 TABLET ORAL at 17:22

## 2022-07-25 RX ADMIN — HYDROCODONE BITARTRATE AND ACETAMINOPHEN 1 TABLET: 5; 325 TABLET ORAL at 17:54

## 2022-07-25 ASSESSMENT — PAIN - FUNCTIONAL ASSESSMENT: PAIN_FUNCTIONAL_ASSESSMENT: 0-10

## 2022-07-25 ASSESSMENT — PAIN DESCRIPTION - DESCRIPTORS
DESCRIPTORS: THROBBING
DESCRIPTORS: THROBBING
DESCRIPTORS: BURNING;STABBING

## 2022-07-25 ASSESSMENT — PAIN DESCRIPTION - LOCATION
LOCATION: HEAD

## 2022-07-25 ASSESSMENT — PAIN SCALES - GENERAL
PAINLEVEL_OUTOF10: 9
PAINLEVEL_OUTOF10: 10
PAINLEVEL_OUTOF10: 8

## 2022-07-25 NOTE — ED PROVIDER NOTES
[address] [facility] ED  EMERGENCY DEPARTMENT ENCOUNTER      Pt Name: Rakesh Bhakta  MRN: [id number]  Mateusgfraciel 1950  Date of evaluation: 7/25/2022  Provider: Genesis Garland MD    CHIEF COMPLAINT       Chief Complaint   Patient presents with    Fall     Fall off pool ladder, hit back of head on concrete. Laceration noted to back of head. -LOC, -thinners         HISTORY OF PRESENT ILLNESS   (Location/Symptom, Timing/Onset, Context/Setting, Quality, Duration, Modifying Factors, Severity)  Note limiting factors. 77-year-old female presenting after mechanical fall. Nimo Serge backwards out of a pool hitting her head. Did not lose consciousness. Notes a headache but no vomiting. Symptoms occurred about an hour prior to arrival.      Nursing Notes were reviewed. REVIEW OF SYSTEMS    (2-9 systems for level 4, 10 or more for level 5)     Review of Systems   All other systems reviewed and are negative. Except as noted above the remainder of the review of systems was reviewed and negative. PAST MEDICAL HISTORY     Past Medical History:   Diagnosis Date    Sinus infection          SURGICAL HISTORY     No past surgical history on file. CURRENT MEDICATIONS       Current Discharge Medication List        CONTINUE these medications which have NOT CHANGED    Details   buPROPion (WELLBUTRIN XL) 300 MG extended release tablet Take 1 tablet by mouth every morning  Qty: 90 tablet, Refills: 2    Associated Diagnoses: Major depressive disorder, recurrent, mild (HCC)      PARoxetine (PAXIL) 10 MG tablet Take 1 tablet by mouth daily  Qty: 90 tablet, Refills: 3      topiramate (TOPAMAX) 50 MG tablet TAKE 1 TABLET BY MOUTH EVERY NIGHT  Qty: 90 tablet, Refills: 2    Associated Diagnoses: Major depressive disorder, recurrent, mild (HCC)      meloxicam (MOBIC) 15 MG tablet Take 15 mg by mouth daily             ALLERGIES     Penicillins    FAMILY HISTORY     No family history on file.        SOCIAL HISTORY       Social History     Socioeconomic History    Marital status:    Tobacco Use    Smoking status: Never    Smokeless tobacco: Never   Substance and Sexual Activity    Alcohol use: Yes    Drug use: No    Sexual activity: Never     Social Determinants of Health     Financial Resource Strain: Low Risk     Difficulty of Paying Living Expenses: Not hard at all   Food Insecurity: No Food Insecurity    Worried About [address] in the Last Year: Never true    [facility] [place] in the Last Year: Never true       SCREENINGS    [place] Coma Scale  Eye Opening: Spontaneous  Best Verbal Response: Oriented  Best Motor Response: Obeys commands  [place] Coma Scale Score: 15          PHYSICAL EXAM    (up to 7 for level 4, 8 or more for level 5)     ED Triage Vitals [07/25/22 1541]   BP Temp Temp Source Heart Rate Resp SpO2 Height Weight   (!) 195/106 97.2 °F (36.2 °C) Temporal 79 17 98 % 5' 3\" (1.6 m) 180 lb (81.6 kg)       Physical Exam  Vitals and nursing note reviewed. Constitutional:       General: She is not in acute distress. Appearance: Normal appearance. She is well-developed. She is not ill-appearing. HENT:      Head: Normocephalic. Comments: 5cm lac to posterior occiput     Right Ear: Tympanic membrane normal.      Left Ear: Tympanic membrane normal.      Mouth/Throat:      Mouth: Mucous membranes are moist.      Pharynx: Oropharynx is clear. Eyes:      Extraocular Movements: Extraocular movements intact. Conjunctiva/sclera: Conjunctivae normal.      Pupils: Pupils are equal, round, and reactive to light. Cardiovascular:      Rate and Rhythm: Normal rate and regular rhythm. Pulmonary:      Effort: Pulmonary effort is normal.      Breath sounds: Normal breath sounds. Abdominal:      General: Bowel sounds are normal.      Palpations: Abdomen is soft. Tenderness: There is no abdominal tenderness. Musculoskeletal:         General: No deformity. Normal range of motion.       Cervical back: Normal range of motion and neck supple. Skin:     General: Skin is warm and dry. Capillary Refill: Capillary refill takes less than 2 seconds. Neurological:      General: No focal deficit present. Mental Status: She is alert and oriented to person, place, and time. Mental status is at baseline. Cranial Nerves: No cranial nerve deficit. Psychiatric:         Thought Content: Thought content normal.       DIAGNOSTIC RESULTS     EKG: All EKG's are interpreted by the Emergency Department Physician who either signs or Co-signs this chart in the absence of a cardiologist.    RADIOLOGY:   Non-plain film images such as CT, Ultrasound and MRI are read by the radiologist. Plain radiographic images are visualized and preliminarily interpreted by the emergency physician with the below findings:    Interpretation per the Radiologist below, if available at the time of this note:    CT Head WO Contrast   Final Result   No acute intracranial hemorrhage or mass effect. Right occipital scalp subcutaneous soft tissue swelling is seen. LABS:  Labs Reviewed - No data to display    All other labs were within normal range or not returned as of this dictation. EMERGENCY DEPARTMENT COURSE and DIFFERENTIAL DIAGNOSIS/MDM:   Vitals:    Vitals:    07/25/22 1541   BP: (!) 195/106   Pulse: 79   Resp: 17   Temp: 97.2 °F (36.2 °C)   TempSrc: Temporal   SpO2: 98%   Weight: 180 lb (81.6 kg)   Height: 5' 3\" (1.6 m)       MDM  Number of Diagnoses or Management Options  Fall, initial encounter  Injury of head, initial encounter  Laceration of scalp, initial encounter  Diagnosis management comments: CT head is negative, laceration is repaired. Recommend supportive care for home. Patient will be discharged home in good condition. Patient has been hemodynamically stable throughout ED course and is appropriate for outpatient follow up.   Patient should follow up with PCP in 7-10 days for staple removal or return to ED immediately for any new or worsening symptoms. Patient is well appearing on discharge and agreeable with plan of care. Lac Repair    Date/Time: 7/25/2022 4:37 PM  Performed by: Monserrat Zarate MD  Authorized by: Monserrat Zarate MD     Consent:     Consent obtained:  Verbal    Consent given by:  Patient    Risks discussed:  Pain and poor wound healing    Alternatives discussed:  No treatment  Universal protocol:     Procedure explained and questions answered to patient or proxy's satisfaction: yes      Patient identity confirmed:  Verbally with patient  Anesthesia:     Anesthesia method:  None  Laceration details:     Location:  Scalp    Scalp location:  Crown    Length (cm):  6  Exploration:     Contaminated: no    Treatment:     Area cleansed with:  Soap and water    Amount of cleaning:  Standard    Visualized foreign bodies/material removed: no    Skin repair:     Repair method:  Staples    Number of staples:  5  Approximation:     Approximation:  Loose  Repair type:     Repair type:  Simple  Post-procedure details:     Dressing:  Open (no dressing)    Procedure completion:  Tolerated    CRITICAL CARE TIME   Total Critical Care time was 0 minutes, excluding separately reportable procedures. There was a high probability of clinically significant/life threatening deterioration in the patient's condition which required my urgent intervention. FINAL IMPRESSION      1. Injury of head, initial encounter    2. Laceration of scalp, initial encounter    3.  Fall, initial encounter          DISPOSITION/PLAN   DISPOSITION Decision To Discharge 07/25/2022 04:31:20 PM      (Please note that portions of this note were completed with a voice recognition program.  Efforts were made to edit the dictations but occasionally words are mis-transcribed.)    Monserrat Zarate MD (electronically signed)  Attending Emergency Physician

## 2022-07-25 NOTE — ED NOTES
Discharge instructions reviewed with patient. Patient denies any further questions at this time. Pt encouraged to make follow up appointments with PCP and any speciality referrals.       Ben Bailey RN  07/25/22 0896

## 2022-08-03 ENCOUNTER — OFFICE VISIT (OUTPATIENT)
Dept: FAMILY MEDICINE CLINIC | Age: 72
End: 2022-08-03
Payer: MEDICARE

## 2022-08-03 VITALS
TEMPERATURE: 97.6 F | DIASTOLIC BLOOD PRESSURE: 86 MMHG | HEIGHT: 63 IN | SYSTOLIC BLOOD PRESSURE: 136 MMHG | WEIGHT: 178 LBS | OXYGEN SATURATION: 97 % | BODY MASS INDEX: 31.54 KG/M2 | HEART RATE: 88 BPM

## 2022-08-03 DIAGNOSIS — Z91.81 AT HIGH RISK FOR FALLS: ICD-10-CM

## 2022-08-03 DIAGNOSIS — S01.01XD LACERATION OF SCALP WITHOUT FOREIGN BODY, SUBSEQUENT ENCOUNTER: Primary | ICD-10-CM
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
## 2022-08-03 DIAGNOSIS — Z48.02 ENCOUNTER FOR STAPLE REMOVAL: ICD-10-CM

## 2022-08-03 PROCEDURE — 1123F ACP DISCUSS/DSCN MKR DOCD: CPT | Performed by: NURSE PRACTITIONER

## 2022-08-03 PROCEDURE — 3288F FALL RISK ASSESSMENT DOCD: CPT | Performed by: NURSE PRACTITIONER

## 2022-08-03 PROCEDURE — 99213 OFFICE O/P EST LOW 20 MIN: CPT | Performed by: NURSE PRACTITIONER

## 2022-08-03 ASSESSMENT — ENCOUNTER SYMPTOMS
COLOR CHANGE: 0
WHEEZING: 0
COUGH: 0
SHORTNESS OF BREATH: 0

## 2022-08-03 NOTE — PROGRESS NOTES
Subjective:      Patient ID: Raquel Martino is a 67 y.o. female who presents today for:     Chief Complaint   Patient presents with    Suture / Staple Removal     Patient presents today to have 5 staples removed from back of head. Patient was seen at [facility] [place] ED on 07/25/2022. HPI Pt f/u today after falling off a pool ladder and hitting her head on concrete corner. She did not lose conscious. She went to ED and had CT of head which was benign. Had staples put in head. Reports it is tender in certain spots. No drainage no memory loss. Past Medical History:   Diagnosis Date    Sinus infection      No past surgical history on file. No family history on file. Social History     Socioeconomic History    Marital status:       Spouse name: Not on file    Number of children: Not on file    Years of education: Not on file    Highest education level: Not on file   Occupational History    Not on file   Tobacco Use    Smoking status: Never    Smokeless tobacco: Never   Substance and Sexual Activity    Alcohol use: Yes    Drug use: No    Sexual activity: Never   Other Topics Concern    Not on file   Social History Narrative    Not on file     Social Determinants of Health     Financial Resource Strain: Low Risk     Difficulty of Paying Living Expenses: Not hard at all   Food Insecurity: No Food Insecurity    Worried About Running Out of Food in the Last Year: Never true    Ran Out of Food in the Last Year: Never true   Transportation Needs: Not on file   Physical Activity: Not on file   Stress: Not on file   Social Connections: Not on file   Intimate Partner Violence: Not on file   Housing Stability: Not on file     Current Outpatient Medications on File Prior to Visit   Medication Sig Dispense Refill    buPROPion (WELLBUTRIN XL) 300 MG extended release tablet Take 1 tablet by mouth every morning 90 tablet 2    PARoxetine (PAXIL) 10 MG tablet Take 1 tablet by mouth daily 90 tablet 3    topiramate (TOPAMAX) 50 MG tablet TAKE 1 TABLET BY MOUTH EVERY NIGHT 90 tablet 2    meloxicam (MOBIC) 15 MG tablet Take 15 mg by mouth daily       No current facility-administered medications on file prior to visit. Allergies:  Penicillins    Review of Systems   Constitutional:  Negative for chills, fatigue and fever. HENT:  Negative for congestion. Respiratory:  Negative for cough, shortness of breath and wheezing. Cardiovascular:  Negative for chest pain, palpitations and leg swelling. Skin:  Positive for wound. Negative for color change and rash. Neurological:  Negative for dizziness, syncope and headaches. Objective:   /86 (Site: Left Upper Arm, Position: Sitting, Cuff Size: Medium Adult)   Pulse 88   Temp 97.6 °F (36.4 °C) (Temporal)   Ht 5' 3\" (1.6 m)   Wt 178 lb (80.7 kg)   SpO2 97%   BMI 31.53 kg/m²     Physical Exam  Constitutional:       Appearance: She is well-developed. HENT:      Head: Normocephalic. Comments: Well circumcised wound with 5 staples- no drainage. Mild ttp. Right Ear: External ear normal.      Left Ear: External ear normal.      Nose: Nose normal.      Mouth/Throat:      Mouth: Mucous membranes are moist.      Pharynx: Oropharynx is clear. Eyes:      Conjunctiva/sclera: Conjunctivae normal.   Cardiovascular:      Rate and Rhythm: Normal rate and regular rhythm. Heart sounds: Normal heart sounds. Pulmonary:      Effort: Pulmonary effort is normal.      Breath sounds: Normal breath sounds. Musculoskeletal:         General: Normal range of motion. Cervical back: Normal range of motion. Skin:     General: Skin is warm and dry. Neurological:      Mental Status: She is alert and oriented to person, place, and time. Psychiatric:         Mood and Affect: Mood normal.         Behavior: Behavior normal.       Assessment:          Diagnosis Orders   1. Laceration of scalp without foreign body, subsequent encounter   - [place] REMOVAL OF SUTURES      2.

## 2022-08-04 ENCOUNTER — TELEPHONE (OUTPATIENT)
Dept: FAMILY MEDICINE CLINIC | Age: 72
End: 2022-08-04

## 2022-08-04 DIAGNOSIS — S09.90XD TRAUMATIC INJURY OF HEAD, SUBSEQUENT ENCOUNTER: Primary | ICD-10-CM

## 2022-08-04 DIAGNOSIS — S01.01XD LACERATION OF SCALP, SUBSEQUENT ENCOUNTER: ICD-10-CM

## 2022-08-05 NOTE — TELEPHONE ENCOUNTER
Attempted to contact the patient to schedule the CT scan that was ordered - no answer - lmovm for the patient to call the office to schedule or central scheduling along with the phone number

## 2022-08-08 ENCOUNTER — HOSPITAL ENCOUNTER (OUTPATIENT)
Dept: CT IMAGING | Age: 72
Discharge: HOME | End: 2022-08-10
Payer: MEDICARE

## 2022-08-08 DIAGNOSIS — S09.90XD TRAUMATIC INJURY OF HEAD, SUBSEQUENT ENCOUNTER: ICD-10-CM

## 2022-08-08 DIAGNOSIS — S01.01XD LACERATION OF SCALP, SUBSEQUENT ENCOUNTER: ICD-10-CM

## 2022-08-08 PROCEDURE — 70450 CT HEAD/BRAIN W/O DYE: CPT

## 2022-08-12 ENCOUNTER — COMMUNITY OUTREACH (OUTPATIENT)
Dept: FAMILY MEDICINE CLINIC | Age: 72
End: 2022-08-12

## 2022-08-12 NOTE — PROGRESS NOTES
Patient's HM shows they are overdue for Mammogram and Colorectal Screening. Care Everywhere and  files searched. No results to attach to order nor HM updated.      Note says pt refused colonoscopy and mammogram

## 2022-09-01 ENCOUNTER — OFFICE VISIT (OUTPATIENT)
Dept: FAMILY MEDICINE CLINIC | Age: 72
End: 2022-09-01
Payer: MEDICARE

## 2022-09-01 VITALS
WEIGHT: 178 LBS | HEIGHT: 63 IN | BODY MASS INDEX: 31.54 KG/M2 | HEART RATE: 88 BPM | SYSTOLIC BLOOD PRESSURE: 124 MMHG | DIASTOLIC BLOOD PRESSURE: 80 MMHG

## 2022-09-01 DIAGNOSIS — R42 DIZZINESS: ICD-10-CM

## 2022-09-01 DIAGNOSIS — F07.81 POST CONCUSSION SYNDROME: Primary | ICD-10-CM

## 2022-09-01 DIAGNOSIS — G44.301 INTRACTABLE POST-TRAUMATIC HEADACHE, UNSPECIFIED CHRONICITY PATTERN: ICD-10-CM

## 2022-09-01 PROCEDURE — 96372 THER/PROPH/DIAG INJ SC/IM: CPT | Performed by: NURSE PRACTITIONER

## 2022-09-01 PROCEDURE — 99214 OFFICE O/P EST MOD 30 MIN: CPT | Performed by: NURSE PRACTITIONER

## 2022-09-01 PROCEDURE — 1123F ACP DISCUSS/DSCN MKR DOCD: CPT | Performed by: NURSE PRACTITIONER

## 2022-09-01 RX ORDER — KETOROLAC TROMETHAMINE 30 MG/ML
30 INJECTION, SOLUTION INTRAMUSCULAR; INTRAVENOUS ONCE
Status: COMPLETED | OUTPATIENT
Start: 2022-09-01 | End: 2022-09-01

## 2022-09-01 RX ORDER — TRAMADOL HYDROCHLORIDE 50 MG/1
50 TABLET ORAL EVERY 6 HOURS PRN
Qty: 12 TABLET | Refills: 0 | Status: SHIPPED | OUTPATIENT
Start: 2022-09-01 | End: 2022-09-04

## 2022-09-01 RX ADMIN — KETOROLAC TROMETHAMINE 30 MG: 30 INJECTION, SOLUTION INTRAMUSCULAR; INTRAVENOUS at 14:56

## 2022-09-01 ASSESSMENT — ENCOUNTER SYMPTOMS
TROUBLE SWALLOWING: 0
BLOOD IN STOOL: 0
BLURRED VISION: 1
ANAL BLEEDING: 0
ABDOMINAL PAIN: 0
VOMITING: 0
CONSTIPATION: 0
GASTROINTESTINAL NEGATIVE: 1
RESPIRATORY NEGATIVE: 1
DIARRHEA: 0
VOICE CHANGE: 0
ALLERGIC/IMMUNOLOGIC NEGATIVE: 1
COLOR CHANGE: 0
SHORTNESS OF BREATH: 0
RECTAL PAIN: 0

## 2022-09-01 NOTE — PROGRESS NOTES
Subjective  Raquel Martino, 67 y.o. female presents today with:  Chief Complaint   Patient presents with    Head Injury     Pt was injured on 725/22 and got staples put in and t stated where the scar is it is very sensitive and painful and burning it is more sensitive when she is outside         Head Injury   The incident occurred more than 1 week ago (July 25). The injury mechanism was a fall. There was no loss of consciousness. The volume of blood lost was moderate. The quality of the pain is described as throbbing. The pain is moderate. The pain has been constant since the injury. Associated symptoms include blurred vision and headaches. Pertinent negatives include no disorientation, memory loss, numbness, tinnitus, vomiting or weakness. Associated symptoms comments: dizziness. She has tried NSAIDs and acetaminophen for the symptoms. The treatment provided mild relief. Review of Systems   Constitutional: Negative. Negative for activity change, appetite change, fatigue and unexpected weight change. HENT: Negative. Negative for dental problem, nosebleeds, tinnitus, trouble swallowing and voice change. Eyes:  Positive for blurred vision. Negative for visual disturbance. Respiratory: Negative. Negative for shortness of breath. Cardiovascular: Negative. Negative for chest pain, palpitations and leg swelling. Gastrointestinal: Negative. Negative for abdominal pain, anal bleeding, blood in stool, constipation, diarrhea, rectal pain and vomiting. Endocrine: Negative. Negative for cold intolerance, heat intolerance, polydipsia, polyphagia and polyuria. Genitourinary: Negative. Musculoskeletal: Negative. Skin: Negative. Negative for color change and rash. Allergic/Immunologic: Negative. Neurological:  Positive for dizziness, light-headedness and headaches. Negative for tremors, seizures, syncope, facial asymmetry, speech difficulty, weakness and numbness. Hematological: Negative. Negative for adenopathy. Does not bruise/bleed easily. Psychiatric/Behavioral: Negative. Negative for dysphoric mood, memory loss and sleep disturbance. The patient is not nervous/anxious. Past Medical History:   Diagnosis Date    Sinus infection      No past surgical history on file. Social History     Socioeconomic History    Marital status:      Spouse name: Not on file    Number of children: Not on file    Years of education: Not on file    Highest education level: Not on file   Occupational History    Not on file   Tobacco Use    Smoking status: Never    Smokeless tobacco: Never   Substance and Sexual Activity    Alcohol use: Yes    Drug use: No    Sexual activity: Never   Other Topics Concern    Not on file   Social History Narrative    Not on file     Social Determinants of Health     Financial Resource Strain: Low Risk     Difficulty of Paying Living Expenses: Not hard at all   Food Insecurity: No Food Insecurity    Worried About Running Out of Food in the Last Year: Never true    Ran Out of Food in the Last Year: Never true   Transportation Needs: Not on file   Physical Activity: Not on file   Stress: Not on file   Social Connections: Not on file   Intimate Partner Violence: Not on file   Housing Stability: Not on file     No family history on file. Allergies   Allergen Reactions    Penicillins Hives     Current Outpatient Medications   Medication Sig Dispense Refill    traMADol (ULTRAM) 50 MG tablet Take 1 tablet by mouth every 6 hours as needed for Pain for up to 3 days. Intended supply: 3 days.  Take lowest dose possible to manage pain 12 tablet 0    buPROPion (WELLBUTRIN XL) 300 MG extended release tablet Take 1 tablet by mouth every morning 90 tablet 2    PARoxetine (PAXIL) 10 MG tablet Take 1 tablet by mouth daily 90 tablet 3    topiramate (TOPAMAX) 50 MG tablet TAKE 1 TABLET BY MOUTH EVERY NIGHT 90 tablet 2    meloxicam (MOBIC) 15 MG tablet Take 15 mg by mouth daily       No current facility-administered medications for this visit. PMH, Surgical Hx, Family Hx, and Social Hx reviewed and updated. Health Maintenance reviewed. Objective    Vitals:    09/01/22 1423   BP: 124/80   Pulse: 88   Weight: 178 lb (80.7 kg)   Height: 5' 3\" (1.6 m)       Physical Exam  Constitutional:       General: She is not in acute distress. Appearance: Normal appearance. She is well-developed. She is not ill-appearing, toxic-appearing or diaphoretic. HENT:      Head: Normocephalic and atraumatic. Right Ear: Tympanic membrane, ear canal and external ear normal.      Left Ear: Tympanic membrane, ear canal and external ear normal.      Nose: Nose normal.      Mouth/Throat:      Mouth: Mucous membranes are moist.   Eyes:      General: Lids are normal.      Extraocular Movements:      Right eye: No nystagmus. Left eye: No nystagmus. Conjunctiva/sclera: Conjunctivae normal.      Pupils: Pupils are equal, round, and reactive to light. Neck:      Thyroid: No thyroid mass or thyromegaly. Vascular: No carotid bruit or JVD. Trachea: Trachea normal. No tracheal deviation. Cardiovascular:      Rate and Rhythm: Normal rate and regular rhythm. Pulses: Normal pulses. Heart sounds: Normal heart sounds, S1 normal and S2 normal. No murmur heard. No gallop. Pulmonary:      Effort: Pulmonary effort is normal. No accessory muscle usage or respiratory distress. Breath sounds: Normal breath sounds. No decreased breath sounds, wheezing, rhonchi or rales. Abdominal:      General: Bowel sounds are normal. There is no distension. Palpations: Abdomen is soft. There is no hepatomegaly, splenomegaly or mass. Tenderness: There is no abdominal tenderness. Hernia: No hernia is present. Musculoskeletal:         General: Normal range of motion. Cervical back: Normal range of motion and neck supple.    Lymphadenopathy:      Head:      Right side of head: No submandibular, tonsillar, preauricular or posterior auricular adenopathy. Left side of head: No submandibular, tonsillar, preauricular or posterior auricular adenopathy. Cervical: No cervical adenopathy. Skin:     General: Skin is warm and dry. Capillary Refill: Capillary refill takes less than 2 seconds. Coloration: Skin is not pale. Neurological:      General: No focal deficit present. Mental Status: She is alert and oriented to person, place, and time. GCS: GCS eye subscore is 4. GCS verbal subscore is 5. GCS motor subscore is 6. Cranial Nerves: No cranial nerve deficit. Sensory: No sensory deficit. Motor: No weakness, tremor, atrophy, abnormal muscle tone or seizure activity. Coordination: Coordination normal.      Gait: Gait normal.      Deep Tendon Reflexes: Reflexes are normal and symmetric. Reflexes normal.   Psychiatric:         Mood and Affect: Mood normal.         Speech: Speech normal.         Behavior: Behavior normal. Behavior is cooperative. No evidence of parenchymal hemorrhages or contusions. No evidence of intra or extra-axial fluid collection is seen. Ventricles and sulci normal in size and configuration. No extra-axial collection. No cerebral edema or mass effect. Scattered areas of low attenuation visualized in the periventricular and subcortical white matter suggestive of chronic microvascular disease. Visualized paranasal sinuses are well aerated. Visualized mastoid air cells are well aerated. Right occipital scalp subcutaneous soft tissue swelling is seen. The calvarium is intact. Impression   No acute intracranial hemorrhage or mass effect. Right occipital scalp subcutaneous soft tissue swelling is seen.       8/8/2022  8:06 AM Michele, Chpo Incoming Radiant Results From [employer] Brii Navarro, APRN - CNP Results     No radiation information found for this patient  Narrative EXAMINATION: CT of the brain without contrast       HISTORY: Traumatic injury of head. Laceration of scalp. COMPARISON: Brain CT July 25, 2022       TECHNIQUE: Multiple contiguous axial images were obtained of the brain from the skull base through the vertex. Multiplanar reformats were obtained. FINDINGS:       Prominence of the sulci and ventricles compatible with mild generalized parenchymal volume loss. Areas of bilateral supratentorial white matter hypoattenuation are nonspecific but most likely related to chronic small vessel ischemic changes in a patient    of this age. Gray-white matter differentiation is preserved. No acute hemorrhage or abnormal extra-axial fluid collection. No mass effect or midline shift. The visualized paranasal sinuses and mastoid air cells are clear. Posterior scalp edema. Calvarium is intact. Impression       No acute intracranial process. All CT scans at this facility use dose modulation, iterative reconstruction, and/or weight based dosing when appropriate to reduce radiation dose to as low as reasonably achievable. Assessment & Plan   Pj Jane was seen today for head injury. Diagnoses and all orders for this visit:    Post concussion syndrome  -     External Referral - Christiano Galvin MD - Non-Operative Orthopedics, Post-Concussion Synd, Sports Med  -     traMADol (ULTRAM) 50 MG tablet; Take 1 tablet by mouth every 6 hours as needed for Pain for up to 3 days. Intended supply: 3 days. Take lowest dose possible to manage pain    Intractable post-traumatic headache, unspecified chronicity pattern  -     traMADol (ULTRAM) 50 MG tablet; Take 1 tablet by mouth every 6 hours as needed for Pain for up to 3 days. Intended supply: 3 days. Take lowest dose possible to manage pain  -     ketorolac (TORADOL) injection 30 mg    Dizziness  -     traMADol (ULTRAM) 50 MG tablet;  Take 1 tablet by mouth every 6 hours as needed for Pain for up to 3

## 2022-09-13 ENCOUNTER — HOSPITAL ENCOUNTER (OUTPATIENT)
Dept: PHYSICAL THERAPY | Age: 72
Setting detail: THERAPIES SERIES
Discharge: HOME | End: 2022-09-13
Payer: MEDICARE

## 2022-09-13 PROCEDURE — 97162 PT EVAL MOD COMPLEX 30 MIN: CPT

## 2022-09-13 ASSESSMENT — PAIN SCALES - GENERAL: PAINLEVEL_OUTOF10: 0

## 2022-09-13 NOTE — PROGRESS NOTES
[facility] EVALUATION    [x] [address]    [] [facility]                  Physical Therapy: Initial Evaluation    Patient: Bob Gracia (72 y.o.     female)   Examination Date:   Plan of Care Certification Period: 2022 to 22  Progress Note Counter:    :  1950 ;    Confirmed: Yes MRN: [id number]  CSN: [id number]   Insurance: Payor: Shanita Cox / Plan: Antonio Hernandez / Product Type: *No Product type* /   Insurance ID: [id number] - (Medicare Managed) Secondary Insurance (if applicable):    Referring Physician: Nany Rod MD     PCP: Ulysses Hackney, APRN - CNP Visits to Date/Visits Approved:   (BMN, $20 copay)    No Show/Cancelled Appts: 0 / 0     Medical Diagnosis: Strain of muscle, fascia and tendon at neck level, initial encounter [S16. 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]    Treatment Diagnosis: impaired balance, impaired gait, decreased cervical AROM     PERTINENT MEDICAL HISTORY   Patient Assessed for Rehabilitation Services: Yes       Medical History: Chart Reviewed: Yes   Past Medical History:   Diagnosis Date    Sinus infection      Surgical History: No past surgical history on file.     Medications:   Current Outpatient Medications:     buPROPion (WELLBUTRIN XL) 300 MG extended release tablet, Take 1 tablet by mouth every morning, Disp: 90 tablet, Rfl: 2    PARoxetine (PAXIL) 10 MG tablet, Take 1 tablet by mouth daily, Disp: 90 tablet, Rfl: 3    topiramate (TOPAMAX) 50 MG tablet, TAKE 1 TABLET BY MOUTH EVERY NIGHT, Disp: 90 tablet, Rfl: 2    meloxicam (MOBIC) 15 MG tablet, Take 15 mg by mouth daily, Disp: , Rfl:   Allergies: Penicillins      SUBJECTIVE EXAMINATION     History obtained from[de-identified] Patient,           Subjective History: Onset Date: 07/25/22  Subjective: Patient reports fall (July 25, 2022) with getting out of pool causing her to hit her head. Patient went to ER and had staples placed in her head and did CT. Reports she has been getting headaches with neck pain and reports some vertigo symptoms. Additionally, patient has blurry vision and fatigue. Patient report vertigo comes and goes. Reports dizziness is lightheadness and imbalance. Denies any numbness or tingling. Denies any sudden weakness. Increased pain with bending neck. Reports rest helps the pain. Patient has no previous history of falls.   Additional Pertinent Hx (if applicable): migraine HA, hx of bronchitis, depression, anxiety   Prior diagnostic testing[de-identified] CT Scan  Previous treatments prior to current episode?: Medications    Auditory History:   Do you have trouble hearing?: No  Is there pressure/fullness in the ear?: Yes  Is there pain in the ear?: No    Vision History:   Do you wear glasses/contacts: Yes  Double vision: No    Prior diagnostic testing[de-identified] CT Scan  Previous treatments prior to current episode?: Medications      Learning/Language: Learning  Does the patient/guardian have any barriers to learning?: No barriers  What is the preferred language of the patient/guardian?: English  How does the patient/guardian prefer to learn new concepts?: Listening, Reading, Demonstration, Pictures/Videos     Pain Screening    Pain Screening  Patient Currently in Pain: Yes  Pain Assessment: 0-10  Pain Level: 0  Worst Pain Level: 9 (neck with movement)    Functional Status    Social History:    Social History  Lives With:  (brother)  Type of Home: House  Home Layout: One level  Home Access: Stairs to enter with rails  Entrance Stairs - Number of Steps: 2    Occupation/Interests:   Occupation: Retired    Prior Level of Function:     Independent        Current Level of Function:   Independent ADL Assistance: Independent  Ambulation Assistance: Independent  Transfer Assistance: Independent         OBJECTIVE EXAMINATION     Restrictions:          No restriction    Review of Systems:  Vision: Impaired  Visual Deficits: Wears glasses (blurry)  Hearing: Within functional limits  Follows Commands: Within Functional Limits    Observations:   General Observations  General Observations: Yes  Posture:  Other (comment) (rounded shoulder, forward flexed posture)    Palpation:   Cervical Spine Palpation: increased tightness in cervical paraspinals and upper traps    Mobility:         Transfers  Sit to Stand: Supervision, Modified independent  Stand to sit: Modified independent, Supervision  Comment: get dizziness sometimes with sit to stand  Ambulation  Device: No Device  Assistance: Supervision, Stand by assistance  Quality of Gait: imbalance while ambulating to the left but able to recover without assist  Gait Deviations: Slow Radha, Decreased step length, Decreased step height, Deviated path  Distance: clinical distance in department      Balance Screen:   Balance  Sitting - Static: Good  Sitting - Dynamic: Good  Standing - Static: Fair  Standing - Dynamic: Fair, -  Verma Balance Score: 26    Neuro Screen: Sensation  Overall Sensation Status: WFL      Left Strength  Right Strength         Strength LUE  L Shoulder Flexion: 4/5  L Shoulder Extension: 4+/5  L Shoulder ABduction: 4/5  L Elbow Flexion: 4+/5  L Elbow Extension: 4+/5  L Wrist Flexion: 4+/5  L Wrist Extension: 4+/5    Strength RUE  R Shoulder Flexion: 4/5  R Shoulder Extension: 4+/5  R Shoulder ABduction: 4/5  R Elbow Flexion: 4+/5  R Elbow Extension: 4+/5  R Wrist Flexion: 4+/5  R Wrist Extension: 4+/5     Cervical Assessment     AROM Cervical Spine   Cervical spine general AROM: flexion 25 deg, ext 7 deg, right SB 18 deg, left SB 22 deg, right rotation 18 deg, left rotation 15 deg             Vision/Vestibular Assessment    Visual/Oculomotor  Visual/Occulomotor Tests  Visual/Occulomotor Assessed: Yes  Smooth pursuits horizontal: Requires cues, head turbs, or add eye shifts to track  Smooth pursuits vertical: Requires cues, head turns, or add eye shifts to track  Saccades horizontal:  Within Defined Limits    VOR Tests  VOR Tests  Horizontal Option: Intact    Positional Tests   Intensity (0-5) Nystagmus Duration   Sit -> Rt Sidelying 0 neg    Rt Sidelying -> Sit 0 neg    Sit -> Lt Sidelying 0 neg    Lt Sidelying -> Sit 0 neg         Outcomes Score:   Verma balance: 26/56    Treatment:    Exercises:   Exercises  Exercise 1: cervical AROM exercises given for HEP  Exercise 2: upper trap stretch, levator scap stretch*  Exercise 3: posture ex*  Exercise 4: smooth pursuits*  Exercise 5: saccades, VOR*  Exercise 6: static standing balance*  Exercise 7: ambulation training*  Exercise 20: HEP: cervical AROM  Modalities:     Manual:  Manual Therapy  Soft Tissue Mobilizaton: cervical*  *Indicates exercise,modality, or manual techniques to be initiated when appropriate       ASSESSMENT     Impression: Assessment: Patient s/p concussion has symptoms that include dizziness, imbalance, and neck pain. Upon PT evaluation, paitent demonstrates limited cervical ROM with increased tightness in cervical paraspinals. Additionally, patient had difficulty performing smooth pursuits this date and demonstrates imbalance scoring 26/56 on Verma. Further PT recommended to improved cervical pain, balance, and gait for overall quality of life.     Body Structures, Functions, Activity Limitations Requiring Skilled Therapeutic Intervention: Decreased ROM, Decreased strength, Decreased endurance, Increased pain, Decreased posture, Decreased balance, Decreased functional mobility     Statement of Medical Necessity: Physical Therapy is both indicated and medically necessary as outlined in the POC to increase the likelihood of meeting the functionally related goals stated below. Patient's Activity Tolerance: Patient tolerated evaluation without incident      Patient's rehabilitation potential/prognosis is considered to be: Good    Factors which may impact rehabilitation potential include: Age, Past PT / Medical Experience, Chronicity of problem, Pain tolerance/management     Patient Education: Goals, PT Role, Plan of Care, Home Exercise Program      GOALS   Patient Goal(s):    Short Term Goals Completed by 4 weeks Goal Status   Patient will report 25% reduction in dizziness symptoms. New   Patient will ambulate 150ft independently with improved bilateral step length and symmetry. New   Patient will report </= 1/10 pain in neck with movement for improved functional tolerance. New   Patient will demonstrates smooth pursuits WFLs for improved tracking. New     Long Term Goals Completed by 6-8 weeks Goal Status   Patient will increase cervical AROM >/= 10-15 degrees each direction for improved functional tolerance. New   Patient will increase strength in bilateral UEs to 5/5 for improved lifting tolerance. New   Verma balance >/= 50/56 to demontrate improved balance and reduce risk for falls.  New     TREATMENT PLAN       Requires PT Follow-Up: Yes    Treatment may include any combination of the following: Strengthening, ROM, Endurance training, Functional mobility training, Gait training, Neuromuscular re-education, Manual Therapy - Soft Tissue Mobilization, Manual Therapy - Joint Manipulation, Pain management, Home exercise program, Patient/Caregiver education & training, Modalities, Vestibular rehab     Frequency / Duration:  Patient to be seen 2 times per week for 6-8 weeks  Plan Comment:               Eval Complexity:   Decision Making: Medium Complexity  History: Personal Factors and/or Comorbidities Impacting POC: Medium  History: migraines, head injury, hx of bronchitis  Examination of body system(s) including body structures and functions, activity limitations, and/or participation restrictions: Medium  Exam: Verma balance: 26/56  Clinical Presentation: Medium  Clinical Presentation: evolving  Clinical Decision Making : Medium Complexity    POST-PAIN     Pain Rating (0-10 pain scale):   9/10  Location and pain description same as pre-treatment unless indicated. Action: [] NA  [] Call Physician  [x] Perform HEP  [x] Meds as prescribed    Evaluation and patient rights have been reviewed and patient agrees with plan of care. Yes  [x]  No  []   Explain:     [place] Fall Risk Assessment  Risk Factor Scale  Score   History of Falls [] Yes  [x] No 25  0 0   Secondary Diagnosis [] Yes  [x] No 15  0 0   Ambulatory Aid [] Furniture  [] Crutches/cane/walker  [x] None/bedrest/wheelchair/nurse 30  15  0 0   IV/Heparin Lock [] Yes  [x] No 20  0 0   Gait/Transferring [x] Impaired  [] Weak  [] Normal/bedrest/immobile 20  10  0 20   Mental Status [] Forgets limitations  [x] Oriented to own ability 15  0 0      Total:20     Based on the Assessment score: check the appropriate box.   [x]  No intervention needed   Low =   Score of 0-24  []  Use standard prevention interventions Moderate =  Score of 24-44   [] Discuss fall prevention strategies   [] Indicate moderate falls risk on eval  []  Use high risk prevention interventions High = Score of 45 and higher   [] Discuss fall prevention strategies   [] Provide supervision during treatment time      Minutes:  PT Individual Minutes  Time In: 1348  Time Out: [address]  Minutes: 57  Timed Code Treatment Minutes: 0 Minutes  Procedure Minutes:57 PT evaluation minutes     Timed Activity Minutes Units   Ther Ex 0    Manual  0        Electronically signed by Ulises Navas PT on 9/13/22 at 4:19 PM EDT

## 2022-09-13 NOTE — PLAN OF CARE
Chintan villegas, [address]     Ph: [phone]  Fax: [phone]      [x] Certification  [] Recertification [x]  Plan of Care  [] Progress Note [] Discharge      Referring Provider: Clarisa Bdeolla MD     From:  Marleni Costello, ALECIA  Patient: Marsha Mcdermott (79 y.o. female) : 1950 Date: 2022  Medical Diagnosis: Strain of muscle, fascia and tendon at neck level, initial encounter [S16. 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 Patient reports dizziness with change in positions. New   Short term goal 2: Patient will ambulate 150ft independently with improved bilateral step length and symmetry. Ambulation  Device: No Device  Assistance: Supervision, Stand by assistance  Quality of Gait: imbalance while ambulating to the left but able to recover without assist  Gait Deviations: Slow Radha, Decreased step length, Decreased step height, Deviated path  Distance: clinical distance in department    New   Short term goal 3: Patient will report </= 1/10 pain in neck with movement for improved functional tolerance. Patient reports pain can range from 0-9/10 in neck. New   Short term goal 4: Patient will demonstrates smooth pursuits WFLs for improved tracking.   Patient has difficulty with smooth pursuits New     Long Term Goals - Time Frame for Long term goals : 6-8 weeks  Goals Current/ Discharge status Status   Long term goal 1: Patient will increase cervical AROM >/= 10-15 degrees each direction for improved functional tolerance. AROM Cervical Spine   Cervical spine general AROM: flexion 25 deg, ext 7 deg, right SB 18 deg, left SB 22 deg, right rotation 18 deg, left rotation 15 deg     New   Long term goal 2: Patient will increase strength in bilateral UEs to 5/5 for improved lifting tolerance. Strength RUE  R Shoulder Flexion: 4/5  R Shoulder Extension: 4+/5  R Shoulder ABduction: 4/5  R Elbow Flexion: 4+/5  R Elbow Extension: 4+/5  R Wrist Flexion: 4+/5  R Wrist Extension: 4+/5  Strength LUE  L Shoulder Flexion: 4/5  L Shoulder Extension: 4+/5  L Shoulder ABduction: 4/5  L Elbow Flexion: 4+/5  L Elbow Extension: 4+/5  L Wrist Flexion: 4+/5  L Wrist Extension: 4+/5      New   Long term goal 3: Verma balance >/= 50/56 to demontrate improved balance and reduce risk for falls. Outcomes Measures:  Verma Balance Score: 26      New     Body Structures, Functions, Activity Limitations Requiring Skilled Therapeutic Intervention: Decreased ROM, Decreased strength, Decreased endurance, Increased pain, Decreased posture, Decreased balance, Decreased functional mobility   Assessment: Patient s/p concussion has symptoms that include dizziness, imbalance, and neck pain. Upon PT evaluation, paitent demonstrates limited cervical ROM with increased tightness in cervical paraspinals. Additionally, patient had difficulty performing smooth pursuits this date and demonstrates imbalance scoring 26/56 on Verma. Further PT recommended to improved cervical pain, balance, and gait for overall quality of life.   Therapy Prognosis: Good      PT Education: Goals;PT Role;Plan of Care;Home Exercise Program    PLAN: [x] Evaluate and Treat  Frequency/Duration:  Plan Frequency: 2  Plan weeks: 6-8  Current Treatment Recommendations: Strengthening, ROM, Endurance training, Functional mobility training, Gait training, Neuromuscular re-education, Manual Therapy - Soft Tissue Mobilization, Manual Therapy - Joint Manipulation, Pain management, Home exercise program, Patient/Caregiver education & training, Modalities, Vestibular rehab     Precautions:                            Patient Status:[x] Continue/ Initiate plan of Care    [] Discharge PT. Recommend pt continue with HEP. [] Additional visits requested, Please re-certify for additional visits:    [] Hold         Signature: Electronically signed by Sherry Rodriguez PT on 9/13/22 at 4:22 PM EDT      If you have any questions or concerns, please don't hesitate to call. Thank you for your referral.    I have reviewed this plan of care and certify a need for medically necessary rehabilitation services.     Physician Signature:__________________________________________________________  Date:  Please sign and return

## 2022-09-19 ENCOUNTER — HOSPITAL ENCOUNTER (OUTPATIENT)
Dept: PHYSICAL THERAPY | Age: 72
Setting detail: THERAPIES SERIES
Discharge: HOME | End: 2022-09-19
Payer: MEDICARE

## 2022-09-19 PROCEDURE — 97140 MANUAL THERAPY 1/> REGIONS: CPT

## 2022-09-19 PROCEDURE — 97112 NEUROMUSCULAR REEDUCATION: CPT

## 2022-09-19 PROCEDURE — 97110 THERAPEUTIC EXERCISES: CPT

## 2022-09-19 ASSESSMENT — PAIN SCALES - GENERAL: PAINLEVEL_OUTOF10: 7

## 2022-09-19 ASSESSMENT — PAIN DESCRIPTION - LOCATION: LOCATION: NECK

## 2022-09-19 ASSESSMENT — PAIN DESCRIPTION - ORIENTATION: ORIENTATION: RIGHT;LEFT

## 2022-09-19 ASSESSMENT — PAIN DESCRIPTION - DESCRIPTORS: DESCRIPTORS: ACHING

## 2022-09-19 NOTE — PROGRESS NOTES
[facility]  Outpatient Physical Therapy    Treatment Note        Date: 2022  Patient: Clayton Lesch  : 1950   Confirmed: Yes  MRN: [id number]  Referring Provider: Laurent Melendez MD    Medical Diagnosis: Strain of muscle, fascia and tendon at neck level, initial encounter [S16. 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]       Treatment Diagnosis: impaired balance, impaired gait, decreased cervical AROM    Visit Information:  Insurance: Payor: [facility] MEDICARE / Plan: Catalina April / Product Type: *No Product type* /   PT Visit Information  Onset Date: 22  PT Insurance Information: [facility] Medicare  Total # of Visits Approved:  (BMN, $20 copay)  Total # of Visits to Date: 2  Plan of Care/Certification Expiration Date: 22  No Show: 0  Progress Note Due Date: 10/13/22  Canceled Appointment: 0  Progress Note Counter: -    Subjective Information:  Subjective: Pt reports increased pain with rotation to Rt. Denies dizziness, though states \"my balance is not good. \"  HEP Compliance:  [x] Good [] Fair [] Poor [] Reports not doing due to:    Pain Screening  Patient Currently in Pain: Yes  Pain Assessment: 0-10  Pain Level: 7  Pain Location: Neck  Pain Orientation: Right, Left  Pain Descriptors: Aching    Treatment:  Exercises:  Exercises  Exercise 1: cervical AROM 5 sec x 10 ea  Exercise 2: upper trap stretch with overpressure to Lt only, levator scap stretch without overpressure 20 sec x 3 ea  Exercise 3: posture ex x10 ea  Exercise 4: smooth pursuits: horizontal 15 sec x 2 with increased difficulty to Rt with report of 3/5 dizziness, vertical 15 sec x 2 with 2/5 dizziness  Exercise 20: HEP: cervical stretches, posture ex     Manual:   Manual Therapy  Soft Tissue Mobilizaton: STM to cervical mm R > L x10 min       Modalities:  Moist Heat (CPT 64781)  Patient Position: Seated  Number Minutes Moist Heat: 10  Moist heat location: Cervical  Post treatment skin assessment: Redness - no adverse reaction       *Indicates exercise, modality, or manual techniques to be initiated when appropriate    Assessment: Body Structures, Functions, Activity Limitations Requiring Skilled Therapeutic Intervention: Decreased ROM, Decreased strength, Decreased endurance, Increased pain, Decreased posture, Decreased balance, Decreased functional mobility   Assessment: Initiated tx per PT POC with fair tolerance. Max cues to decrease guarding with ROM exercises and stretching. Tolerated smooth pursuits slowly with limited ROM. Pt reports mild relief of cervical mm tension with STM. States increased shooting pain in top of head post tx. Instructed pt to monitor symptoms and adjust activities to tolerance. Treatment Diagnosis: impaired balance, impaired gait, decreased cervical AROM  Therapy Prognosis: Good          Post-Pain Assessment:       Pain Rating (0-10 pain scale):   6/10 , crown of head  Location and pain description same as pre-treatment unless indicated. Action: [] NA   [x] Perform HEP  [] Meds as prescribed  [] Modalities as prescribed   [] Call Physician     GOALS   Patient Goal(s): Patient goals : \"not to have headaches and dizziness\"    Short Term Goals Completed by 4 weeks Goal Status   STG 1 Patient will report 25% reduction in dizziness symptoms. In progress   STG 2 Patient will ambulate 150ft independently with improved bilateral step length and symmetry. In progress   STG 3 Patient will report </= 1/10 pain in neck with movement for improved functional tolerance. In progress   STG 4 Patient will demonstrates smooth pursuits WFLs for improved tracking.  In progress       Long Term Goals Completed by 6-8 weeks Goal Status   LTG 1 Patient will increase cervical AROM >/= 10-15 degrees each direction for improved functional tolerance. In progress   LTG 2 Patient will increase strength in bilateral UEs to 5/5 for improved lifting tolerance. In progress   LTG 3 Verma balance >/= 50/56 to demontrate improved balance and reduce risk for falls. In progress            Plan:  Frequency/Duration:  Plan  Plan Frequency: 2  Plan weeks: 6-8  Current Treatment Recommendations: Strengthening, ROM, Endurance training, Functional mobility training, Gait training, Neuromuscular re-education, Manual Therapy - Soft Tissue Mobilization, Manual Therapy - Joint Manipulation, Pain management, Home exercise program, Patient/Caregiver education & training, Modalities, Vestibular rehab  Pt to continue current HEP. See objective section for any therapeutic exercise changes, additions or modifications this date.     Therapy Time:      PT Individual Minutes  Time In: 6518  Time Out: 9301  Minutes: 54  Timed Code Treatment Minutes: 44 Minutes  Procedure Minutes: 0    Timed Activity Minutes Units   Ther Ex 24 1   Manual  10 1   Neuro 10 1     Electronically signed by Lady Angel PTA on 9/19/22 at 9:07 AM EDT

## 2022-09-22 ENCOUNTER — HOSPITAL ENCOUNTER (OUTPATIENT)
Dept: PHYSICAL THERAPY | Age: 72
Setting detail: THERAPIES SERIES
Discharge: HOME | End: 2022-09-22
Payer: MEDICARE

## 2022-09-22 PROCEDURE — 97110 THERAPEUTIC EXERCISES: CPT

## 2022-09-22 PROCEDURE — 97140 MANUAL THERAPY 1/> REGIONS: CPT

## 2022-09-22 ASSESSMENT — PAIN SCALES - GENERAL: PAINLEVEL_OUTOF10: 4

## 2022-09-22 ASSESSMENT — PAIN DESCRIPTION - LOCATION: LOCATION: HEAD;NECK

## 2022-09-22 ASSESSMENT — PAIN DESCRIPTION - DESCRIPTORS: DESCRIPTORS: ACHING

## 2022-09-22 ASSESSMENT — PAIN DESCRIPTION - ORIENTATION: ORIENTATION: RIGHT;LEFT
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
## 2022-09-26 ENCOUNTER — HOSPITAL ENCOUNTER (OUTPATIENT)
Dept: PHYSICAL THERAPY | Age: 72
Setting detail: THERAPIES SERIES
Discharge: HOME | End: 2022-09-26
Payer: MEDICARE

## 2022-09-26 PROCEDURE — 97112 NEUROMUSCULAR REEDUCATION: CPT

## 2022-09-26 PROCEDURE — 97140 MANUAL THERAPY 1/> REGIONS: CPT

## 2022-09-26 NOTE — PROGRESS NOTES
[facility]  Outpatient Physical Therapy    Treatment Note        Date: 2022  Patient: Bob Gracia  : 1950   Confirmed: Yes  MRN: [id number]  Referring Provider: Nany Rod MD    Medical Diagnosis: Strain of muscle, fascia and tendon at neck level, initial encounter [S16. 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]       Treatment Diagnosis: impaired balance, impaired gait, decreased cervical AROM    Visit Information:  Insurance: Payor: [facility] MEDICARE / Plan: Antonio Hernandez / Product Type: *No Product type* /   PT Visit Information  Onset Date: 22  PT Insurance Information: [facility] Medicare  Total # of Visits Approved:  (BMN, $20 copay)  Total # of Visits to Date: 4  Plan of Care/Certification Expiration Date: 22  No Show: 0  Progress Note Due Date: 10/13/22  Canceled Appointment: 0  Progress Note Counter: -    Subjective Information:  Subjective: Patient reports changing phones from Iphone to Android, had difficulty concentrating on directions that daughter was giving her. Was able to do yard work over the weekend, occ dizziness. Siri Amezcua Dr who specializes in Concussions on 22.   HEP Compliance:  [x] Good [] Fair [] Poor [] Reports not doing due to:    Pain Screening  Patient Currently in Pain: No    Treatment:  Exercises:  Exercises  Exercise 2: upper trap stretch with overpressure to Lt only, levator scap stretch without overpressure 20 sec x 3 ea  Exercise 3: posture ex x10 ea  Exercise 4: smooth pursuits: horizontal 15-18sec sec x 3 with increased difficulty to Lt with report of 3/5 dizziness (occ corrective saccades), vertical 15 sec x 2 with 3/5 dizziness (inconsistent tracking with vertical)  Exercise 5: VOR x1 (15sec x1 Horizontal, vertical 15sec x1) with decreased tolerance  Exercise 6: Static standing balance: Feet together, feet apart 30sec x2, modified tandem up to 15''  Exercise 8: Pec stretch to improve posture 3/20sec (doorway)  Exercise 9: Thumb push ups x10 (significant distance from nose)       Manual:   Manual Therapy  Soft Tissue Mobilizaton: STM to cervical mm R > L, TPR Mid trap  x10 min total       Modalities:  Moist Heat (CPT 25396)  Patient Position: Supine  Number Minutes Moist Heat: 10  Moist heat location: Cervical  Moist heat specified location: In dark room  Post treatment skin assessment: Redness - no adverse reaction       *Indicates exercise, modality, or manual techniques to be initiated when appropriate                Assessment: Body Structures, Functions, Activity Limitations Requiring Skilled Therapeutic Intervention: Decreased ROM, Decreased strength, Decreased endurance, Increased pain, Decreased posture, Decreased balance, Decreased functional mobility   Assessment: Trialed increasing duration of smooth pursuits, patient with increased corrective saccades at 18sec. Completed half of treatment session in quiet room as patient reporting increased symptoms in busy, loud environment. 2 episodes of dizziness after standing up, education on standing and counting 10 sec before ambulating. Increased tenderness Rt lower trap causing increased shooting pain to head. Will continue to treat in private treatment room to decreased symptoms and improved tolerance. Treatment Diagnosis: impaired balance, impaired gait, decreased cervical AROM  Therapy Prognosis: Good          Post-Pain Assessment:       Pain Rating (0-10 pain scale):   0/10   Location and pain description same as pre-treatment unless indicated.    Action: [x] NA   [] Perform HEP  [] Meds as prescribed  [] Modalities as prescribed   [] Call Physician     GOALS   Patient Goal(s): Patient goals : \"not to have headaches and dizziness\"    Short Term Goals Completed by 4 weeks Goal Status   STG 1 Patient will report 25% reduction in dizziness symptoms. In progress   STG 2 Patient will ambulate 150ft independently with improved bilateral step length and symmetry. In progress   STG 3 Patient will report </= 1/10 pain in neck with movement for improved functional tolerance. In progress   STG 4 Patient will demonstrates smooth pursuits WFLs for improved tracking. In progress     Long Term Goals Completed by 6-8 weeks Goal Status   LTG 1 Patient will increase cervical AROM >/= 10-15 degrees each direction for improved functional tolerance. In progress   LTG 2 Patient will increase strength in bilateral UEs to 5/5 for improved lifting tolerance. In progress   LTG 3 Verma balance >/= 50/56 to demontrate improved balance and reduce risk for falls. In progress     Plan:  Frequency/Duration:  Plan  Plan Frequency: 2  Plan weeks: 6-8  Current Treatment Recommendations: Strengthening, ROM, Endurance training, Functional mobility training, Gait training, Neuromuscular re-education, Manual Therapy - Soft Tissue Mobilization, Manual Therapy - Joint Manipulation, Pain management, Home exercise program, Patient/Caregiver education & training, Modalities, Vestibular rehab  Pt to continue current HEP. See objective section for any therapeutic exercise changes, additions or modifications this date.     Therapy Time:      PT Individual Minutes  Time In: 8355  Time Out: 4647  Minutes: 64  Timed Code Treatment Minutes: 54 Minutes  Procedure Minutes:10  Timed Activity Minutes Units   Neuro 44 3   Manual  10 1     Electronically signed by Juanita Etienne PTA on 9/26/22 at 7:56 AM EDT

## 2022-09-29 ENCOUNTER — HOSPITAL ENCOUNTER (OUTPATIENT)
Dept: PHYSICAL THERAPY | Age: 72
Setting detail: THERAPIES SERIES
Discharge: HOME | End: 2022-09-29
Payer: MEDICARE

## 2022-09-29 PROCEDURE — 97112 NEUROMUSCULAR REEDUCATION: CPT

## 2022-09-29 NOTE — PROGRESS NOTES
[facility]  Outpatient Physical Therapy    Treatment Note        Date: 2022  Patient: Jovi Padilla  : 1950   Confirmed: Yes  MRN: [id number]  Referring Provider: Jose Elias Marroquin MD    Medical Diagnosis: Strain of muscle, fascia and tendon at neck level, initial encounter [S16. 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]       Treatment Diagnosis: impaired balance, impaired gait, decreased cervical AROM    Visit Information:  Insurance: Payor: [facility] MEDICARE / Plan: Jennifer Ramóndarien / Product Type: *No Product type* /   PT Visit Information  Onset Date: 22  PT Insurance Information: [facility] Medicare  Total # of Visits Approved:  (BMN, $20 copay)  Total # of Visits to Date: 5  Plan of Care/Certification Expiration Date: 22  No Show: 0  Progress Note Due Date: 10/13/22  Canceled Appointment: 0  Progress Note Counter: -    Subjective Information:  Subjective: Pt states she tried 1 activity at a time.  No pain just discomfort  HEP Compliance:  [x] Good [] Fair [] Poor [] Reports not doing due to:    Pain Screening  Patient Currently in Pain: No    Treatment:  Exercises:  Exercises  Exercise 2: upper trap stretch with overpressure to Lt only, levator scap stretch without overpressure 20 sec x 3 ea  Exercise 3: posture ex x10 ea  Exercise 4: smooth pursuits: horizontal 15-18sec sec x 3 with increased difficulty to Rt with report of 3/5 dizziness (occ corrective saccades), vertical 15 sec x 1 x 2 with 3/5 dizziness (inconsistent tracking with vertical)Saccades Horiz 20-30s x 2  Exercise 5: VOR x1 (15sec x1 Horizontal, vertical 15sec x1) with decreased tolerance  Exercise 8: Pec stretch to improve posture 3/20sec corner       Manual:   Manual Therapy  Soft Tissue Mobilizaton: STM to cervical mm R > L, TPR , suboccipital release with very gentle distraction  x9 min total       *Indicates exercise, modality, or manual techniques to be initiated when appropriate    ROM: [] NT  [x] ROM measurements:      AROM Cervical Spine   Cervical spine general AROM: Rt rot 34deg inc'd discomfort, Lt rot 34 deg inc'd tightness      Assessment: Body Structures, Functions, Activity Limitations Requiring Skilled Therapeutic Intervention: Decreased ROM, Decreased strength, Decreased endurance, Increased pain, Decreased posture, Decreased balance, Decreased functional mobility   Assessment: Treatment performed in private tx room as preiviously recomended by Therapist PAUL. Pt tolerated session better with decreased distraction. Pt with abnormal smooth pursuits with corrective saccaeds and slow speed/focus. Pt with decreased balance when attempting VOR in standing vs. seated. Pt with c/o increased dizziness  after manual requiring several minutes to recover before. standing Pt not tolerating occulomootor ex's for 30s, 20secs or less. dizziness 4/5 upon leaving. Treatment Diagnosis: impaired balance, impaired gait, decreased cervical AROM  Therapy Prognosis: Good          Post-Pain Assessment:       Pain Rating (0-10 pain scale):  0 /10   Location and pain description same as pre-treatment unless indicated. Action: [] NA   [] Perform HEP  [] Meds as prescribed  [] Modalities as prescribed   [] Call Physician     GOALS   Patient Goal(s): Patient goals : \"not to have headaches and dizziness\"    Short Term Goals Completed by 4 weeks Goal Status   STG 1 Patient will report 25% reduction in dizziness symptoms. In progress   STG 2 Patient will ambulate 150ft independently with improved bilateral step length and symmetry. In progress   STG 3 Patient will report </= 1/10 pain in neck with movement for improved functional tolerance.  In progress   STG 4 Patient will demonstrates smooth pursuits WFLs for improved tracking. In progress     Long Term Goals Completed by 6-8 weeks Goal Status   LTG 1 Patient will increase cervical AROM >/= 10-15 degrees each direction for improved functional tolerance. In progress   LTG 2 Patient will increase strength in bilateral UEs to 5/5 for improved lifting tolerance. In progress   LTG 3 Verma balance >/= 50/56 to demontrate improved balance and reduce risk for falls. In progress     Plan:  Frequency/Duration:  Plan  Plan Frequency: 2  Plan weeks: 6-8  Current Treatment Recommendations: Strengthening, ROM, Endurance training, Functional mobility training, Gait training, Neuromuscular re-education, Manual Therapy - Soft Tissue Mobilization, Manual Therapy - Joint Manipulation, Pain management, Home exercise program, Patient/Caregiver education & training, Modalities, Vestibular rehab  Pt to continue current HEP. See objective section for any therapeutic exercise changes, additions or modifications this date.     Therapy Time:      PT Individual Minutes  Time In: 2893  Time Out: 8017  Minutes: 57  Timed Code Treatment Minutes: 57 Minutes  Procedure Minutes:0  Timed Activity Minutes Units   Neuro 57 4     Electronically signed by Caridad Porter PTA on 9/29/22 at [address] [place] [address] EDT

## 2022-10-03 ENCOUNTER — HOSPITAL ENCOUNTER (OUTPATIENT)
Dept: PHYSICAL THERAPY | Age: 72
Setting detail: THERAPIES SERIES
Discharge: HOME | End: 2022-10-03
Payer: MEDICARE

## 2022-10-03 PROCEDURE — 97112 NEUROMUSCULAR REEDUCATION: CPT

## 2022-10-03 NOTE — PROGRESS NOTES
[facility]  Outpatient Physical Therapy    Treatment Note        Date: 10/3/2022  Patient: Mady Pino  : 1950   Confirmed: Yes  MRN: [id number]  Referring Provider: Imani Ricketts MD    Medical Diagnosis: Strain of muscle, fascia and tendon at neck level, initial encounter [S16. 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]       Treatment Diagnosis: impaired balance, impaired gait, decreased cervical AROM    Visit Information:  Insurance: Payor: [facility] MEDICARE / Plan: [facility] / Product Type: *No Product type* /   PT Visit Information  Onset Date: 22  PT Insurance Information: [facility] Medicare  Total # of Visits Approved:  (BMN, $20 copay)  Total # of Visits to Date: 6  Plan of Care/Certification Expiration Date: 22  No Show: 0  Progress Note Due Date: 10/13/22  Canceled Appointment: 0  Progress Note Counter: -    Subjective Information:  Subjective: Pt states she was mentally fatigued after LV.  Pt states going to a restuarant the noise and activity was too much and she had to leave  HEP Compliance:  [x] Good [] Fair [] Poor [] Reports not doing due to:    Pain Screening  Patient Currently in Pain: No    Treatment:  Exercises:  Exercises  Exercise 2: upper trap stretch with overpressure to Lt only, levator scap stretch without overpressure 20 sec x 3 ea  Exercise 4: smooth pursuits: horizontal 15-18sec sec x 1 x 30s x 1 with improved  focus with report of 3/5 dizziness (occ corrective saccades), vertical 15 sec x 1 x 2 with 3/5 dizziness (inconsistent tracking with vertical) decreased tolerance, Saccades Horiz 15-20s s x 2, Vert x 10s with inc'd sx's  Exercise 5: VOR x1 (15sec x1 Horizontal,) with decreased tolerance, seated  Exercise 7: ambulation training, 1 lap track with spotting, sx's with light change  Exercise 10: gait drills: march n hold, 0-1 ue supp, tandem(F/R) Inc'd dizziness     *Indicates exercise, modality, or manual techniques to be initiated when appropriate      Assessment: Body Structures, Functions, Activity Limitations Requiring Skilled Therapeutic Intervention: Decreased ROM, Decreased strength, Decreased endurance, Increased pain, Decreased posture, Decreased balance, Decreased functional mobility   Assessment: Neck pain comes and goes and pt states \" I can move my neck better than before\" Pt with increased sx's after occulomotor ex's and HA which increased after attempting balance drills in clinic and especially in foam stance in partial tandem. Trialed gait on track with fair tolerance until light sensitivty @ [address] facing windows and brigth light, needing a seated RB. Discussed with pt tolerance for 60 min sessions and pt having the clearance to states if she cannot continue a complete session. Treatment Diagnosis: impaired balance, impaired gait, decreased cervical AROM  Therapy Prognosis: Good          Post-Pain Assessment:       Pain Rating (0-10 pain scale):   3/10   Location and pain description same as pre-treatment unless indicated. Action: [] NA   [] Perform HEP  [x] Meds as prescribed  [] Modalities as prescribed   [] Call Physician     GOALS   Patient Goal(s): Patient Goals : \"not to have headaches and dizziness\"    Short Term Goals Completed by 4 weeks Goal Status   STG 1 Patient will report 25% reduction in dizziness symptoms. In progress   STG 2 Patient will ambulate 150ft independently with improved bilateral step length and symmetry. In progress   STG 3 Patient will report </= 1/10 pain in neck with movement for improved functional tolerance. In progress   STG 4 Patient will demonstrates smooth pursuits WFLs for improved tracking.  In progress       Long Term Goals Completed by 6-8 weeks Goal Status   LTG 1 Patient will increase cervical AROM >/= 10-15 degrees each direction for improved functional tolerance. In progress   LTG 2 Patient will increase strength in bilateral UEs to 5/5 for improved lifting tolerance. In progress   LTG 3 Verma balance >/= 50/56 to demontrate improved balance and reduce risk for falls. In progress       Plan:  Frequency/Duration:  Plan  Plan Frequency: 2  Plan weeks: 6-8  Current Treatment Recommendations: Strengthening, ROM, Endurance training, Functional mobility training, Gait training, Neuromuscular re-education, Manual Therapy - Soft Tissue Mobilization, Manual Therapy - Joint Manipulation, Pain management, Home exercise program, Patient/Caregiver education & training, Modalities, Vestibular rehab  Pt to continue current HEP. See objective section for any therapeutic exercise changes, additions or modifications this date.     Therapy Time:      PT Individual Minutes  Time In: 8249  Time Out: 3563  Minutes: 54  Timed Code Treatment Minutes: 54 Minutes  Procedure Minutes:0  Timed Activity Minutes Units   Neuro 54 4     Electronically signed by Pauline Hedrick PTA on 10/3/22 at 12:01 PM EDT

## 2022-10-05 ENCOUNTER — TELEPHONE (OUTPATIENT)
Dept: FAMILY MEDICINE CLINIC | Age: 72
End: 2022-10-05

## 2022-10-05 ENCOUNTER — TELEPHONE (OUTPATIENT)
Dept: GASTROENTEROLOGY | Age: 72
End: 2022-10-05

## 2022-10-05 DIAGNOSIS — Z12.31 ENCOUNTER FOR SCREENING MAMMOGRAM FOR MALIGNANT NEOPLASM OF BREAST: Primary | ICD-10-CM

## 2022-10-06 ENCOUNTER — HOSPITAL ENCOUNTER (OUTPATIENT)
Dept: PHYSICAL THERAPY | Age: 72
Setting detail: THERAPIES SERIES
Discharge: HOME | End: 2022-10-06
Payer: MEDICARE

## 2022-10-06 NOTE — PROGRESS NOTES
Therapy                            Cancellation/No-show Note    Date: 10/06/2022  Patient: Sandra Gowers (53 y.o. female)  : 1950  MRN:  [id number]  Referring Physician: Pedro Sam MD    Medical Diagnosis: Strain of muscle, fascia and tendon at neck level, initial encounter [S16. 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]      Visit Information:  Insurance: Payor: [facility] MEDICARE / Plan: Dannielle Hongign / Product Type: *No Product type* /   Visits to Date: 6   No Show/Cancelled Appts: 1  0      For today's appointment patient:  []  Cancelled  []  Rescheduled appointment  [x]  No-show   [x]  Called pt to remind of next appointment     Reason given by patient:  []  Patient ill  []  Conflicting appointment  []  No transportation    []  Conflict with work  []  No reason given  [x]  Other:  Pt states she thought appt was Friday    [x] Pt has future appointments scheduled, no follow up needed  [] Pt requests to be on hold.     Reason:   If > 2 weeks please discuss with therapist.  [] Therapist to call pt for follow up     Comments:       Signature: Electronically signed by Tata Andino PTA on 10/6/22 at 9:16 AM EDT

## 2022-10-10 ENCOUNTER — HOSPITAL ENCOUNTER (OUTPATIENT)
Dept: PHYSICAL THERAPY | Age: 72
Setting detail: THERAPIES SERIES
Discharge: HOME | End: 2022-10-10
Payer: MEDICARE

## 2022-10-10 PROCEDURE — 97112 NEUROMUSCULAR REEDUCATION: CPT

## 2022-10-10 NOTE — PROGRESS NOTES
[facility]  Outpatient Physical Therapy    Treatment Note        Date: 10/10/2022  Patient: Lg Ellington  : 1950   Confirmed: Yes  MRN: [id number]  Referring Provider: Kameron Montoya MD    Medical Diagnosis: Strain of muscle, fascia and tendon at neck level, initial encounter [S16. 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]       Treatment Diagnosis: impaired balance, impaired gait, decreased cervical AROM    Visit Information:  Insurance: Payor: [facility] MEDICARE / Plan: Nury Emerson / Product Type: *No Product type* /   PT Visit Information  Onset Date: 22  PT Insurance Information: [facility] Medicare  Total # of Visits Approved:  (BMN, $20 copay)  Total # of Visits to Date: 7  Plan of Care/Certification Expiration Date: 22  No Show: 1  Progress Note Due Date: 10/13/22  Canceled Appointment: 0  Progress Note Counter: -    Subjective Information:  Subjective: Patient reports increased dizziness today. \"I think it might be from the drainage from allergies\" Has appt with Neurologist on 10/30/22, seemela Murrell at the end of October as well.   HEP Compliance:  [x] Good [] Fair [] Poor [] Reports not doing due to:    Pain Screening  Patient Currently in Pain: No    Treatment:  Exercises:  Exercises  Exercise 4: smooth pursuits: horizontal 18-20sec sec x 1 x 30s x 1 with improved  focus with report of 4/5 dizziness (occ corrective saccades), vertical 15 sec x 1 x 2 with 4/5 dizziness (inconsistent tracking with vertical) decreased tolerance, Saccades Horiz 20-30s s x 2, Vert x 10s x2  with inc'd sx's  Exercise 6: Static standing balance: Feet together/ EO/EC, 1', modified tandem up to 30''  Exercise 9: Thumb push ups x10 (significant distance from nose)14-20 cm  Exercise 10: gait drills: march n hold, 0-1 ue supp, tandem(F) Inc'd dizziness     *Indicates exercise, modality, or manual techniques to be initiated when appropriate    Assessment: Body Structures, Functions, Activity Limitations Requiring Skilled Therapeutic Intervention: Decreased ROM, Decreased strength, Decreased endurance, Increased pain, Decreased posture, Decreased balance, Decreased functional mobility   Assessment: Pt with improved Verma score of 43 with a 17 pt improvement indicating improving static balance. Pt reports 50 % improvement  in overall dizziness which comes and goes. 3/5 dizziness after session with increased imbalance with dynamic activites. Treatment Diagnosis: impaired balance, impaired gait, decreased cervical AROM  Therapy Prognosis: Good          Post-Pain Assessment:       Pain Rating (0-10 pain scale):   0/10   Location and pain description same as pre-treatment unless indicated. Action: [x] NA   [] Perform HEP  [] Meds as prescribed  [] Modalities as prescribed   [] Call Physician     GOALS   Patient Goal(s): Patient Goals : \"not to have headaches and dizziness\"    Short Term Goals Completed by 4 weeks Goal Status   STG 1 Patient will report 25% reduction in dizziness symptoms. In progress   STG 2 Patient will ambulate 150ft independently with improved bilateral step length and symmetry. In progress   STG 3 Patient will report </= 1/10 pain in neck with movement for improved functional tolerance. In progress   STG 4 Patient will demonstrates smooth pursuits WFLs for improved tracking. In progress     Long Term Goals Completed by 6-8 weeks Goal Status   LTG 1 Patient will increase cervical AROM >/= 10-15 degrees each direction for improved functional tolerance. In progress   LTG 2 Patient will increase strength in bilateral UEs to 5/5 for improved lifting tolerance. In progress   LTG 3 Verma balance >/= 50/56 to demontrate improved balance and reduce risk for falls.  In progress Plan:  Frequency/Duration:  Plan  Plan Frequency: 2  Plan weeks: 6-8  Current Treatment Recommendations: Strengthening, ROM, Endurance training, Functional mobility training, Gait training, Neuromuscular re-education, Manual Therapy - Soft Tissue Mobilization, Manual Therapy - Joint Manipulation, Pain management, Home exercise program, Patient/Caregiver education & training, Modalities, Vestibular rehab  Pt to continue current HEP. See objective section for any therapeutic exercise changes, additions or modifications this date.     Therapy Time:      PT Individual Minutes  Time In: 9591  Time Out: 0831  Minutes: 53     Procedure Minutes:0  Timed Activity Minutes Units   Neuro 53 4     Electronically signed by Jenna Mane PTA on 10/10/22 at 9:04 AM EDT

## 2022-10-13 ENCOUNTER — HOSPITAL ENCOUNTER (OUTPATIENT)
Dept: PHYSICAL THERAPY | Age: 72
Setting detail: THERAPIES SERIES
Discharge: HOME | End: 2022-10-13
Payer: MEDICARE

## 2022-10-13 PROCEDURE — 97116 GAIT TRAINING THERAPY: CPT

## 2022-10-13 PROCEDURE — 97110 THERAPEUTIC EXERCISES: CPT

## 2022-10-13 PROCEDURE — 97112 NEUROMUSCULAR REEDUCATION: CPT

## 2022-10-13 NOTE — PROGRESS NOTES
[address]     Ph: [phone]  Fax: [phone]      [] Certification  [] Recertification []  Plan of Care  [x] Progress Note [] Discharge      Referring Provider: Jose Alberto Gallagher MD     From: Teachacorta Ever DPTPatient: Lissa Lindsey (67 y.o. female) : 1950 Date: 10/13/2022  Medical Diagnosis: Strain of muscle, fascia and tendon at neck level, initial encounter [S16. 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 Pt reports 50 % improvement Met   Short Term Goal 2: Patient will ambulate 150ft independently with improved bilateral step length and symmetry. Ambulation  Device: No Device  Assistance: Supervision, Modified Independent  Quality of Gait: occassional lateral excursion and impromptu startle with horiz head turns  Gait Deviations: Slow Radha, Decreased step length, Decreased step height, Deviated path  Distance: 2 laps track =528ft    Met   Short Term Goal 3: Patient will report </= 1/10 pain in neck with movement for improved functional tolerance. Pt reports 0-3-4/10 increased with lifting In progress, Partially met   Short Term Goal 4: Patient will demonstrates smooth pursuits WFLs for improved tracking.   Pt with improving tracking horizontally, decreased tolerance to vertical with corrective saccades In progress     Long Term Goals - Time Frame for Long Term Goals : 6-8 weeks  Goals Current/ Discharge status Status   Long Term Goal 1: Patient will increase cervical AROM >/= 10-15 degrees each direction for improved functional tolerance. ROM: [] NT  [x] ROM measurements:  AROM Cervical Spine   Cervical spine general AROM: flex=66*, ext 38*, R SB=32*, L SB=33*, R rot=32*, L rot= 45*      In progress, Partially met   Long Term Goal 2: Patient will increase strength in bilateral UEs to 5/5 for improved lifting tolerance. Strength LUE  L Shoulder Flexion: 4/5, 4+/5  L Shoulder Extension: 5/5  L Shoulder ABduction: 4+/5  L Elbow Flexion: 5/5  L Elbow Extension: 5/5  L Wrist Flexion: 5/5  L Wrist Extension: 5/5  Strength RUE  R Shoulder Flexion: 4+/5  R Shoulder Extension: 5/5  R Shoulder ABduction: 4+/5  R Elbow Flexion: 4+/5, 5/5  R Elbow Extension: 4+/5  R Wrist Flexion: 4+/5  R Wrist Extension: 4+/5            In progress   Long Term Goal 3: Verma balance >/= 50/56 to demontrate improved balance and reduce risk for falls. Verma = 43 In progress       Body Structures, Functions, Activity Limitations Requiring Skilled Therapeutic Intervention: Decreased ROM, Decreased strength, Decreased endurance, Increased pain, Decreased posture, Decreased balance, Decreased functional mobility   Assessment: Pt making improvements towards all goals and would benefit from continued therapy to address deficits to improve QOL with focus on occulomotor, balance and gait quality.   Therapy Prognosis: Good           PLAN: [x] Evaluate and Treat  Frequency/Duration:  Plan Frequency: 2  Plan weeks: 6-8  Current Treatment Recommendations: Strengthening, ROM, Endurance training, Functional mobility training, Gait training, Neuromuscular re-education, Manual Therapy - Soft Tissue Mobilization, Manual Therapy - Joint Manipulation, Pain management, Home exercise program, Patient/Caregiver education & training, Modalities, Vestibular rehab     Precautions:                            Patient Status:[x] Continue/ Initiate plan of Care    [] Discharge PT. Recommend pt continue with HEP. [] Additional visits requested, Please re-certify for additional visits:    [] Hold         Signature: Obj Info By:Electronically signed by Lu Sanchez PTA on 10/13/22 at 9:33 AM EDT  Electronically signed by Lulu Skiff, PT on 10/13/2022 at 4:16 PM        If you have any questions or concerns, please don't hesitate to call. Thank you for your referral.    I have reviewed this plan of care and certify a need for medically necessary rehabilitation services.     Physician Signature:__________________________________________________________  Date:  Please sign and return

## 2022-10-13 NOTE — PROGRESS NOTES
[facility]  Outpatient Physical Therapy    Treatment Note        Date: 10/13/2022  Patient: Rukhsana Moreno  : 1950   Confirmed: Yes  MRN: [id number]  Referring Provider: Brad Padilla MD    Medical Diagnosis: Strain of muscle, fascia and tendon at neck level, initial encounter [S16. 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]       Treatment Diagnosis: impaired balance, impaired gait, decreased cervical AROM    Visit Information:  Insurance: Payor: [facility] MEDICARE / Plan: Clare Tong / Product Type: *No Product type* /   PT Visit Information  Onset Date: 22  PT Insurance Information: [facility] Medicare  Total # of Visits Approved:  (BMN, $20 copay)  Total # of Visits to Date: 8  Plan of Care/Certification Expiration Date: 22  No Show: 1  Progress Note Due Date: 10/13/22  Canceled Appointment: 0  Progress Note Counter: -    Subjective Information:  Subjective: Pt states she is feeling pretty good with exception of stuffiness.  Dizziness comes and goes with activities  HEP Compliance:  [x] Good [] Fair [] Poor [] Reports not doing due to:    Pain Screening  Patient Currently in Pain: No    Treatment:  Exercises:  Exercises  Exercise 1: obj measures  Exercise 2: upper trap stretch with overpressure to Lt only, levator scap stretch without overpressure 20 sec x 3 ea  Exercise 4: smooth pursuits: horizontal  2 x 30s x 1 with improved  focus with report of 0/5 dizziness, vertical 10 sec x 1 x 2 with 2/5 dizziness (inconsistent tracking with vertical)  Exercise 5: VOR x1 (30sec x1 Horizontal4/5,Vertical 15s x 1,4/5 ,) with decreased tolerance, standing  Exercise 7: ambulation training, 2 lap track with spotting  Exercise 9: Thumb push ups x6 (significant distance from MIRB)93-82 cm  Exercise 10: gait drills: march n hold, 0-1 ue supp, tandem(F/R) cross crawls  Exercise 11: GTB shoulder flex/ abd  x 10 B, rows/lats x 10 ea       *Indicates exercise, modality, or manual techniques to be initiated when appropriate    Objective Measures:    Ambulation  Device: No Device  Assistance: Supervision, Modified Independent  Quality of Gait: occassional lateral excursion and impromptu startle with horiz head turns  Gait Deviations: Slow Radha, Decreased step length, Decreased step height, Deviated path  Distance: 2 laps track =528ft    Strength: [] NT  [x] MMT completed:        Strength RUE  R Shoulder Flexion: 4+/5  R Shoulder Extension: 5/5  R Shoulder ABduction: 4+/5  R Elbow Flexion: 4+/5, 5/5  R Elbow Extension: 4+/5  R Wrist Flexion: 4+/5  R Wrist Extension: 4+/5  Strength LUE  L Shoulder Flexion: 4/5, 4+/5  L Shoulder Extension: 5/5  L Shoulder ABduction: 4+/5  L Elbow Flexion: 5/5  L Elbow Extension: 5/5  L Wrist Flexion: 5/5  L Wrist Extension: 5/5       ROM: [] NT  [x] ROM measurements:      AROM Cervical Spine   Cervical spine general AROM: flex=66*, ext 38*, R SB=32*, L SB=33*, R rot=32*, L rot= 45*         Assessment: Body Structures, Functions, Activity Limitations Requiring Skilled Therapeutic Intervention: Decreased ROM, Decreased strength, Decreased endurance, Increased pain, Decreased posture, Decreased balance, Decreased functional mobility   Assessment: Pt making improvements towards all goals and would benefit from continued therapy to address deficits to improve QOL with focus on occulomotor, balance and gait quality. Treatment Diagnosis: impaired balance, impaired gait, decreased cervical AROM  Therapy Prognosis: Good          Post-Pain Assessment:       Pain Rating (0-10 pain scale):   0/10 , 3/5 dizziness  Location and pain description same as pre-treatment unless indicated.    Action: [] NA   [x] Perform HEP  [] Meds as prescribed  [] Modalities as prescribed [] Call Physician     GOALS   Patient Goal(s): Patient Goals : \"not to have headaches and dizziness\"    Short Term Goals Completed by 4 weeks Goal Status   STG 1 Patient will report 25% reduction in dizziness symptoms. Met   STG 2 Patient will ambulate 150ft independently with improved bilateral step length and symmetry. Met   STG 3 Patient will report </= 1/10 pain in neck with movement for improved functional tolerance. In progress, Partially met   STG 4 Patient will demonstrates smooth pursuits WFLs for improved tracking. In progress     Long Term Goals Completed by 6-8 weeks Goal Status   LTG 1 Patient will increase cervical AROM >/= 10-15 degrees each direction for improved functional tolerance. In progress, Partially met   LTG 2 Patient will increase strength in bilateral UEs to 5/5 for improved lifting tolerance. In progress   LTG 3 Verma balance >/= 50/56 to demontrate improved balance and reduce risk for falls. In progress          Plan:  Frequency/Duration:  Plan  Plan Frequency: 2  Plan weeks: 6-8  Current Treatment Recommendations: Strengthening, ROM, Endurance training, Functional mobility training, Gait training, Neuromuscular re-education, Manual Therapy - Soft Tissue Mobilization, Manual Therapy - Joint Manipulation, Pain management, Home exercise program, Patient/Caregiver education & training, Modalities, Vestibular rehab  Pt to continue current HEP. See objective section for any therapeutic exercise changes, additions or modifications this date.     Therapy Time:      PT Individual Minutes  Time In: 8396  Time Out: 1003  Minutes: 53     Procedure Minutes:0  Timed Activity Minutes Units   Ther Ex 28 2   Gait 10 1   Neuro 15 1     Electronically signed by Sal Ruiz PTA on 10/13/22 at 9:33 AM EDT

## 2022-10-17 ENCOUNTER — HOSPITAL ENCOUNTER (OUTPATIENT)
Dept: PHYSICAL THERAPY | Age: 72
Setting detail: THERAPIES SERIES
Discharge: HOME | End: 2022-10-17
Payer: MEDICARE

## 2022-10-17 PROCEDURE — 97112 NEUROMUSCULAR REEDUCATION: CPT

## 2022-10-17 NOTE — PROGRESS NOTES
[facility]  Outpatient Physical Therapy    Treatment Note        Date: 10/17/2022  Patient: Nacho Pacheco  : 1950   Confirmed: Yes  MRN: [id number]  Referring Provider: Gregorio Jaime MD    Medical Diagnosis: Strain of muscle, fascia and tendon at neck level, initial encounter [S16. 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]       Treatment Diagnosis: impaired balance, impaired gait, decreased cervical AROM    Visit Information:  Insurance: Payor: [facility] MEDICARE / Plan: Kyaw Meza / Product Type: *No Product type* /   PT Visit Information  Onset Date: 22  PT Insurance Information: [facility] Medicare  Total # of Visits Approved:  (BMN, $20 copay)  Total # of Visits to Date: 9  Plan of Care/Certification Expiration Date: 22  No Show: 1  Progress Note Due Date: 22  Canceled Appointment: 0  Progress Note Counter: -    Subjective Information:  Subjective: Patient repors having a head cold over the weekend. Feels loud noises and bright lights still are bothersome.   HEP Compliance:  [x] Good [] Fair [] Poor [] Reports not doing due to:    Pain Screening  Patient Currently in Pain: No    Treatment:  Exercises:  Exercises  Exercise 3: Scanning with ambulation  3/5 dizziness  Exercise 4: smooth pursuits: horizontal  2 x 30s x 1 with improved  focus with report of 0/5 dizziness, vertical 13 sec x 1 x 2 with 2/5 dizziness (inconsistent tracking with vertical)  Exercise 5: VOR x1 (~18sec x2 Horizontal 1/5,Vertical 25-30s x 2 3/5 ,) with decreased tolerance in standing  Exercise 6: Foam: static standing, SLS supported, posture exercises, feet together 30sec, alt toe taps  Exercise 7: ambulation training, 2 lap track with spotting  Exercise 9: Thumb push ups x6 (significant distance from nose) > 19 cm with increased dizziness (3/5 dizziness)  Exercise 10: Gait drills: march and holds, cross overs 0-1 UE support       *Indicates exercise, modality, or manual techniques to be initiated when appropriate               Assessment: Body Structures, Functions, Activity Limitations Requiring Skilled Therapeutic Intervention: Decreased ROM, Decreased strength, Decreased endurance, Increased pain, Decreased posture, Decreased balance, Decreased functional mobility   Assessment: Patient with improved tolerance with horizontal oculomotor exercises though increased with vertical movements. Difficulty tolerating VORx1 this date, tolaterated up to 18sec vs 30'' previous session. Initiated scanning for words with ambulation, patient reporting increase eye fatigue. Treatment Diagnosis: impaired balance, impaired gait, decreased cervical AROM  Therapy Prognosis: Good          Post-Pain Assessment:       Pain Rating (0-10 pain scale):   0/10   Location and pain description same as pre-treatment unless indicated. Action: [] NA   [] Perform HEP  [] Meds as prescribed  [] Modalities as prescribed   [] Call Physician     GOALS   Patient Goal(s): Patient Goals : \"not to have headaches and dizziness\"    Short Term Goals Completed by 4 weeks Goal Status   STG 1 Patient will report 25% reduction in dizziness symptoms. Met   STG 2 Patient will ambulate 150ft independently with improved bilateral step length and symmetry. Met   STG 3 Patient will report </= 1/10 pain in neck with movement for improved functional tolerance. In progress, Partially met   STG 4 Patient will demonstrates smooth pursuits WFLs for improved tracking. In progress     Long Term Goals Completed by 6-8 weeks Goal Status   LTG 1 Patient will increase cervical AROM >/= 10-15 degrees each direction for improved functional tolerance.  In progress, Partially met   LTG 2 Patient will increase strength in bilateral UEs to 5/5 for improved lifting tolerance. In progress   LTG 3 Verma balance >/= 50/56 to demontrate improved balance and reduce risk for falls. In progress     Plan:  Frequency/Duration:  Plan  Plan Frequency: 2  Plan weeks: 6-8  Current Treatment Recommendations: Strengthening, ROM, Endurance training, Functional mobility training, Gait training, Neuromuscular re-education, Manual Therapy - Soft Tissue Mobilization, Manual Therapy - Joint Manipulation, Pain management, Home exercise program, Patient/Caregiver education & training, Modalities, Vestibular rehab  Pt to continue current HEP. See objective section for any therapeutic exercise changes, additions or modifications this date.     Therapy Time:      PT Individual Minutes  Time In: 1941  Time Out: 8557  Minutes: 53  Timed Code Treatment Minutes: 53 Minutes  Procedure Minutes:0  Timed Activity Minutes Units   Neuro  53 4     Electronically signed by Bushra Casiano PTA on 10/17/22 at 9:54 AM EDT

## 2022-10-20 ENCOUNTER — HOSPITAL ENCOUNTER (OUTPATIENT)
Dept: PHYSICAL THERAPY | Age: 72
Setting detail: THERAPIES SERIES
Discharge: HOME | End: 2022-10-20
Payer: MEDICARE

## 2022-10-20 NOTE — PROGRESS NOTES
[address]       Physical Therapy  Cancellation/No-show Note  Patient Name:  Deon Henderson  :  1950   Date:  10/20/2022          Visit Information:  PT Visit Information  Onset Date: 22  PT Insurance Information: [facility] Medicare  Total # of Visits Approved:  (BMN, $20 copay)  Total # of Visits to Date: 9  Plan of Care/Certification Expiration Date: 22  No Show: 1  Progress Note Due Date: 22  Canceled Appointment: 0  Progress Note Counter: - (Cx'd by facility)    For today's appointment patient:  [x]  Cancelled  []  Rescheduled appointment  []  No-show   []  Called pt to remind of next appointment     Reason given by patient:  []  Patient ill  []  Conflicting appointment  []  No transportation    []  Conflict with work  []  No reason given  [x]  Other:  Cx'd by facility- no therapist available.       Comments:       Signature: Electronically signed by Mehdi Muhammad PTA on 10/20/22 at 8:31 AM EDT

## 2022-10-24 ENCOUNTER — HOSPITAL ENCOUNTER (OUTPATIENT)
Dept: PHYSICAL THERAPY | Age: 72
Setting detail: THERAPIES SERIES
Discharge: HOME | End: 2022-10-24
Payer: MEDICARE

## 2022-10-24 PROCEDURE — 97112 NEUROMUSCULAR REEDUCATION: CPT

## 2022-10-24 PROCEDURE — 97110 THERAPEUTIC EXERCISES: CPT

## 2022-10-24 ASSESSMENT — PAIN SCALES - GENERAL: PAINLEVEL_OUTOF10: 0

## 2022-10-24 NOTE — PROGRESS NOTES
[facility]  Outpatient Physical Therapy    Treatment Note        Date: 10/24/2022  Patient: Kong Almazan  : 1950   Confirmed: Yes  MRN: [id number]  Referring Provider: Norberto Navas MD    Medical Diagnosis: Strain of muscle, fascia and tendon at neck level, initial encounter [S16. 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]       Treatment Diagnosis: impaired balance, impaired gait, decreased cervical AROM    Visit Information:  Insurance: Payor: [facility] MEDICARE / Plan: [employer] / Product Type: *No Product type* /   PT Visit Information  Onset Date: 22  PT Insurance Information: [facility] Medicare  Total # of Visits Approved:  (BMN, $20 copay)  Total # of Visits to Date: 10  Plan of Care/Certification Expiration Date: 22  No Show: 1  Progress Note Due Date: 22  Canceled Appointment: 0  Progress Note Counter: 10/12-    Subjective Information:  Subjective: Patient reports pain after raking acorns this weekend. Reports, she needs to avoid repetitive head movements.   HEP Compliance:  [x] Good [] Fair [] Poor [] Reports not doing due to:    Pain Screening  Patient Currently in Pain: Yes  Pain Assessment: 0-10  Pain Level: 0    Treatment:  Exercises:  Exercises  Exercise 3: Scanning with ambulation  0/5 dizziness, reported  Exercise 4: smooth pursuits: horizontal  2 x 30s x 1 with improved  focus with report of 0/5 dizziness, vertical 15 sec x 1 x 2 with 2/5 dizziness (inconsistent tracking with vertical)  Exercise 5: VOR x 1 seated (horizontal, vertical) with 2/5 dizziness reported  Exercise 6: foam: static standing, head turns, NBOS, weightshifts  Exercise 7: ambulation training, 2 lap track with spotting  Exercise 9: Thumb push ups x6 (significant distance from improved tracking. In progress     Long Term Goals Completed by 6-8 weeks Goal Status   LTG 1 Patient will increase cervical AROM >/= 10-15 degrees each direction for improved functional tolerance. In progress, Partially met   LTG 2 Patient will increase strength in bilateral UEs to 5/5 for improved lifting tolerance. In progress   LTG 3 Verma balance >/= 50/56 to demontrate improved balance and reduce risk for falls. In progress       Plan:  Frequency/Duration:  Plan  Plan Frequency: 2  Plan weeks: 6-8  Current Treatment Recommendations: Strengthening, ROM, Endurance training, Functional mobility training, Gait training, Neuromuscular re-education, Manual Therapy - Soft Tissue Mobilization, Manual Therapy - Joint Manipulation, Pain management, Home exercise program, Patient/Caregiver education & training, Modalities, Vestibular rehab  Pt to continue current HEP. See objective section for any therapeutic exercise changes, additions or modifications this date.     Therapy Time:      PT Individual Minutes  Time In: 0902  Time Out: 1003  Minutes: 61  Timed Code Treatment Minutes: 51 Minutes  Procedure Minutes:10 minutes hot pack  Timed Activity Minutes Units   Ther Ex     Neuro re-ed 51 3     Electronically signed by Rosa Mcclure PT on 10/24/22 at 10:22 AM EDT

## 2022-10-27 ENCOUNTER — APPOINTMENT (OUTPATIENT)
Dept: PHYSICAL THERAPY | Age: 72
End: 2022-10-27
Payer: MEDICARE

## 2022-10-27 PROCEDURE — 97112 NEUROMUSCULAR REEDUCATION: CPT

## 2022-10-27 PROCEDURE — 97116 GAIT TRAINING THERAPY: CPT

## 2022-10-27 NOTE — PROGRESS NOTES
[facility] [place]  Outpatient Physical Therapy    Treatment Note        Date: 10/27/2022  Patient: Nacho Pacheco  : 1950   Confirmed: Yes  MRN: [id number]  Referring Provider: Gregorio Jaime MD    Medical Diagnosis: Strain of muscle, fascia and tendon at neck level, initial encounter [S16. 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]       Treatment Diagnosis: impaired balance, impaired gait, decreased cervical AROM    Visit Information:  Insurance: Payor: [facility] MEDICARE / Plan: [employer]aw Meza / Product Type: *No Product type* /   PT Visit Information  Onset Date: 22  PT Insurance Information: [facility] Medicare  Total # of Visits Approved:  (BMN, $20 copay)  Total # of Visits to Date: 6  Plan of Care/Certification Expiration Date: 22  No Show: 1  Progress Note Due Date: 22  Canceled Appointment: 0  Progress Note Counter: -    Subjective Information:  Subjective: Pt states when she went to the dentist her head was hurting, just from putting a temporary filling  HEP Compliance:  [x] Good [] Fair [] Poor [] Reports not doing due to:    Pain Screening  Patient Currently in Pain: Denies    Treatment:  Exercises:  Exercises  Exercise 3: Seated Scanning with colored beads  3/5 dizziness, reported, scanning with ambulation 4/5 with vertical  Exercise 5: VOR x 1 seated (horizontal, vertical) with 1/5 dizziness reported  Exercise 6: foam: FA with vert head turns  Exercise 7: Dual tasking with ball toss CGA inc'd dizziness, 5/5 dizziness  Exercise 9: Thumb push ups x6 (significant distance from nose) > 12-17 cm reporting 3-4/5 dizziness  Exercise 10: Gait drills: march and holds, cross crawls, Tandem( F/R) 0-1 UE support, increased difficulty reversing directions  Exercise 12: figure 8 walking around 2 bolsters with SBA , VCs  Exercise 13: ambulation over hurdles in // bars with 0- one UE support     *Indicates exercise, modality, or manual techniques to be initiated when appropriate    Objective Measures:      Ambulation  Device: No Device  Assistance: Supervision, Modified Independent  Quality of Gait: occassional lateral excursion and impromptu startle with vertical head turns  Gait Deviations: Slow Radha, Decreased step length, Decreased step height, Deviated path  Distance: 2 laps track =528ft    Strength: [x] NT  [] MMT completed:     Assessment: Body Structures, Functions, Activity Limitations Requiring Skilled Therapeutic Intervention: Decreased ROM, Decreased strength, Decreased endurance, Increased pain, Decreased posture, Decreased balance, Decreased functional mobility   Assessment: Continued bolster training with pt requiring VCs to avoid walking so close to objects, good carryover once alerted. Pt with increased dizziness with occulomotor activites, vertical > Horizontal. Added dual tasking with ball toss to self with increased dizziness and off balance reactions requiring CGA to correct. Pt states 3/5 dizziness after session. Treatment Diagnosis: impaired balance, impaired gait, decreased cervical AROM   Post-Pain Assessment:       Pain Rating (0-10 pain scale):   0/10   Location and pain description same as pre-treatment unless indicated. Action: [] NA   [] Perform HEP  [] Meds as prescribed  [] Modalities as prescribed   [] Call Physician     GOALS   Patient Goal(s): Patient Goals : \"not to have headaches and dizziness\"    Short Term Goals Completed by 4 weeks Goal Status   STG 1 Patient will report 25% reduction in dizziness symptoms. Met   STG 2 Patient will ambulate 150ft independently with improved bilateral step length and symmetry. Met   STG 3 Patient will report </= 1/10 pain in neck with movement for improved functional tolerance.  In progress, Partially met STG 4 Patient will demonstrates smooth pursuits WFLs for improved tracking. In progress   STG 5           Long Term Goals Completed by 6-8 weeks Goal Status   LTG 1 Patient will increase cervical AROM >/= 10-15 degrees each direction for improved functional tolerance. In progress, Partially met   LTG 2 Patient will increase strength in bilateral UEs to 5/5 for improved lifting tolerance. In progress   LTG 3 Verma balance >/= 50/56 to demontrate improved balance and reduce risk for falls. In progress       Plan:  Frequency/Duration:  Plan  Plan Frequency: 2  Plan weeks: 6-8  Current Treatment Recommendations: Strengthening, ROM, Endurance training, Functional mobility training, Gait training, Neuromuscular re-education, Manual Therapy - Soft Tissue Mobilization, Manual Therapy - Joint Manipulation, Pain management, Home exercise program, Patient/Caregiver education & training, Modalities, Vestibular rehab  Pt to continue current HEP. See objective section for any therapeutic exercise changes, additions or modifications this date.     Therapy Time:      PT Individual Minutes  Time In: 1005  Time Out: [address]  Minutes: 53  Timed Code Treatment Minutes: 53 Minutes  Procedure Minutes:0  Timed Activity Minutes Units   Neuro re ed 43 3   gait 10 1     Electronically signed by Jose Serna PTA on 10/27/22 at 12:21 PM EDT

## 2022-10-31 ENCOUNTER — HOSPITAL ENCOUNTER (OUTPATIENT)
Dept: PHYSICAL THERAPY | Age: 72
Setting detail: THERAPIES SERIES
Discharge: HOME | End: 2022-10-31
Payer: MEDICARE

## 2022-10-31 ENCOUNTER — OFFICE VISIT (OUTPATIENT)
Dept: NEUROLOGY | Age: 72
End: 2022-10-31
Payer: MEDICARE

## 2022-10-31 VITALS
SYSTOLIC BLOOD PRESSURE: 116 MMHG | HEART RATE: 73 BPM | OXYGEN SATURATION: 94 % | DIASTOLIC BLOOD PRESSURE: 82 MMHG | HEIGHT: 63 IN | WEIGHT: 184 LBS | TEMPERATURE: 97.8 F | BODY MASS INDEX: 32.6 KG/M2

## 2022-10-31 DIAGNOSIS — H81.91 VESTIBULOPATHY OF RIGHT EAR: ICD-10-CM

## 2022-10-31 DIAGNOSIS — S06.0X0A CONCUSSION WITHOUT LOSS OF CONSCIOUSNESS, INITIAL ENCOUNTER: ICD-10-CM

## 2022-10-31 DIAGNOSIS — R27.0 ATAXIA: ICD-10-CM

## 2022-10-31 DIAGNOSIS — S09.90XA CLOSED HEAD INJURY, INITIAL ENCOUNTER: Primary | ICD-10-CM

## 2022-10-31 DIAGNOSIS — S16.1XXA STRAIN OF NECK MUSCLE, INITIAL ENCOUNTER: ICD-10-CM

## 2022-10-31 PROCEDURE — 99214 OFFICE O/P EST MOD 30 MIN: CPT | Performed by: PSYCHIATRY & NEUROLOGY

## 2022-10-31 PROCEDURE — 1123F ACP DISCUSS/DSCN MKR DOCD: CPT | Performed by: PSYCHIATRY & NEUROLOGY

## 2022-10-31 PROCEDURE — 97112 NEUROMUSCULAR REEDUCATION: CPT

## 2022-10-31 RX ORDER — DIAZEPAM 5 MG/1
TABLET ORAL
COMMUNITY
Start: 2022-10-27 | End: 2022-11-03 | Stop reason: ALTCHOICE

## 2022-10-31 ASSESSMENT — ENCOUNTER SYMPTOMS
TROUBLE SWALLOWING: 0
VOMITING: 0
SHORTNESS OF BREATH: 0
CHOKING: 0
BACK PAIN: 0
PHOTOPHOBIA: 0
NAUSEA: 0
COLOR CHANGE: 0

## 2022-10-31 NOTE — PROGRESS NOTES
Subjective:      Patient ID: Jw Giordano is a 67 y.o. female who presents today for:  Chief Complaint   Patient presents with    New Patient     Pt is in PT. She state she missed a step coming out of the pool and hit her head on the ground. She states she is very sensitive to light and sounds. HPI 26-year-old right-handed female who is here for evaluation of head injury and concussion. July 25 patient was getting out of the pool and missed a step and fell. She fell actually on concrete and not in water. She felt fine the same day and will went into the pool but next day she started having all the symptoms of concussion. She is very ataxic and she has significant neck issues. Initially CT or evaluation of the neck was not done but CT of the head was done which did not show any significant and this was then followed by this cervical evaluations which appeared to be all normal there. With some minor changes noted. She is under therapy now and has improved considerably. She does have still some pressure though initially when she had the symptoms she has Sequent had pressure. A picture of her skull was reviewed and she had a significantly large posterior occipital parietal skin tear which is quite deep. This is  to touch and sometimes acts up. Her balance is still somewhat off but coming along pretty good. She has no oral or nasal discharge does not cough any difficulty memory or concentration she sleeps well. Past Medical History:   Diagnosis Date    Sinus infection      No past surgical history on file. Social History     Socioeconomic History    Marital status:      Spouse name: Not on file    Number of children: Not on file    Years of education: Not on file    Highest education level: Not on file   Occupational History    Not on file   Tobacco Use    Smoking status: Never    Smokeless tobacco: Never   Substance and Sexual Activity    Alcohol use: Yes    Drug use:  No Sexual activity: Never   Other Topics Concern    Not on file   Social History Narrative    Not on file     Social Determinants of Health     Financial Resource Strain: Low Risk     Difficulty of Paying Living Expenses: Not hard at all   Food Insecurity: No Food Insecurity    Worried About Running Out of Food in the Last Year: Never true    Ran Out of Food in the Last Year: Never true   Transportation Needs: Not on file   Physical Activity: Not on file   Stress: Not on file   Social Connections: Not on file   Intimate Partner Violence: Not on file   Housing Stability: Not on file     No family history on file. Allergies   Allergen Reactions    Penicillins Hives       Current Outpatient Medications   Medication Sig Dispense Refill    buPROPion (WELLBUTRIN XL) 300 MG extended release tablet Take 1 tablet by mouth every morning 90 tablet 2    PARoxetine (PAXIL) 10 MG tablet Take 1 tablet by mouth daily 90 tablet 3    topiramate (TOPAMAX) 50 MG tablet TAKE 1 TABLET BY MOUTH EVERY NIGHT 90 tablet 2    meloxicam (MOBIC) 15 MG tablet Take 15 mg by mouth daily      diazePAM (VALIUM) 5 MG tablet        No current facility-administered medications for this visit. Review of Systems   Constitutional:  Negative for fever. HENT:  Negative for ear pain, tinnitus and trouble swallowing. Eyes:  Negative for photophobia and visual disturbance. Respiratory:  Negative for choking and shortness of breath. Cardiovascular:  Negative for chest pain and palpitations. Gastrointestinal:  Negative for nausea and vomiting. Musculoskeletal:  Positive for gait problem. Negative for back pain, joint swelling, myalgias, neck pain and neck stiffness. Skin:  Negative for color change. Allergic/Immunologic: Negative for food allergies. Neurological:  Positive for dizziness, light-headedness and headaches. Negative for tremors, seizures, syncope, facial asymmetry, speech difficulty, weakness and numbness. Psychiatric/Behavioral:  Negative for behavioral problems, confusion, hallucinations and sleep disturbance. Objective:   /82   Pulse 73   Temp 97.8 °F (36.6 °C) (Temporal)   Ht 5' 3\" (1.6 m)   Wt 184 lb (83.5 kg)   SpO2 94%   BMI 32.59 kg/m²     Physical Exam  Vitals reviewed. Eyes:      Pupils: Pupils are equal, round, and reactive to light. Cardiovascular:      Rate and Rhythm: Normal rate and regular rhythm. Heart sounds: No murmur heard. Pulmonary:      Effort: Pulmonary effort is normal.      Breath sounds: Normal breath sounds. Abdominal:      General: Bowel sounds are normal.   Musculoskeletal:         General: Normal range of motion. Cervical back: Normal range of motion. Skin:     General: Skin is warm. Neurological:      Mental Status: She is alert and oriented to person, place, and time. Cranial Nerves: No cranial nerve deficit. Sensory: No sensory deficit. Motor: No abnormal muscle tone. Coordination: Coordination normal.      Deep Tendon Reflexes: Reflexes are normal and symmetric. Babinski sign absent on the right side. Babinski sign absent on the left side. Psychiatric:         Mood and Affect: Mood normal.     Except for minor balance issues her examination is entirely normal.  Patient does not have any numbness or tingling of her upper extremities and no myelopathic signs are notable. No nystagmus is seen. No results found.     Lab Results   Component Value Date/Time    WBC 5.0 03/31/2013 01:22 PM    RBC 4.44 03/31/2013 01:22 PM    HGB 13.8 03/31/2013 01:22 PM    HCT 42.1 03/31/2013 01:22 PM    MCV 94.8 03/31/2013 01:22 PM    MCH 31.1 03/31/2013 01:22 PM    MCHC 32.8 03/31/2013 01:22 PM    RDW 12.9 03/31/2013 01:22 PM     03/31/2013 01:22 PM    MPV 9.6 03/31/2013 01:22 PM     Lab Results   Component Value Date/Time     05/17/2021 10:45 AM    K 4.1 05/17/2021 10:45 AM     05/17/2021 10:45 AM    CO2 26 05/17/2021 10:45 AM    BUN 13 05/17/2021 10:45 AM    CREATININE 0.56 05/17/2021 10:45 AM    GFRAA >60.0 05/17/2021 10:45 AM    LABGLOM >60.0 05/17/2021 10:45 AM    GLUCOSE 107 05/17/2021 10:45 AM    PROT 6.6 05/17/2021 10:45 AM    LABALBU 4.5 05/17/2021 10:45 AM    CALCIUM 9.7 05/17/2021 10:45 AM    BILITOT 0.5 05/17/2021 10:45 AM    ALKPHOS 95 05/17/2021 10:45 AM    AST 19 05/17/2021 10:45 AM    ALT 16 05/17/2021 10:45 AM     No results found for: PROTIME, INR  Lab Results   Component Value Date/Time    TSH 3.240 05/17/2021 10:45 AM     Lab Results   Component Value Date/Time    TRIG 55 05/17/2021 10:45 AM     05/17/2021 10:45 AM    LDLCALC 95 05/17/2021 10:45 AM     No results found for: Gonazlo Cleaves, LABBENZ, CANNAB, COCAINESCRN, LABMETH, OPIATESCREENURINE, PHENCYCLIDINESCREENURINE, PPXUR, ETOH  No results found for: LITHIUM, DILFRTOT, VALPROATE    Assessment:       Diagnosis Orders   1. Closed head injury, initial encounter        2. Concussion without loss of consciousness, initial encounter        3. Vestibulopathy of right ear        4. Ataxia        Closed head injury which occurred July 25. Evaluations are as noted above and she did develop concussive symptoms which are now slowly improving. There was no loss of consciousness. Initial CT did not show thing significant. She also has spinal x-ray and likely had a cervical strain which still is present to some degree with some restriction of movements but no myelopathic or radicular findings are noted. She still has some residuals and this may take some more time to improve. There is no session of basal skull fracture there is no nasal or aural discharge. For now have not recommended any intervention except for continued therapy. In the event that she does not improve over a few months then we may need to consider an MRI of the cervical spine. On examination though she does not have myelopathic signs.       Plan:      No orders of the defined types were placed in this encounter. No orders of the defined types were placed in this encounter. No follow-ups on file.       Tawana Lopez MD

## 2022-10-31 NOTE — PROGRESS NOTES
[facility]  Outpatient Physical Therapy    Treatment Note        Date: 10/31/2022  Patient: Lissa Lindsey  : 1950   Confirmed: Yes  MRN: [id number]  Referring Provider: Jose Alberto Gallagher MD    Medical Diagnosis: Strain of muscle, fascia and tendon at neck level, initial encounter [S16. 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]       Treatment Diagnosis: impaired balance, impaired gait, decreased cervical AROM    Visit Information:  Insurance: Payor: [facility] MEDICARE / Plan: Elvis Arriaga / Product Type: *No Product type* /   PT Visit Information  Onset Date: 22  PT Insurance Information: [facility] Medicare  Total # of Visits Approved:  (BMN, $20 copay)  Total # of Visits to Date: 12  Plan of Care/Certification Expiration Date: 22  No Show: 1  Progress Note Due Date: 22  Canceled Appointment: 0  Progress Note Counter: -    Subjective Information:  Subjective: Pt states cutting the grass threw her off balance towards the end  HEP Compliance:  [x] Good [] Fair [] Poor [] Reports not doing due to:    Pain Screening  Patient Currently in Pain: Denies    Treatment:  Exercises:  Exercises  Exercise 1: uneven surface with and w/o ball on cone  Exercise 2: picking up bean bags from floor, lob, SBA<.CGA  Exercise 5: VOR x 1 standing (horizontal, vertical) with 3/5 dizziness reported  Exercise 7: Dual tasking with ball Pass and ball on cone, catagorizing, decreased ability to catagorize  Exercise 8: cui= 47  Exercise 13: head turns in hallway  Exercise 14: no wall with tapping all directions, FT,FA, PART TANDEM  Exercise 15: 4 square stepping inc'd diagonals, LOB when FT  Exercise 20: HEP: verbal: VOR in small ranges   *Indicates exercise, modality, or manual techniques to be initiated when appropriate    Objective Measures:      Strength: [x] NT  [] MMT completed:     ROM: [x] NT  [] ROM measurements:     Assessment: Body Structures, Functions, Activity Limitations Requiring Skilled Therapeutic Intervention: Decreased ROM, Decreased strength, Decreased endurance, Increased pain, Decreased posture, Decreased balance, Decreased functional mobility   Assessment: Pt with 4 pt improvement in PUTNAM score to 47/56. Pt  with increased dizziness when picking bean bags up from the floor, possibly d/t attempting to quickly, slightly improved with cueing to focus and slow down. Treatment Diagnosis: impaired balance, impaired gait, decreased cervical AROM   Post-Pain Assessment:       Pain Rating (0-10 pain scale):   0/10   Location and pain description same as pre-treatment unless indicated. Action: [x] NA   [] Perform HEP  [] Meds as prescribed  [] Modalities as prescribed   [] Call Physician     GOALS   Patient Goal(s): Patient Goals : \"not to have headaches and dizziness\"    Short Term Goals Completed by 4 weeks Goal Status   STG 1 Patient will report 25% reduction in dizziness symptoms. Met   STG 2 Patient will ambulate 150ft independently with improved bilateral step length and symmetry. Met   STG 3 Patient will report </= 1/10 pain in neck with movement for improved functional tolerance. In progress, Partially met   STG 4 Patient will demonstrates smooth pursuits WFLs for improved tracking. In progress   STG 5           Long Term Goals Completed by 6-8 weeks Goal Status   LTG 1 Patient will increase cervical AROM >/= 10-15 degrees each direction for improved functional tolerance. In progress, Partially met   LTG 2 Patient will increase strength in bilateral UEs to 5/5 for improved lifting tolerance. In progress   LTG 3 Putnam balance >/= 50/56 to demontrate improved balance and reduce risk for falls.  In progress       Plan:  Frequency/Duration:  Plan  Plan Frequency: 2  Plan weeks: 6-8  Current Treatment Recommendations: Strengthening, ROM, Endurance training, Functional mobility training, Gait training, Neuromuscular re-education, Manual Therapy - Soft Tissue Mobilization, Manual Therapy - Joint Manipulation, Pain management, Home exercise program, Patient/Caregiver education & training, Modalities, Vestibular rehab  Pt to continue current HEP. See objective section for any therapeutic exercise changes, additions or modifications this date.     Therapy Time:      PT Individual Minutes  Time In: 8588  Time Out: 6604  Minutes: 53  Timed Code Treatment Minutes: 53 Minutes  Procedure Minutes:0  Timed Activity Minutes Units   Neuro re ed 53 4     Electronically signed by Ketan Farias PTA on 10/31/22 at 12:55 PM EDT

## 2022-11-02 ENCOUNTER — HOSPITAL ENCOUNTER (OUTPATIENT)
Dept: PHYSICAL THERAPY | Age: 72
Setting detail: THERAPIES SERIES
Discharge: HOME | End: 2022-11-02
Payer: MEDICARE

## 2022-11-02 PROCEDURE — 97112 NEUROMUSCULAR REEDUCATION: CPT

## 2022-11-02 PROCEDURE — 97140 MANUAL THERAPY 1/> REGIONS: CPT

## 2022-11-02 ASSESSMENT — PAIN DESCRIPTION - DESCRIPTORS: DESCRIPTORS: BURNING;ACHING

## 2022-11-02 ASSESSMENT — PAIN DESCRIPTION - LOCATION: LOCATION: HEAD

## 2022-11-02 ASSESSMENT — PAIN SCALES - GENERAL: PAINLEVEL_OUTOF10: 6

## 2022-11-02 NOTE — PROGRESS NOTES
[facility]  Outpatient Physical Therapy    Treatment Note        Date: 2022  Patient: Peggy Goetz  : 1950   Confirmed: Yes  MRN: [id number]  Referring Provider: Leighann Rich MD    Medical Diagnosis: Strain of muscle, fascia and tendon at neck level, initial encounter [S16. 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]       Treatment Diagnosis: impaired balance, impaired gait, decreased cervical AROM    Visit Information:  Insurance: Payor: [facility] MEDICARE / Plan: Alanna Curling / Product Type: *No Product type* /   PT Visit Information  Onset Date: 22  PT Insurance Information: [facility] Medicare  Total # of Visits Approved:  (BMN, $20 copay)  Total # of Visits to Date: 15  Plan of Care/Certification Expiration Date: 22  No Show: 1  Progress Note Due Date: 22  Canceled Appointment: 0  Progress Note Counter: -    Subjective Information:  Subjective: Patient reports seeing Neurologist on Mon after PT appt. Patient reports fatigue and headache at the end of the day. Neurologst was pleased with patient's progress so far, would like to continue with PT and will F/U in 6 months. Has some swelling around incision, will continue to have for about 2 months or so. If still has difficulty with things, will have MRI. Has F/U with [facility] in about 2 weeks. Babysat 2 year old great granddaughter today, is feeling exhausted and presents with increased headache and fatigue today.   HEP Compliance:  [] Good [x] Fair [] Poor [] Reports not doing due to:    Pain Screening  Patient Currently in Pain: Yes  Pain Level: 6  Pain Location: Head  Pain Descriptors: Burning, Aching    Treatment:  Exercises:  Exercises  Exercise 6: Foam: FA with head turns (Horizontal), toe taps, supported SLS  Exercise 7: Dual tasking: bean bag pass around trunk with and with categorizing (Moderate difficulty categorizing at times)  Exercise 10: Gait drills: march and holds, cross crawls, cross overs Tandem( F), amb over obstacles F/L unsupported  Exercise 16: Stepping and reaching: therapist calling which LE to step and to which color boomwhacker- increased difficulty       Manual:   Manual Therapy  Soft Tissue Mobilizaton: STM to cervical mm R > L, TPR , suboccipital release with very gentle distraction  x5 min total           *Indicates exercise, modality, or manual techniques to be initiated when appropriate        Assessment: Body Structures, Functions, Activity Limitations Requiring Skilled Therapeutic Intervention: Decreased ROM, Decreased strength, Decreased endurance, Increased pain, Decreased posture, Decreased balance, Decreased functional mobility   Assessment: Treatment focused on higher level gait and balance activities. Oculomotor exercises held due to patient not feeling well after babysitting great grandchild this AM. Displays difficulty categorizing with dual tasking, mild unsteadiness as well. Ended session with gentle manual to improve pain and discomfort, patient with significant fatigue at end of session. Treatment Diagnosis: impaired balance, impaired gait, decreased cervical AROM  Therapy Prognosis: Good          Post-Pain Assessment:       Pain Rating (0-10 pain scale):   4/10   Location and pain description same as pre-treatment unless indicated. Action: [] NA   [] Perform HEP  [] Meds as prescribed  [x] Modalities as prescribed   [] Call Physician     GOALS   Patient Goal(s): Patient Goals : \"not to have headaches and dizziness\"    Short Term Goals Completed by 4 weeks Goal Status   STG 1 Patient will report 25% reduction in dizziness symptoms. Met   STG 2 Patient will ambulate 150ft independently with improved bilateral step length and symmetry.  Met   STG 3 Patient will report </= 1/10 pain in neck with movement for improved functional tolerance. In progress, Partially met   STG 4 Patient will demonstrates smooth pursuits WFLs for improved tracking. In progress     Long Term Goals Completed by 6-8 weeks Goal Status   LTG 1 Patient will increase cervical AROM >/= 10-15 degrees each direction for improved functional tolerance. In progress, Partially met   LTG 2 Patient will increase strength in bilateral UEs to 5/5 for improved lifting tolerance. In progress   LTG 3 Verma balance >/= 50/56 to demontrate improved balance and reduce risk for falls. In progress     Plan:  Frequency/Duration:  Plan  Plan Frequency: 2  Plan weeks: 6-8  Current Treatment Recommendations: Strengthening, ROM, Endurance training, Functional mobility training, Gait training, Neuromuscular re-education, Manual Therapy - Soft Tissue Mobilization, Manual Therapy - Joint Manipulation, Pain management, Home exercise program, Patient/Caregiver education & training, Modalities, Vestibular rehab  Pt to continue current HEP. See objective section for any therapeutic exercise changes, additions or modifications this date.     Therapy Time:      PT Individual Minutes  Time In: 7744  Time Out: 1500  Minutes: 55  Timed Code Treatment Minutes: 53 Minutes  Procedure Minutes:0  Timed Activity Minutes Units   Ther Ex 48 3   Manual  5 1     Electronically signed by Dima Sandoval PTA on 11/2/22 at 12:35 PM EDT

## 2022-11-03 ENCOUNTER — OFFICE VISIT (OUTPATIENT)
Dept: FAMILY MEDICINE CLINIC | Age: 72
End: 2022-11-03
Payer: MEDICARE

## 2022-11-03 VITALS
DIASTOLIC BLOOD PRESSURE: 76 MMHG | BODY MASS INDEX: 32.6 KG/M2 | SYSTOLIC BLOOD PRESSURE: 132 MMHG | WEIGHT: 184 LBS | HEIGHT: 63 IN | HEART RATE: 88 BPM

## 2022-11-03 VITALS
HEART RATE: 88 BPM | WEIGHT: 184 LBS | HEIGHT: 63 IN | SYSTOLIC BLOOD PRESSURE: 132 MMHG | BODY MASS INDEX: 32.6 KG/M2 | DIASTOLIC BLOOD PRESSURE: 76 MMHG

## 2022-11-03 DIAGNOSIS — G44.301 INTRACTABLE POST-TRAUMATIC HEADACHE, UNSPECIFIED CHRONICITY PATTERN: ICD-10-CM

## 2022-11-03 DIAGNOSIS — Z13.220 SCREENING FOR LIPID DISORDERS: ICD-10-CM

## 2022-11-03 DIAGNOSIS — Z00.00 MEDICARE ANNUAL WELLNESS VISIT, SUBSEQUENT: Primary | ICD-10-CM

## 2022-11-03 DIAGNOSIS — F33.0 MAJOR DEPRESSIVE DISORDER, RECURRENT, MILD (HCC): ICD-10-CM

## 2022-11-03 DIAGNOSIS — F07.81 POST CONCUSSION SYNDROME: Primary | ICD-10-CM

## 2022-11-03 LAB
ALBUMIN SERPL-MCNC: 4.2 G/DL (ref 3.5–4.6)
ALP BLD-CCNC: 82 U/L (ref 40–130)
ALT SERPL-CCNC: 24 U/L (ref 0–33)
ANION GAP SERPL CALCULATED.3IONS-SCNC: 11 MEQ/L (ref 9–15)
AST SERPL-CCNC: 29 U/L (ref 0–35)
BILIRUB SERPL-MCNC: 0.5 MG/DL (ref 0.2–0.7)
BUN BLDV-MCNC: 15 MG/DL (ref 8–23)
CALCIUM SERPL-MCNC: 9.2 MG/DL (ref 8.5–9.9)
CHLORIDE BLD-SCNC: 104 MEQ/L (ref 95–107)
CHOLESTEROL, TOTAL: 221 MG/DL (ref 0–199)
CO2: 27 MEQ/L (ref 20–31)
CREAT SERPL-MCNC: 0.67 MG/DL (ref 0.5–0.9)
GFR SERPL CREATININE-BSD FRML MDRD: >60 ML/MIN/{1.73_M2}
GLOBULIN: 2.4 G/DL (ref 2.3–3.5)
GLUCOSE BLD-MCNC: 112 MG/DL (ref 70–99)
HDLC SERPL-MCNC: 95 MG/DL (ref 40–59)
LDL CHOLESTEROL CALCULATED: 113 MG/DL (ref 0–129)
POTASSIUM SERPL-SCNC: 4.6 MEQ/L (ref 3.4–4.9)
SODIUM BLD-SCNC: 142 MEQ/L (ref 135–144)
TOTAL PROTEIN: 6.6 G/DL (ref 6.3–8)
TRIGL SERPL-MCNC: 66 MG/DL (ref 0–150)

## 2022-11-03 PROCEDURE — 1123F ACP DISCUSS/DSCN MKR DOCD: CPT | Performed by: NURSE PRACTITIONER

## 2022-11-03 PROCEDURE — 99214 OFFICE O/P EST MOD 30 MIN: CPT | Performed by: NURSE PRACTITIONER

## 2022-11-03 PROCEDURE — G0439 PPPS, SUBSEQ VISIT: HCPCS | Performed by: NURSE PRACTITIONER

## 2022-11-03 ASSESSMENT — PATIENT HEALTH QUESTIONNAIRE - PHQ9
SUM OF ALL RESPONSES TO PHQ9 QUESTIONS 1 & 2: 2
5. POOR APPETITE OR OVEREATING: 0
4. FEELING TIRED OR HAVING LITTLE ENERGY: 0
SUM OF ALL RESPONSES TO PHQ QUESTIONS 1-9: 4
8. MOVING OR SPEAKING SO SLOWLY THAT OTHER PEOPLE COULD HAVE NOTICED. OR THE OPPOSITE, BEING SO FIGETY OR RESTLESS THAT YOU HAVE BEEN MOVING AROUND A LOT MORE THAN USUAL: 0
5. POOR APPETITE OR OVEREATING: 0
SUM OF ALL RESPONSES TO PHQ QUESTIONS 1-9: 4
SUM OF ALL RESPONSES TO PHQ QUESTIONS 1-9: 0
2. FEELING DOWN, DEPRESSED OR HOPELESS: 1
8. MOVING OR SPEAKING SO SLOWLY THAT OTHER PEOPLE COULD HAVE NOTICED. OR THE OPPOSITE, BEING SO FIGETY OR RESTLESS THAT YOU HAVE BEEN MOVING AROUND A LOT MORE THAN USUAL: 0
SUM OF ALL RESPONSES TO PHQ9 QUESTIONS 1 & 2: 0
SUM OF ALL RESPONSES TO PHQ QUESTIONS 1-9: 4
3. TROUBLE FALLING OR STAYING ASLEEP: 0
SUM OF ALL RESPONSES TO PHQ QUESTIONS 1-9: 0
2. FEELING DOWN, DEPRESSED OR HOPELESS: 0
SUM OF ALL RESPONSES TO PHQ QUESTIONS 1-9: 4
10. IF YOU CHECKED OFF ANY PROBLEMS, HOW DIFFICULT HAVE THESE PROBLEMS MADE IT FOR YOU TO DO YOUR WORK, TAKE CARE OF THINGS AT HOME, OR GET ALONG WITH OTHER PEOPLE: 0
9. THOUGHTS THAT YOU WOULD BE BETTER OFF DEAD, OR OF HURTING YOURSELF: 0
7. TROUBLE CONCENTRATING ON THINGS, SUCH AS READING THE NEWSPAPER OR WATCHING TELEVISION: 0
6. FEELING BAD ABOUT YOURSELF - OR THAT YOU ARE A FAILURE OR HAVE LET YOURSELF OR YOUR FAMILY DOWN: 0
9. THOUGHTS THAT YOU WOULD BE BETTER OFF DEAD, OR OF HURTING YOURSELF: 0
3. TROUBLE FALLING OR STAYING ASLEEP: 1
7. TROUBLE CONCENTRATING ON THINGS, SUCH AS READING THE NEWSPAPER OR WATCHING TELEVISION: 0
1. LITTLE INTEREST OR PLEASURE IN DOING THINGS: 0
SUM OF ALL RESPONSES TO PHQ QUESTIONS 1-9: 0
SUM OF ALL RESPONSES TO PHQ QUESTIONS 1-9: 0
1. LITTLE INTEREST OR PLEASURE IN DOING THINGS: 1
4. FEELING TIRED OR HAVING LITTLE ENERGY: 1
10. IF YOU CHECKED OFF ANY PROBLEMS, HOW DIFFICULT HAVE THESE PROBLEMS MADE IT FOR YOU TO DO YOUR WORK, TAKE CARE OF THINGS AT HOME, OR GET ALONG WITH OTHER PEOPLE: 0
6. FEELING BAD ABOUT YOURSELF - OR THAT YOU ARE A FAILURE OR HAVE LET YOURSELF OR YOUR FAMILY DOWN: 0

## 2022-11-03 ASSESSMENT — LIFESTYLE VARIABLES: HOW OFTEN DO YOU HAVE A DRINK CONTAINING ALCOHOL: NEVER

## 2022-11-07 ENCOUNTER — HOSPITAL ENCOUNTER (OUTPATIENT)
Dept: PHYSICAL THERAPY | Age: 72
Setting detail: THERAPIES SERIES
Discharge: HOME | End: 2022-11-07
Payer: MEDICARE

## 2022-11-07 PROCEDURE — 97112 NEUROMUSCULAR REEDUCATION: CPT

## 2022-11-07 PROCEDURE — 97140 MANUAL THERAPY 1/> REGIONS: CPT

## 2022-11-07 ASSESSMENT — PAIN SCALES - GENERAL: PAINLEVEL_OUTOF10: 0

## 2022-11-07 NOTE — PROGRESS NOTES
[facility]  Outpatient Physical Therapy    Treatment Note        Date: 2022  Patient: Doreen Cunha  : 1950   Confirmed: Yes  MRN: [id number]  Referring Provider: Tangela Perez MD    Medical Diagnosis: Strain of muscle, fascia and tendon at neck level, initial encounter [S16. 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]       Treatment Diagnosis: impaired balance, impaired gait, decreased cervical AROM    Visit Information:  Insurance: Payor: [facility] MEDICARE / Plan: Carlos Barreto / Product Type: *No Product type* /   PT Visit Information  Onset Date: 22  PT Insurance Information: [facility] Medicare  Total # of Visits Approved:  (BMN, $20 copay)  Total # of Visits to Date: 15  Plan of Care/Certification Expiration Date: 22  No Show: 1  Progress Note Due Date: 22  Canceled Appointment: 0  Progress Note Counter: -    Subjective Information:  Subjective: Patient reports she went to [facility] and had more dizziness after due to the crowd. Reports she forgets things (reports she lost her wallet but did recover it).   HEP Compliance:  [x] Good [] Fair [] Poor [] Reports not doing due to:    Pain Screening  Patient Currently in Pain: No  Pain Assessment: 0-10  Pain Level: 0    Treatment:  Exercises:  Exercises  Exercise 6: Foam: FA with head turns (Horizontal), FA, toe taps, supported SLS  Exercise 10: Gait drills: march and , cross crawls, cross overs Tandem( F), amb over obstacles F/L unsupported  Exercise 12: figure 8 walking around 2 bolsters with SBA to supervision , improved ability to navigate  Exercise 16: stepping and reaching: theraist calling out color of cones to reach and touch (cones on table) SBA       Manual:   Manual Therapy  Soft Tissue Mobilizaton: STM to cervical muscles and bilateral upper trap x 6 minutes       Modalities:  Moist Heat (CPT 85122)  Patient Position: Seated  Number Minutes Moist Heat: 10  Moist heat location: Cervical  Post treatment skin assessment: Redness - no adverse reaction     *Indicates exercise, modality, or manual techniques to be initiated when appropriate    Objective Measures:        Strength: [x] NT  [] MMT completed:           ROM: [x] NT  [] ROM measurements:           Assessment: Body Structures, Functions, Activity Limitations Requiring Skilled Therapeutic Intervention: Decreased ROM, Decreased strength, Decreased endurance, Increased pain, Decreased posture, Decreased balance, Decreased functional mobility   Assessment: Patient demonstrates LOB with balance activities but able to recover reaching for handrail. Reports dizziness with balance activities needing rest breaks for improved tolerance. Patient demonstrates improved ability to ambulate around bolster but goes very slowly. Reports tightness in cervical region and STM to decrease tightness with HP this date to improve. Treatment Diagnosis: impaired balance, impaired gait, decreased cervical AROM        Post-Pain Assessment:       Pain Rating (0-10 pain scale):   0/10   Location and pain description same as pre-treatment unless indicated. Action: [] NA   [] Perform HEP  [] Meds as prescribed  [] Modalities as prescribed   [] Call Physician     GOALS   Patient Goal(s): Patient Goals : \"not to have headaches and dizziness\"    Short Term Goals Completed by 4 weeks Goal Status   STG 1 Patient will report 25% reduction in dizziness symptoms. Met   STG 2 Patient will ambulate 150ft independently with improved bilateral step length and symmetry. Met   STG 3 Patient will report </= 1/10 pain in neck with movement for improved functional tolerance. In progress, Partially met   STG 4 Patient will demonstrates smooth pursuits WFLs for improved tracking.  In progress     Long Term Goals Completed by 6-8 weeks Goal Status   LTG 1 Patient will increase cervical AROM >/= 10-15 degrees each direction for improved functional tolerance. In progress, Partially met   LTG 2 Patient will increase strength in bilateral UEs to 5/5 for improved lifting tolerance. In progress   LTG 3 Verma balance >/= 50/56 to demontrate improved balance and reduce risk for falls. In progress     Plan:  Frequency/Duration:  Plan  Plan Frequency: 2  Plan weeks: 6-8  Current Treatment Recommendations: Strengthening, ROM, Endurance training, Functional mobility training, Gait training, Neuromuscular re-education, Manual Therapy - Soft Tissue Mobilization, Manual Therapy - Joint Manipulation, Pain management, Home exercise program, Patient/Caregiver education & training, Modalities, Vestibular rehab  Pt to continue current HEP. See objective section for any therapeutic exercise changes, additions or modifications this date.     Therapy Time:      PT Individual Minutes  Time In: 0900  Time Out: 1006  Minutes: 66  Timed Code Treatment Minutes: 56 Minutes  Procedure Minutes:10 minutes hot pack  Timed Activity Minutes Units   Neuro re-ed 50 3   Manual  6 1     Electronically signed by Nichole Singh, PT on 11/7/22 at 11:29 AM EST

## 2022-11-09 ENCOUNTER — HOSPITAL ENCOUNTER (OUTPATIENT)
Dept: PHYSICAL THERAPY | Age: 72
Setting detail: THERAPIES SERIES
Discharge: HOME | End: 2022-11-09
Payer: MEDICARE

## 2022-11-09 PROCEDURE — 97112 NEUROMUSCULAR REEDUCATION: CPT

## 2022-11-09 ASSESSMENT — PAIN SCALES - GENERAL: PAINLEVEL_OUTOF10: 4

## 2022-11-09 ASSESSMENT — PAIN DESCRIPTION - LOCATION: LOCATION: HEAD

## 2022-11-09 NOTE — PROGRESS NOTES
[address]     Ph: [phone]  Fax: [phone]      [] Certification  [x] Recertification []  Plan of Care  [x] Progress Note [] Discharge      Referring Provider: Sumeet Burrell MD     From:  Nina Puentes, PT  Patient: Anish Rocha (80 y.o. female) : 1950 Date: 2022  Medical Diagnosis: Strain of muscle, fascia and tendon at neck level, initial encounter [S16. 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]       Treatment Diagnosis: impaired balance, impaired gait, decreased cervical AROM    Plan of Care/Certification Expiration Date: : 23   Progress Report Period from:  10/13/2022  to 2022    Visits to Date: 15 No Show: 1 Cancelled Appts: 0    OBJECTIVE:   Short Term Goals - Time Frame for Short Term Goals: 4 weeks    Goals Current/Discharge status  Status   Short Term Goal 1: Patient will report 25% reduction in dizziness symptoms. Pt report ongoing dizziness Met   Short Term Goal 2: Patient will ambulate 150ft independently with improved bilateral step length and symmetry. Ambulation  Device: No Device  Assistance: Supervision, Modified Independent  Quality of Gait: occassional lateral excursion and impromptu startle with vertical head turns  Gait Deviations: Slow Radha, Decreased step length, Decreased step height, Deviated path  Distance: 2 laps track =528ft Met   Short Term Goal 3: Patient will report </= 1/10 pain in neck with movement for improved functional tolerance. 4/10 this date due to increased housework In progress, Partially met   Short Term Goal 4: Patient will demonstrates smooth pursuits WFLs for improved tracking.   Difficulty focusing with smooth pursuits with increased symptoms In progress     Long Term Goals - Time Frame for Long Term Goals : 6 weeks  Goals Current/ Discharge status Status   Long Term Goal 1: Patient will increase cervical AROM >/= 10-15 degrees each direction for improved functional tolerance. AROM Cervical Spine   Cervical Spine AROM : Painful  Cervical flexion: 55  Cervical extension: 40  Cervical right lateral: 30  Cervical left lateral: 23  Cervical right rotation: 29  Cervical left rotation: 39    In progress, Partially met   Long Term Goal 2: Patient will increase strength in bilateral UEs to 5/5 for improved lifting tolerance. Strength LUE  L Shoulder Flexion: 5/5  L Shoulder Extension: 5/5  L Shoulder ABduction: 5/5  L Elbow Flexion: 5/5  L Elbow Extension: 5/5  L Wrist Flexion: 5/5  L Wrist Extension: 5/5  Strength RUE  R Shoulder Flexion: 5/5  R Shoulder Extension: 5/5  R Shoulder ABduction: 5/5  R Elbow Flexion: 5/5  R Elbow Extension: 5/5  R Wrist Flexion: 5/5  R Wrist Extension: 5/5    Met   Long Term Goal 3: Verma balance >/= 50/56 to demontrate improved balance and reduce risk for falls. Verma Balance Score: 50 Met    NEW GOAL: DGI >/= 21/24 to reduce pt's risk for falls. Dynamic Gait Total Score: 14      NEW GOAL: Pt be able to ambulate in a busy environment with scanning with minimal to no deviations and minimal dizziness reported. Body Structures, Functions, Activity Limitations Requiring Skilled Therapeutic Intervention: Decreased ROM, Decreased strength, Decreased endurance, Increased pain, Decreased posture, Decreased balance, Decreased functional mobility   Assessment: Pt demonstrates significant improved static standing balance as seen with improved Verma score. Pt demonstrates good sin UE strength but a slight decline in cervical ROM likely due to increased pain over the past day. Pt continues to report increased dizziness with oculomotor exercises.   Pt with poor convergence likely contributing to her increased dizziness symptoms. Pt would benefit from further skilled PT to improve her ROM, dizziness and balance to improve her mobility and QOL. PLAN: [] Evaluate and Treat  Frequency/Duration:  Plan Frequency: 1-2  Plan weeks: 6  Current Treatment Recommendations: Strengthening, ROM, Endurance training, Functional mobility training, Gait training, Neuromuscular re-education, Manual Therapy - Soft Tissue Mobilization, Manual Therapy - Joint Manipulation, Pain management, Home exercise program, Patient/Caregiver education & training, Modalities, Vestibular rehab, Integrated dry needling              Patient Status:[x] Continue/ Initiate plan of Care    [] Discharge PT. Recommend pt continue with HEP. [] Additional visits requested, Please re-certify for additional visits:    [] Hold         Signature: Electronically signed by Rina Jimenez PT on 11/9/22 at 1:00 PM EST      If you have any questions or concerns, please don't hesitate to call. Thank you for your referral.    I have reviewed this plan of care and certify a need for medically necessary rehabilitation services.     Physician Signature:__________________________________________________________  Date:  Please sign and return

## 2022-11-09 NOTE — PROGRESS NOTES
[facility]  Outpatient Physical Therapy    Treatment Note        Date: 2022  Patient: Judy Cunha  : 1950   Confirmed: Yes  MRN: [id number]  Referring Provider: Patricia Gaxiola MD    Medical Diagnosis: Strain of muscle, fascia and tendon at neck level, initial encounter [S16. 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]       Treatment Diagnosis: impaired balance, impaired gait, decreased cervical AROM    Visit Information:  Insurance: Payor: [facility] MEDICARE / Plan: Wally Mcclure / Product Type: *No Product type* /   PT Visit Information  Onset Date: 22  PT Insurance Information: [facility] Medicare  Total # of Visits Approved:  (BMN, $20 copay)  Total # of Visits to Date: 15  Plan of Care/Certification Expiration Date: 22  No Show: 1  Progress Note Due Date: 22  Canceled Appointment: 0  Progress Note Counter: 15/12-    Subjective Information:  Subjective: Had a bad day yesterday - had to move a big flower pot and then raked some leaves while sitting. Later that day head really started hurting and lasted most of the evening. Rt side of neck started hurting and had to put a heating pad on it.   HEP Compliance:  [x] Good [] Fair [] Poor [] Reports not doing due to:    Pain Screening  Patient Currently in Pain: Yes  Pain Assessment: 0-10  Pain Level: 4  Pain Location: Head    Treatment:  Exercises:  Exercises  Exercise 4: Smooth pursuits: horiz, vert 30s x1 ea with difficulty focusing  Exercise 5: VOR x1 with difficulty - improves with target further away  Exercise 9: Abnormal convergence >12 in from nose, convergence with 3 spots on paper with difficulty       Objective Measures:   Verma Balance Score: 50    Dynamic Gait Total Score: 14    Strength: [] NT  [x] MMT completed:  Strength RUE  R Shoulder Flexion: 5/5  R Shoulder Extension: 5/5  R Shoulder ABduction: 5/5  R Elbow Flexion: 5/5  R Elbow Extension: 5/5  R Wrist Flexion: 5/5  R Wrist Extension: 5/5  Strength LUE  L Shoulder Flexion: 5/5  L Shoulder Extension: 5/5  L Shoulder ABduction: 5/5  L Elbow Flexion: 5/5  L Elbow Extension: 5/5  L Wrist Flexion: 5/5  L Wrist Extension: 5/5       ROM: [] NT  [x] ROM measurements:    AROM Cervical Spine   Cervical Spine AROM : Painful  Cervical flexion: 55  Cervical extension: 40  Cervical right lateral: 30  Cervical left lateral: 23  Cervical right rotation: 29  Cervical left rotation: 39     Assessment: Body Structures, Functions, Activity Limitations Requiring Skilled Therapeutic Intervention: Decreased ROM, Decreased strength, Decreased endurance, Increased pain, Decreased posture, Decreased balance, Decreased functional mobility   Assessment: Pt demonstrates significant improved static standing balance as seen with improved Verma score. Pt demonstrates good sin UT strength but a slight decline in cervical ROM likely due to increased pain over the past day. Pt continues to report increased dizziness with oculomotor exercises. Pt with poor convergence likely contributing to her increased dizziness symptoms. Pt would benefit from further skilled PT to improve her ROM, dizziness and balance to improve her mobility and QOL. Treatment Diagnosis: impaired balance, impaired gait, decreased cervical AROM        Activity Tolerance  Activity Tolerance: Patient tolerated treatment well    Post-Pain Assessment:       Pain Rating (0-10 pain scale): 4  /10   Location and pain description same as pre-treatment unless indicated.    Action: [] NA   [] Perform HEP  [x] Meds as prescribed  [x] Modalities as prescribed   [] Call Physician     GOALS   Patient Goal(s): Patient Goals : \"not to have headaches and dizziness\"    Short Term Goals Completed by 4 weeks Goal Status   STG 1 Patient will report 25% reduction in dizziness symptoms. Met   STG 2 Patient will ambulate 150ft independently with improved bilateral step length and symmetry. Met   STG 3 Patient will report </= 1/10 pain in neck with movement for improved functional tolerance. In progress, Partially met   STG 4 Patient will demonstrates smooth pursuits WFLs for improved tracking. In progress       Long Term Goals Completed by 6-8 weeks Goal Status   LTG 1 Patient will increase cervical AROM >/= 10-15 degrees each direction for improved functional tolerance. In progress, Partially met   LTG 2 Patient will increase strength in bilateral UEs to 5/5 for improved lifting tolerance. Met   LTG 3 Verma balance >/= 50/56 to demontrate improved balance and reduce risk for falls. Met            Plan:  Frequency/Duration:  Plan  Plan Frequency: 2  Plan weeks: 6-8  Current Treatment Recommendations: Strengthening, ROM, Endurance training, Functional mobility training, Gait training, Neuromuscular re-education, Manual Therapy - Soft Tissue Mobilization, Manual Therapy - Joint Manipulation, Pain management, Home exercise program, Patient/Caregiver education & training, Modalities, Vestibular rehab  Pt to continue current HEP. See objective section for any therapeutic exercise changes, additions or modifications this date.     Therapy Time:      PT Individual Minutes  Time In: 5797  Time Out: 1000  Minutes: 55  Timed Code Treatment Minutes: 54 Minutes  Procedure Minutes:0  Timed Activity Minutes Units   Neuro marichuy 54 4     Electronically signed by Shantel Gardner PT on 11/9/22 at 12:47 PM EST

## 2022-11-13 ASSESSMENT — ENCOUNTER SYMPTOMS
DIARRHEA: 0
GASTROINTESTINAL NEGATIVE: 1
CONSTIPATION: 0
COLOR CHANGE: 0
VOMITING: 0
VOICE CHANGE: 0
ALLERGIC/IMMUNOLOGIC NEGATIVE: 1
BLOOD IN STOOL: 0
BLURRED VISION: 1
ABDOMINAL PAIN: 0
TROUBLE SWALLOWING: 0
SHORTNESS OF BREATH: 0
RECTAL PAIN: 0
RESPIRATORY NEGATIVE: 1
ANAL BLEEDING: 0

## 2022-11-14 ENCOUNTER — HOSPITAL ENCOUNTER (OUTPATIENT)
Dept: PHYSICAL THERAPY | Age: 72
Setting detail: THERAPIES SERIES
Discharge: HOME | End: 2022-11-14
Payer: MEDICARE

## 2022-11-14 PROCEDURE — 97140 MANUAL THERAPY 1/> REGIONS: CPT

## 2022-11-14 PROCEDURE — 97112 NEUROMUSCULAR REEDUCATION: CPT

## 2022-11-14 ASSESSMENT — PAIN DESCRIPTION - DESCRIPTORS: DESCRIPTORS: BURNING;ACHING

## 2022-11-14 ASSESSMENT — PAIN DESCRIPTION - LOCATION: LOCATION: HEAD;NECK

## 2022-11-14 ASSESSMENT — PAIN SCALES - GENERAL: PAINLEVEL_OUTOF10: 5

## 2022-11-14 NOTE — PATIENT INSTRUCTIONS
Personalized Preventive Plan for Mallorie Pura - 11/3/2022  Medicare offers a range of preventive health benefits. Some of the tests and screenings are paid in full while other may be subject to a deductible, co-insurance, and/or copay. Some of these benefits include a comprehensive review of your medical history including lifestyle, illnesses that may run in your family, and various assessments and screenings as appropriate. After reviewing your medical record and screening and assessments performed today your provider may have ordered immunizations, labs, imaging, and/or referrals for you. A list of these orders (if applicable) as well as your Preventive Care list are included within your After Visit Summary for your review. Other Preventive Recommendations:    A preventive eye exam performed by an eye specialist is recommended every 1-2 years to screen for glaucoma; cataracts, macular degeneration, and other eye disorders. A preventive dental visit is recommended every 6 months. Try to get at least 150 minutes of exercise per week or 10,000 steps per day on a pedometer . Order or download the FREE \"Exercise & Physical Activity: Your Everyday Guide\" from [employer]. Call 9-768.395.8585 or search [employer] online. You need 9516-5946 mg of calcium and 7334-6838 IU of vitamin D per day. It is possible to meet your calcium requirement with diet alone, but a vitamin D supplement is usually necessary to meet this goal.  When exposed to the sun, use a sunscreen that protects against both UVA and UVB radiation with an SPF of 30 or greater. Reapply every 2 to 3 hours or after sweating, drying off with a towel, or swimming. Always wear a seat belt when traveling in a car. Always wear a helmet when riding a bicycle or motorcycle.

## 2022-11-14 NOTE — PROGRESS NOTES
Subjective  Shellie Medellin, 67 y.o. female presents today with:  Chief Complaint   Patient presents with    6 Month Follow-Up           Depression    Health Maintenance     Refuse colonoscopy        Head Injury   The incident occurred more than 1 week ago (July 25). The injury mechanism was a fall. There was no loss of consciousness. The volume of blood lost was moderate. The quality of the pain is described as throbbing. The pain is moderate. The pain has been constant since the injury. Associated symptoms include blurred vision and headaches. Pertinent negatives include no disorientation, memory loss, numbness, tinnitus, vomiting or weakness. Associated symptoms comments: dizziness. She has tried NSAIDs and acetaminophen for the symptoms. The treatment provided mild relief. DepressionPatient is not experiencing: nervousness/anxiety, palpitations and shortness of breath. Review of Systems   Constitutional: Negative. Negative for activity change, appetite change, fatigue and unexpected weight change. HENT: Negative. Negative for dental problem, nosebleeds, tinnitus, trouble swallowing and voice change. Eyes:  Positive for blurred vision. Negative for visual disturbance. Respiratory: Negative. Negative for shortness of breath. Cardiovascular: Negative. Negative for chest pain, palpitations and leg swelling. Gastrointestinal: Negative. Negative for abdominal pain, anal bleeding, blood in stool, constipation, diarrhea, rectal pain and vomiting. Endocrine: Negative. Negative for cold intolerance, heat intolerance, polydipsia, polyphagia and polyuria. Genitourinary: Negative. Musculoskeletal: Negative. Skin: Negative. Negative for color change and rash. Allergic/Immunologic: Negative. Neurological:  Positive for dizziness, light-headedness and headaches. Negative for tremors, seizures, syncope, facial asymmetry, speech difficulty, weakness and numbness.    Hematological: Negative. Negative for adenopathy. Does not bruise/bleed easily. Psychiatric/Behavioral:  Positive for depression. Negative for dysphoric mood, memory loss and sleep disturbance. The patient is not nervous/anxious. Past Medical History:   Diagnosis Date    Sinus infection      No past surgical history on file. Social History     Socioeconomic History    Marital status:      Spouse name: Not on file    Number of children: Not on file    Years of education: Not on file    Highest education level: Not on file   Occupational History    Not on file   Tobacco Use    Smoking status: Never    Smokeless tobacco: Never   Substance and Sexual Activity    Alcohol use: Yes    Drug use: No    Sexual activity: Never   Other Topics Concern    Not on file   Social History Narrative    Not on file     Social Determinants of Health     Financial Resource Strain: Low Risk     Difficulty of Paying Living Expenses: Not hard at all   Food Insecurity: No Food Insecurity    Worried About Running Out of Food in the Last Year: Never true    Almaz of Food in the Last Year: Never true   Transportation Needs: Not on file   Physical Activity: Inactive    Days of Exercise per Week: 0 days    Minutes of Exercise per Session: 0 min   Stress: Not on file   Social Connections: Not on file   Intimate Partner Violence: Not on file   Housing Stability: Not on file     No family history on file. Allergies   Allergen Reactions    Penicillins Hives     Current Outpatient Medications   Medication Sig Dispense Refill    buPROPion (WELLBUTRIN XL) 300 MG extended release tablet Take 1 tablet by mouth every morning 90 tablet 2    PARoxetine (PAXIL) 10 MG tablet Take 1 tablet by mouth daily 90 tablet 3    topiramate (TOPAMAX) 50 MG tablet TAKE 1 TABLET BY MOUTH EVERY NIGHT 90 tablet 2    meloxicam (MOBIC) 15 MG tablet Take 15 mg by mouth daily       No current facility-administered medications for this visit.      PMH, Surgical Hx, Family Hx, and Social Hx reviewed and updated. Health Maintenance reviewed. Objective    Vitals:    11/03/22 0909   BP: 132/76   Pulse: 88   Weight: 184 lb (83.5 kg)   Height: 5' 3\" (1.6 m)       Physical Exam  Constitutional:       General: She is not in acute distress. Appearance: Normal appearance. She is well-developed. She is not ill-appearing, toxic-appearing or diaphoretic. HENT:      Head: Normocephalic and atraumatic. Right Ear: Tympanic membrane, ear canal and external ear normal.      Left Ear: Tympanic membrane, ear canal and external ear normal.      Nose: Nose normal.      Mouth/Throat:      Mouth: Mucous membranes are moist.   Eyes:      General: Lids are normal.      Extraocular Movements:      Right eye: No nystagmus. Left eye: No nystagmus. Conjunctiva/sclera: Conjunctivae normal.      Pupils: Pupils are equal, round, and reactive to light. Neck:      Thyroid: No thyroid mass or thyromegaly. Vascular: No carotid bruit or JVD. Trachea: Trachea normal. No tracheal deviation. Cardiovascular:      Rate and Rhythm: Normal rate and regular rhythm. Pulses: Normal pulses. Heart sounds: Normal heart sounds, S1 normal and S2 normal. No murmur heard. No gallop. Pulmonary:      Effort: Pulmonary effort is normal. No accessory muscle usage or respiratory distress. Breath sounds: Normal breath sounds. No decreased breath sounds, wheezing, rhonchi or rales. Abdominal:      General: Bowel sounds are normal. There is no distension. Palpations: Abdomen is soft. There is no hepatomegaly, splenomegaly or mass. Tenderness: There is no abdominal tenderness. Hernia: No hernia is present. Musculoskeletal:         General: Normal range of motion. Cervical back: Normal range of motion and neck supple.    Lymphadenopathy:      Head:      Right side of head: No submandibular, tonsillar, preauricular or posterior auricular adenopathy. Left side of head: No submandibular, tonsillar, preauricular or posterior auricular adenopathy. Cervical: No cervical adenopathy. Skin:     General: Skin is warm and dry. Capillary Refill: Capillary refill takes less than 2 seconds. Coloration: Skin is not pale. Neurological:      General: No focal deficit present. Mental Status: She is alert and oriented to person, place, and time. GCS: GCS eye subscore is 4. GCS verbal subscore is 5. GCS motor subscore is 6. Cranial Nerves: No cranial nerve deficit. Sensory: No sensory deficit. Motor: No weakness, tremor, atrophy, abnormal muscle tone or seizure activity. Coordination: Coordination normal.      Gait: Gait normal.      Deep Tendon Reflexes: Reflexes are normal and symmetric. Reflexes normal.   Psychiatric:         Mood and Affect: Mood normal.         Speech: Speech normal.         Behavior: Behavior normal. Behavior is cooperative. No evidence of parenchymal hemorrhages or contusions. No evidence of intra or extra-axial fluid collection is seen. Ventricles and sulci normal in size and configuration. No extra-axial collection. No cerebral edema or mass effect. Scattered areas of low attenuation visualized in the periventricular and subcortical white matter suggestive of chronic microvascular disease. Visualized paranasal sinuses are well aerated. Visualized mastoid air cells are well aerated. Right occipital scalp subcutaneous soft tissue swelling is seen. The calvarium is intact. Impression   No acute intracranial hemorrhage or mass effect. Right occipital scalp subcutaneous soft tissue swelling is seen.       8/8/2022  8:06 AM Michele, [facility] Incoming Radiant Results From [employer] Brii Moran APRN - CNP Results     No radiation information found for this patient  Narrative   EXAMINATION: CT of the brain without contrast       HISTORY: Traumatic injury of head. Laceration of scalp. COMPARISON: Brain CT July 25, 2022       TECHNIQUE: Multiple contiguous axial images were obtained of the brain from the skull base through the vertex. Multiplanar reformats were obtained. FINDINGS:       Prominence of the sulci and ventricles compatible with mild generalized parenchymal volume loss. Areas of bilateral supratentorial white matter hypoattenuation are nonspecific but most likely related to chronic small vessel ischemic changes in a patient    of this age. Gray-white matter differentiation is preserved. No acute hemorrhage or abnormal extra-axial fluid collection. No mass effect or midline shift. The visualized paranasal sinuses and mastoid air cells are clear. Posterior scalp edema. Calvarium is intact. Impression       No acute intracranial process. All CT scans at this facility use dose modulation, iterative reconstruction, and/or weight based dosing when appropriate to reduce radiation dose to as low as reasonably achievable. Assessment & Plan   Tamica Shaffer was seen today for 6 month follow-up, depression and health maintenance. Diagnoses and all orders for this visit:    Post concussion syndrome    Intractable post-traumatic headache, unspecified chronicity pattern    Major depressive disorder, recurrent, mild    Screening for lipid disorders  -     Lipid Panel; Future  -     Comprehensive Metabolic Panel; Future    Orders Placed This Encounter   Procedures    Lipid Panel     Standing Status:   Future     Number of Occurrences:   1     Standing Expiration Date:   11/3/2023     Order Specific Question:   Is Patient Fasting?/# of Hours     Answer:   0     Order Specific Question:   Has the patient fasted?      Answer:   No    Comprehensive Metabolic Panel     Standing Status:   Future     Number of Occurrences:   1     Standing Expiration Date:   11/3/2023     No orders of the defined types were placed in this encounter. Medications Discontinued During This Encounter   Medication Reason    diazePAM (VALIUM) 5 MG tablet Therapy completed     No follow-ups on file. Reviewed with the patient: current clinical status, medications, activities and diet. Side effects, adverse effects of the medication prescribed today, as well as treatment plan/ rationale and result expectations have been discussed with the patient who expresses understanding and desires to proceed. Close follow up to evaluate treatment results and for coordination of care. I have reviewed the patient's medical history in detail and updated the computerized patient record.     Sarina Shone, NINO - CNP

## 2022-11-14 NOTE — PROGRESS NOTES
[facility]  Outpatient Physical Therapy    Treatment Note        Date: 2022  Patient: Mady Pino  : 1950   Confirmed: Yes  MRN: [id number]  Referring Provider: Imani Ricketts MD    Medical Diagnosis: Strain of muscle, fascia and tendon at neck level, initial encounter [S16. 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]       Treatment Diagnosis: impaired balance, impaired gait, decreased cervical AROM    Visit Information:  Insurance: Payor: [facility] MEDICARE / Plan: [facility] / Product Type: *No Product type* /   PT Visit Information  Onset Date: 22  PT Insurance Information: [facility] Medicare  Total # of Visits Approved:  (BMN, $20 copay)  Total # of Visits to Date: 12  Plan of Care/Certification Expiration Date: 23  No Show: 1  Progress Note Due Date: 22  Canceled Appointment: 0  Progress Note Counter:     Subjective Information:  Subjective: Patient reports she was having trouble painting ornaments for increased time. Reports less dizziness with eye exercises.   HEP Compliance:  [x] Good [] Fair [] Poor [] Reports not doing due to:    Pain Screening  Patient Currently in Pain: Yes  Pain Assessment: 0-10  Pain Level: 5  Pain Location: Head, Neck  Pain Descriptors: Burning, Aching    Treatment:  Exercises:  Exercises  Exercise 1: supine cervical rotation x 10  Exercise 2: attempted chin tucks supine but had increased pain  Exercise 6: Foam: FA with head turns (Horizontal), static balance, NBOS  Exercise 9: Abnormal convergence >12 in from nose, convergence with 3 spots on paper with difficulty  Exercise 10: Gait drills: march and , tandem, retro, lateral unsupported  Exercise 20: HEP: continue with current       Manual:   Manual Therapy  Soft Tissue Mobilizaton: STM to cervical muscles and bilateral upper trap x 6 minutes  Other: IDN consent signed and educated on benefits; IDN performed to decrease cervical tightness; see paper chart for points (to be scanned upon D/C); total manual: 10 minutes       Modalities:  Moist Heat (CPT 02378)  Patient Position: Seated  Number Minutes Moist Heat: 10  Moist heat location: Cervical  Post treatment skin assessment: Redness - no adverse reaction       *Indicates exercise, modality, or manual techniques to be initiated when appropriate    Objective Measures:        Strength: [x] NT  [] MMT completed:           ROM: [x] NT  [] ROM measurements:         Assessment: Body Structures, Functions, Activity Limitations Requiring Skilled Therapeutic Intervention: Decreased ROM, Decreased strength, Decreased endurance, Increased pain, Decreased posture, Decreased balance, Decreased functional mobility   Assessment: Patient demonstrates fatigue during session, needing multiple rest breaks. IDN for cervical region this date to decrease tightness and improve ROM. Patient continues to have difficulty with convergence this date. Continue per POC. Treatment Diagnosis: impaired balance, impaired gait, decreased cervical AROM             Post-Pain Assessment:       Pain Rating (0-10 pain scale):   5/10   Location and pain description same as pre-treatment unless indicated. Action: [] NA   [] Perform HEP  [x] Meds as prescribed  [x] Modalities as prescribed   [] Call Physician     GOALS   Patient Goal(s): Patient Goals : \"not to have headaches and dizziness\"    Short Term Goals Completed by 4 weeks Goal Status   STG 1 Patient will report 25% reduction in dizziness symptoms. Met   STG 2 Patient will ambulate 150ft independently with improved bilateral step length and symmetry. Met   STG 3 Patient will report </= 1/10 pain in neck with movement for improved functional tolerance.  In progress, Partially met   STG 4 Patient will demonstrates smooth pursuits WF for improved tracking. In progress     Long Term Goals Completed by 6 weeks Goal Status   LTG 1 Patient will increase cervical AROM >/= 10-15 degrees each direction for improved functional tolerance. In progress, Partially met   LTG 2 Pt be able to ambulate in a busy environment with scanning with minimal to no deviations and minimal dizziness reported. In progress   LTG 3 DGI >/= 21/24 to reduce pt's risk for falls. In progress     Plan:  Frequency/Duration:  Plan  Plan Frequency: 1-2  Plan weeks: 6  Current Treatment Recommendations: Strengthening, ROM, Endurance training, Functional mobility training, Gait training, Neuromuscular re-education, Manual Therapy - Soft Tissue Mobilization, Manual Therapy - Joint Manipulation, Pain management, Home exercise program, Patient/Caregiver education & training, Modalities, Vestibular rehab, Integrated dry needling  Pt to continue current HEP. See objective section for any therapeutic exercise changes, additions or modifications this date.     Therapy Time:      PT Individual Minutes  Time In: 0908  Time Out: 1000  Minutes: 52  Timed Code Treatment Minutes: 42 Minutes  Procedure Minutes:10 minutes hot pack  Timed Activity Minutes Units   Neuro re-ed 32 2   Manual  10 1     Electronically signed by Veronika Odonnell, PT on 11/14/22 at 12:41 PM EST

## 2022-11-14 NOTE — PROGRESS NOTES
Medicare Annual Wellness Visit    Deon Henderson is here for Medicare AWV    Assessment & Plan   Medicare annual wellness visit, subsequent    Recommendations for Preventive Services Due: see orders and patient instructions/AVS.  Recommended screening schedule for the next 5-10 years is provided to the patient in written form: see Patient Instructions/AVS.     Return for Medicare Annual Wellness Visit in 1 year. Subjective   The following acute and/or chronic problems were also addressed today:      Patient's complete Health Risk Assessment and screening values have been reviewed and are found in Flowsheets. The following problems were reviewed today and where indicated follow up appointments were made and/or referrals ordered.     Positive Risk Factor Screenings with Interventions:             General Health and ACP:  General  In general, how would you say your health is?: (!) Poor  In the past 7 days, have you experienced any of the following: New or Increased Pain, New or Increased Fatigue, Loneliness, Social Isolation, Stress or Anger?: No  Do you get the social and emotional support that you need?: Yes  Do you have a Living Will?: Yes    Advance Directives       Power of  Living Will ACP-Advance Directive ACP-Power of     Not on File Not on File Not on File Not on File          General Health Risk Interventions:  Poor self-assessment of health status: patient declines any further evaluation/treatment for this issue    Health Habits/Nutrition:  Physical Activity: Inactive    Days of Exercise per Week: 0 days    Minutes of Exercise per Session: 0 min     Have you lost any weight without trying in the past 3 months?: No  Body mass index: (!) 32.59  Have you seen the dentist within the past year?: Yes  Health Habits/Nutrition Interventions:  Nutritional issues:  patient is not ready to address his/her nutritional/weight issues at this time             Objective   Vitals:    11/03/22 1007   BP: 132/76   Pulse: 88   Weight: 184 lb (83.5 kg)   Height: 5' 3\" (1.6 m)      Body mass index is 32.59 kg/m². General Appearance: alert and oriented to person, place and time, well developed and well- nourished, in no acute distress  Skin: warm and dry, no rash or erythema  Head: normocephalic and atraumatic  Eyes: pupils equal, round, and reactive to light, extraocular eye movements intact, conjunctivae normal  ENT: tympanic membrane, external ear and ear canal normal bilaterally, nose without deformity, nasal mucosa and turbinates normal without polyps  Neck: supple and non-tender without mass, no thyromegaly or thyroid nodules, no cervical lymphadenopathy  Pulmonary/Chest: clear to auscultation bilaterally- no wheezes, rales or rhonchi, normal air movement, no respiratory distress  Cardiovascular: normal rate, regular rhythm, normal S1 and S2, no murmurs, rubs, clicks, or gallops, distal pulses intact, no carotid bruits  Abdomen: soft, non-tender, non-distended, normal bowel sounds, no masses or organomegaly  Extremities: no cyanosis, clubbing or edema  Musculoskeletal: normal range of motion, no joint swelling, deformity or tenderness  Neurologic: reflexes normal and symmetric, no cranial nerve deficit, gait, coordination and speech normal       Allergies   Allergen Reactions    Penicillins Hives     Prior to Visit Medications    Medication Sig Taking?  Authorizing Provider   buPROPion (WELLBUTRIN XL) 300 MG extended release tablet Take 1 tablet by mouth every morning  NINO Garcia CNP   PARoxetine (PAXIL) 10 MG tablet Take 1 tablet by mouth daily  NINO Garcia CNP   topiramate (TOPAMAX) 50 MG tablet TAKE 1 TABLET BY MOUTH EVERY NIGHT  NINO Garcia CNP   meloxicam (MOBIC) 15 MG tablet Take 15 mg by mouth daily  Historical Provider, MD Hawk (Including outside providers/suppliers regularly involved in providing care):   Patient Care Team:  NINO Bowen CNP as PCP - General (Nurse Practitioner Family)  NINO Hobson - CNP as PCP - REHABILITATION [facility] Empaneled Provider     Reviewed and updated this visit:  Meds              Electronically signed by:  Vera Santos MSN, FNP-C 11/13/22 10:12 PM

## 2022-11-16 ENCOUNTER — HOSPITAL ENCOUNTER (OUTPATIENT)
Dept: PHYSICAL THERAPY | Age: 72
Setting detail: THERAPIES SERIES
Discharge: HOME | End: 2022-11-16
Payer: MEDICARE

## 2022-11-16 PROCEDURE — 97112 NEUROMUSCULAR REEDUCATION: CPT

## 2022-11-16 NOTE — PROGRESS NOTES
[facility]  Outpatient Physical Therapy    Treatment Note        Date: 2022  Patient: Shellie Medellin  : 1950   Confirmed: Yes  MRN: [id number]  Referring Provider: Kelvin Cooper MD    Medical Diagnosis: Strain of muscle, fascia and tendon at neck level, initial encounter [S16. 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]       Treatment Diagnosis: impaired balance, impaired gait, decreased cervical AROM    Visit Information:  Insurance: Payor: [facility] MEDICARE / Plan: [employer] Gue[employer]a / Product Type: *No Product type* /   PT Visit Information  Onset Date: 22  PT Insurance Information: [facility] Medicare  Total # of Visits to Date: 16  Plan of Care/Certification Expiration Date: 23  No Show: 1  Progress Note Due Date: 22  Canceled Appointment: 0  Progress Note Counter:     Subjective Information:  Subjective: Patient reports she had acupuncture done on Monday and experienced increased pain and tiredness following. Patient states she had some relief following . Patient stated she has been using adult coloring book and go 5-6 minutes before needing to stop.   HEP Compliance:  [x] Good [] Fair [] Poor [] Reports not doing due to:    Pain Screening  Patient Currently in Pain: No    Treatment:  Exercises:  Exercises  Exercise 1: supine cervical rotation x 10 5' hold  Exercise 2: chin tucks supine increased tightness during flexion noted  Exercise 6: Foam: FA with head turns (Horizontal), static balance, NBOS  Exercise 9: Abnormal convergence >12 in from nose, convergence with 3 spots on paper with difficulty  Exercise 10: Gait drills: march and , tandem, retro, lateral unsupported  Exercise 13: head turns in hallway- identifying numbers and letters on wall while ambulating- difficulty  Exercise 15: 4 square stepping- LOB with diagnoals  Exercise 20: HEP: continue with current       Assessment: Body Structures, Functions, Activity Limitations Requiring Skilled Therapeutic Intervention: Decreased ROM, Decreased strength, Decreased endurance, Increased pain, Decreased posture, Decreased balance, Decreased functional mobility   Assessment: Patient challenged this visit with head turns during task of walking and identifing taped numbers/letters at different heights. 1-2 LOB noted during activity but patient able to self correct. Demonstrated difficulty with tandem standing this visit. Patient continues to have tightness and pain during cervical ROM. Required increased rest breaks dt fatigue and dizziness throughout session. Patient will continue to benefit from skilled physical therapy to continue progressing towards goals per plan of care. Treatment Diagnosis: impaired balance, impaired gait, decreased cervical AROM  Therapy Prognosis: Good     Post-Pain Assessment:       Pain Rating (0-10 pain scale):   0/10   Location and pain description same as pre-treatment unless indicated. Action: [] NA   [x] Perform HEP  [] Meds as prescribed  [] Modalities as prescribed   [] Call Physician     GOALS   Patient Goal(s): Patient Goals : \"not to have headaches and dizziness\"    Short Term Goals Completed by 4 weeks Goal Status   STG 1 Patient will report 25% reduction in dizziness symptoms. Met   STG 2 Patient will ambulate 150ft independently with improved bilateral step length and symmetry. Met   STG 3 Patient will report </= 1/10 pain in neck with movement for improved functional tolerance. In progress, Partially met   STG 4 Patient will demonstrates smooth pursuits WFLs for improved tracking. In progress       Long Term Goals Completed by 6 weeks Goal Status   LTG 1 Patient will increase cervical AROM >/= 10-15 degrees each direction for improved functional tolerance.  In progress, Partially met   LTG 2 Pt be able to ambulate in a busy environment with scanning with minimal to no deviations and minimal dizziness reported. In progress   LTG 3 DGI >/= 21/24 to reduce pt's risk for falls. In progress          Plan:  Frequency/Duration:  Plan  Plan Frequency: 1-2  Plan weeks: 6  Current Treatment Recommendations: Strengthening, ROM, Endurance training, Functional mobility training, Gait training, Neuromuscular re-education, Manual Therapy - Soft Tissue Mobilization, Manual Therapy - Joint Manipulation, Pain management, Home exercise program, Patient/Caregiver education & training, Modalities, Vestibular rehab, Integrated dry needling  Pt to continue current HEP. See objective section for any therapeutic exercise changes, additions or modifications this date.     Therapy Time:   PT Individual Minutes  Time In: 2673  Time Out: 1000  Minutes: 52  Timed Code Treatment Minutes: 50 Minutes    Procedure Minutes:0  Timed Activity Minutes Units   Neuro  50 3     Electronically signed by Arlyn Andres PTA on 11/16/22 at 9:29 AM EST

## 2022-11-21 ENCOUNTER — HOSPITAL ENCOUNTER (OUTPATIENT)
Dept: PHYSICAL THERAPY | Age: 72
Setting detail: THERAPIES SERIES
Discharge: HOME | End: 2022-11-21
Payer: MEDICARE

## 2022-11-21 PROCEDURE — 97112 NEUROMUSCULAR REEDUCATION: CPT

## 2022-11-21 ASSESSMENT — PAIN SCALES - GENERAL: PAINLEVEL_OUTOF10: 0

## 2022-11-23 ENCOUNTER — HOSPITAL ENCOUNTER (OUTPATIENT)
Dept: PHYSICAL THERAPY | Age: 72
Setting detail: THERAPIES SERIES
Discharge: HOME | End: 2022-11-23
Payer: MEDICARE

## 2022-11-23 PROCEDURE — 97112 NEUROMUSCULAR REEDUCATION: CPT

## 2022-11-23 NOTE — PROGRESS NOTES
[facility]  Outpatient Physical Therapy    Treatment Note        Date: 2022  Patient: Derrick Godoy  : 1950   Confirmed: Yes  MRN: [id number]  Referring Provider: David Tyler MD    Medical Diagnosis: Strain of muscle, fascia and tendon at neck level, initial encounter [S16. 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 Returns to Neurologist in Feb. Reports she is functioning at 50%, feels fatigues easily but has improved. Received a body massage, felt alot better. Did not have a massage on her head or neck.   HEP Compliance:  [x] Good [] Fair [] Poor [] Reports not doing due to:    Pain Screening  Patient Currently in Pain: No    Treatment:  Exercises:  Exercises  Exercise 4: smooth pursuits standing 20sec x2, Horizontal signficantly difficulty vs vertical  Exercise 6: Foam: weightshifts with categorizing, anna, LOS, eyes closed 10sec x2  Exercise 8: Beads on a string: smooth pursuits while placing beads; sequencing with therapist calling out which color with smooth pursuits  Exercise 17: thumb push ups x 10 (one eye covered at a time), unable to complete x10 using Lt due to shooting pain; convergence x10 with improving distance however not [facility]         *Indicates exercise, modality, or manual techniques to be initiated when appropriate    Objective Measures:            Assessment: Body Structures, Functions, Activity Limitations Requiring Skilled Therapeutic Intervention: Decreased ROM, Decreased strength, Decreased endurance, Increased pain, Decreased posture, Decreased balance, Decreased functional mobility   Assessment: Continued oculomotor exercises in standing to challenge stability, patient significantly challenged wtih horizontal vs vertical. Trialed convergence with focus on unilateral eye movement to improve convergence and tolerance, patient unable to complete due to shooting pain. Fair abilities with categorizing and dual tasking activities. Increased fatigue at end of session. Treatment Diagnosis: impaired balance, impaired gait, decreased cervical AROM             Post-Pain Assessment:       Pain Rating (0-10 pain scale):  0 /10 - fatigue  Location and pain description same as pre-treatment unless indicated. Action: [] NA   [] Perform HEP  [] Meds as prescribed  [] Modalities as prescribed   [] Call Physician     GOALS   Patient Goal(s): Patient Goals : \"not to have headaches and dizziness\"    Short Term Goals Completed by 4 weeks Goal Status   STG 1 Patient will report 25% reduction in dizziness symptoms. Met   STG 2 Patient will ambulate 150ft independently with improved bilateral step length and symmetry. Met   STG 3 Patient will report </= 1/10 pain in neck with movement for improved functional tolerance. In progress, Partially met   STG 4 Patient will demonstrates smooth pursuits WFLs for improved tracking. In progress     Long Term Goals Completed by 6 weeks Goal Status   LTG 1 Patient will increase cervical AROM >/= 10-15 degrees each direction for improved functional tolerance.  In progress, Partially met   LTG 2 Pt be able to ambulate in a busy environment with scanning with minimal to no deviations and minimal dizziness reported. In progress   LTG 3 DGI >/= 21/24 to reduce pt's risk for falls. In progress       Plan:  Frequency/Duration:  Plan  Plan Frequency: 1-2  Plan weeks: 6  Current Treatment Recommendations: Strengthening, ROM, Endurance training, Functional mobility training, Gait training, Neuromuscular re-education, Manual Therapy - Soft Tissue Mobilization, Manual Therapy - Joint Manipulation, Pain management, Home exercise program, Patient/Caregiver education & training, Modalities, Vestibular rehab, Integrated dry needling  Pt to continue current HEP. See objective section for any therapeutic exercise changes, additions or modifications this date.     Therapy Time:      PT Individual Minutes  Time In: 0929  Time Out: 0973  Minutes: 53  Timed Code Treatment Minutes: 53 Minutes  Procedure Minutes:0  Timed Activity Minutes Units   Neuro 53 4     Electronically signed by Alis Lindsey PTA on 11/23/22 at 9:12 AM EST

## 2022-11-28 ENCOUNTER — HOSPITAL ENCOUNTER (OUTPATIENT)
Dept: PHYSICAL THERAPY | Age: 72
Setting detail: THERAPIES SERIES
Discharge: HOME | End: 2022-11-28
Payer: MEDICARE

## 2022-11-28 PROCEDURE — 97112 NEUROMUSCULAR REEDUCATION: CPT

## 2022-11-28 NOTE — PROGRESS NOTES
[facility]  Outpatient Physical Therapy    Treatment Note        Date: 2022  Patient: Anish Rocha  : 1950   Confirmed: Yes  MRN: [id number]  Referring Provider: Sumeet Burrell MD    Medical Diagnosis: Strain of muscle, fascia and tendon at neck level, initial encounter [S16. 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]       Treatment Diagnosis: impaired balance, impaired gait, decreased cervical AROM    Visit Information:  Insurance: Payor: [facility] MEDICARE / Plan: Daria Arguello / Product Type: *No Product type* /   PT Visit Information  Onset Date: 22  PT Insurance Information: [facility] Medicare  Total # of Visits to Date: 21  Plan of Care/Certification Expiration Date: 23  No Show: 1  Progress Note Due Date: 22  Canceled Appointment: 0  Progress Note Counter:     Subjective Information:  Subjective: Patient reports falling over the weekend after standing outside cleaning her flower bed. Fell backwards onto glutes, denied hitting her head but has a headache ever since. Was able to get up on her own, presents with LB and hip soreness today.   HEP Compliance:  [x] Good [] Fair [] Poor [] Reports not doing due to:    Pain Screening  Patient Currently in Pain: Yes    Treatment:  Exercises:  Exercises  Exercise 4: smooth pursuits standing 20sec x2, Horizontal (4/5 dizziness) signficantly difficulty vs vertical  Exercise 6: Foam: weightshifts, head turns, LOS  Exercise 8: Beads on a string: smooth pursuits while placing beads; sequencing with therapist calling out which color with smooth pursuits  Exercise 17: thumb push ups x 10 (one eye covered at a time), unable to complete x10 using Rt due to shooting pain & dizziness; convergence x10 with improving distance however not WFL            *Indicates exercise, modality, or manual techniques to be initiated when appropriate    Objective Measures:          Assessment: Body Structures, Functions, Activity Limitations Requiring Skilled Therapeutic Intervention: Decreased ROM, Decreased strength, Decreased endurance, Increased pain, Decreased posture, Decreased balance, Decreased functional mobility   Assessment: Patient presenting to PT after a busy weekend and after falling. Increased difficulty with standing smooth pursuits, unable to tolerate 3rd set. 1 LOB displayed with turning to look at therapist requiring Min A to correct. Patient inquiring about decreasing PT to 1x a week. Recommended patient continuing 2x a week to continue with higher level oculomotor exercises as well as balance as patient is progressing well however is limited at times. Treatment Diagnosis: impaired balance, impaired gait, decreased cervical AROM             Post-Pain Assessment:       Pain Rating (0-10 pain scale):   0/10 -fatigue  Location and pain description same as pre-treatment unless indicated. Action: [x] NA   [] Perform HEP  [] Meds as prescribed  [] Modalities as prescribed   [] Call Physician     GOALS   Patient Goal(s): Patient Goals : \"not to have headaches and dizziness\"    Short Term Goals Completed by 4 weeks Goal Status   STG 1 Patient will report 25% reduction in dizziness symptoms. Met   STG 2 Patient will ambulate 150ft independently with improved bilateral step length and symmetry. Met   STG 3 Patient will report </= 1/10 pain in neck with movement for improved functional tolerance. In progress, Partially met   STG 4 Patient will demonstrates smooth pursuits WFLs for improved tracking. In progress     Long Term Goals Completed by 6 weeks Goal Status   LTG 1 Patient will increase cervical AROM >/= 10-15 degrees each direction for improved functional tolerance.  In progress, Partially met   LTG 2 Pt be able to ambulate in a busy environment with scanning with minimal to no deviations and minimal dizziness reported. In progress   LTG 3 DGI >/= 21/24 to reduce pt's risk for falls. In progress       Plan:  Frequency/Duration:  Plan  Plan Frequency: 1-2  Plan weeks: 6  Current Treatment Recommendations: Strengthening, ROM, Endurance training, Functional mobility training, Gait training, Neuromuscular re-education, Manual Therapy - Soft Tissue Mobilization, Manual Therapy - Joint Manipulation, Pain management, Home exercise program, Patient/Caregiver education & training, Modalities, Vestibular rehab, Integrated dry needling  Pt to continue current HEP. See objective section for any therapeutic exercise changes, additions or modifications this date.     Therapy Time:      PT Individual Minutes  Time In: 6221  Time Out: 1000  Minutes: 53  Timed Code Treatment Minutes: 53 Minutes  Procedure Minutes:0  Timed Activity Minutes Units   Neuro 53 4     Electronically signed by Raúl Schrader PTA on 11/28/22 at 7:57 AM EST

## 2022-12-02 ENCOUNTER — OFFICE VISIT (OUTPATIENT)
Dept: FAMILY MEDICINE CLINIC | Age: 72
End: 2022-12-02

## 2022-12-02 ENCOUNTER — NURSE TRIAGE (OUTPATIENT)
Dept: OTHER | Facility: CLINIC | Age: 72
End: 2022-12-02

## 2022-12-02 ENCOUNTER — HOSPITAL ENCOUNTER (OUTPATIENT)
Dept: PHYSICAL THERAPY | Age: 72
Setting detail: THERAPIES SERIES
Discharge: HOME | End: 2022-12-02
Payer: MEDICARE

## 2022-12-02 VITALS
WEIGHT: 184 LBS | SYSTOLIC BLOOD PRESSURE: 132 MMHG | HEART RATE: 66 BPM | TEMPERATURE: 97.3 F | DIASTOLIC BLOOD PRESSURE: 70 MMHG | HEIGHT: 63 IN | OXYGEN SATURATION: 98 % | BODY MASS INDEX: 32.6 KG/M2

## 2022-12-02 DIAGNOSIS — L03.116 CELLULITIS AND ABSCESS OF LEFT LEG: Primary | ICD-10-CM
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
## 2022-12-02 DIAGNOSIS — S80.862A INSECT BITE OF LEFT LOWER LEG, INITIAL ENCOUNTER: ICD-10-CM

## 2022-12-02 DIAGNOSIS — L02.416 CELLULITIS AND ABSCESS OF LEFT LEG: Primary | ICD-10-CM

## 2022-12-02 PROCEDURE — 97140 MANUAL THERAPY 1/> REGIONS: CPT

## 2022-12-02 PROCEDURE — 97112 NEUROMUSCULAR REEDUCATION: CPT

## 2022-12-02 RX ORDER — CEPHALEXIN 500 MG/1
500 CAPSULE ORAL 4 TIMES DAILY
Qty: 40 CAPSULE | Refills: 0 | Status: SHIPPED | OUTPATIENT
Start: 2022-12-02 | End: 2022-12-12

## 2022-12-02 RX ORDER — SULFAMETHOXAZOLE AND TRIMETHOPRIM 800; 160 MG/1; MG/1
1 TABLET ORAL 2 TIMES DAILY
Qty: 20 TABLET | Refills: 0 | Status: SHIPPED | OUTPATIENT
Start: 2022-12-02 | End: 2022-12-12

## 2022-12-02 ASSESSMENT — ENCOUNTER SYMPTOMS
COUGH: 0
SINUS PRESSURE: 0
TROUBLE SWALLOWING: 0
BACK PAIN: 0
SHORTNESS OF BREATH: 0
ABDOMINAL PAIN: 0
CHEST TIGHTNESS: 0
DIARRHEA: 0
VOMITING: 0

## 2022-12-02 ASSESSMENT — PAIN SCALES - GENERAL: PAINLEVEL_OUTOF10: 2

## 2022-12-02 ASSESSMENT — PAIN DESCRIPTION - LOCATION: LOCATION: HEAD

## 2022-12-02 ASSESSMENT — PAIN DESCRIPTION - DESCRIPTORS: DESCRIPTORS: ACHING

## 2022-12-02 NOTE — PROGRESS NOTES
[facility]  Outpatient Physical Therapy    Treatment Note        Date: 2022  Patient: Danielle Song  : 1950   Confirmed: Yes  MRN: [id number]  Referring Provider: Jojo Polk MD    Medical Diagnosis: Strain of muscle, fascia and tendon at neck level, initial encounter [S16. 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 Patient reports she has to wear sunglasses to watch TV.   HEP Compliance:  [x] Good [] Fair [] Poor [] Reports not doing due to:    Pain Screening  Patient Currently in Pain: Yes  Pain Assessment: 0-10  Pain Level: 2  Pain Location: Head  Pain Descriptors: Aching    Treatment:  Exercises:  Exercises  Exercise 1: seated cervical SB, rotation x 10  Exercise 4: smooth pursuits standing  Exercise 6: foam: balance, weightshift, toe taps, NBOS  Exercise 8: Beads on a string: smooth pursuits while placing beads; sequencing with therapist calling out which color with smooth pursuits  Exercise 12: figure 8 walking around objects, obstacle course  Exercise 20: HEP: continue with current       Manual:   Manual Therapy  Other: cupping on bilateral upper trap static and gliding (lite pressure on left side compared to right) total manual: 8 minutes       Modalities:  Moist Heat (CPT 56944)  Patient Position: Seated  Number Minutes Moist Heat: 10  Moist heat location: Cervical  Post treatment skin assessment: Redness - no adverse reaction       *Indicates exercise, modality, or manual techniques to be initiated when appropriate    Objective Measures:          Strength: [x] NT  [] MMT completed:           ROM: [x] NT  [] ROM measurements:             Assessment: Body Structures, Functions, Activity Limitations Requiring Skilled Therapeutic Intervention: Decreased ROM, Decreased strength, Decreased endurance, Increased pain, Decreased posture, Decreased balance, Decreased functional mobility   Assessment: Patient reports some relief in bilateral upper trap with cupping. Patient demonstrates improved tolerance with seated eye exercises but continues to have trouble with standing eye exercises. Continue per POC. Treatment Diagnosis: impaired balance, impaired gait, decreased cervical AROM             Post-Pain Assessment:       Pain Rating (0-10 pain scale):   0/10   Location and pain description same as pre-treatment unless indicated. Action: [] NA   [] Perform HEP  [] Meds as prescribed  [] Modalities as prescribed   [] Call Physician     GOALS   Patient Goal(s): Patient Goals : \"not to have headaches and dizziness\"    Short Term Goals Completed by 4 weeks Goal Status   STG 1 Patient will report 25% reduction in dizziness symptoms. Met   STG 2 Patient will ambulate 150ft independently with improved bilateral step length and symmetry. Met   STG 3 Patient will report </= 1/10 pain in neck with movement for improved functional tolerance. In progress, Partially met   STG 4 Patient will demonstrates smooth pursuits WFLs for improved tracking. In progress     Long Term Goals Completed by 6 weeks Goal Status   LTG 1 Patient will increase cervical AROM >/= 10-15 degrees each direction for improved functional tolerance.  In progress, Partially met   LTG 2 Pt be able to ambulate in a busy environment with scanning with minimal to no deviations and minimal dizziness reported. In progress   LTG 3 DGI >/= 21/24 to reduce pt's risk for falls. In progress       Plan:  Frequency/Duration:  Plan  Plan Frequency: 1-2  Plan weeks: 6  Current Treatment Recommendations: Strengthening, ROM, Endurance training, Functional mobility training, Gait training, Neuromuscular re-education, Patient/Caregiver education & training, Equipment evaluation, education, & procurement, Safety education & training, Home exercise program, Modalities, Vestibular rehab, Dry needling, Manual  Modalities: Heat/Cold  Pt to continue current HEP. See objective section for any therapeutic exercise changes, additions or modifications this date.     Therapy Time:      PT Individual Minutes  Time In: 1110  Time Out: 1200  Minutes: 50  Timed Code Treatment Minutes: 40 Minutes  Procedure Minutes:10 minutes hot pack  Timed Activity Minutes Units   Neuro re-ed 32 2   Manual  8 1     Electronically signed by Sue Batista PT on 12/2/22 at 12:33 PM EST

## 2022-12-02 NOTE — TELEPHONE ENCOUNTER
Received call from Luis A at [facility] with [email or web address]. Subjective: Caller states \"I have a rash. \"     Current Symptoms: \"bites\" on right outer leg. Reports 5 spots. The spots are \"little\" per patient. The spots are \"hard and red\". Onset: 2 weeks ago; worsening    Associated Symptoms:     Pain Severity: 6/10; sharp; constant    Temperature: Denies    What has been tried: neosporin, ice    Recommended disposition: See in Office Today    Care advice provided, patient verbalizes understanding; denies any other questions or concerns; instructed to call back for any new or worsening symptoms. Patient/Caller agrees with recommended disposition; writer provided warm transfer to [employer] at [facility] for appointment scheduling    Attention Provider: Thank you for allowing me to participate in the care of your patient. The patient was connected to triage in response to information provided to the ECC/PSC. Please do not respond through this encounter as the response is not directed to a shared pool.     Reason for Disposition   Spreading redness around the boil and no fever    Protocols used: Boil (Skin Abscess)-ADULT-[place]

## 2022-12-02 NOTE — PROGRESS NOTES
Subjective:      Patient ID: Judy Cunha is a 67 y.o. female who presents today for:  Chief Complaint   Patient presents with    Insect Bite     On left leg. Started a week ago. Started spreading on the weekend. HPI  67year old female who presents with an infected insect bite on her left leg that started a week ago. Spread to 3 lesions    Past Medical History:   Diagnosis Date    Sinus infection      No past surgical history on file. Social History     Socioeconomic History    Marital status:      Spouse name: Not on file    Number of children: Not on file    Years of education: Not on file    Highest education level: Not on file   Occupational History    Not on file   Tobacco Use    Smoking status: Never    Smokeless tobacco: Never   Substance and Sexual Activity    Alcohol use: Yes    Drug use: No    Sexual activity: Never   Other Topics Concern    Not on file   Social History Narrative    Not on file     Social Determinants of Health     Financial Resource Strain: Low Risk     Difficulty of Paying Living Expenses: Not hard at all   Food Insecurity: No Food Insecurity    Worried About Running Out of Food in the Last Year: Never true    Ran Out of Food in the Last Year: Never true   Transportation Needs: Not on file   Physical Activity: Inactive    Days of Exercise per Week: 0 days    Minutes of Exercise per Session: 0 min   Stress: Not on file   Social Connections: Not on file   Intimate Partner Violence: Not on file   Housing Stability: Not on file     No family history on file.   Allergies   Allergen Reactions    Penicillins Hives     Current Outpatient Medications   Medication Sig Dispense Refill    cephALEXin (KEFLEX) 500 MG capsule Take 1 capsule by mouth 4 times daily for 10 days 40 capsule 0    sulfamethoxazole-trimethoprim (BACTRIM DS) 800-160 MG per tablet Take 1 tablet by mouth 2 times daily for 10 days 20 tablet 0    buPROPion (WELLBUTRIN XL) 300 MG extended release tablet Take 1 tablet by mouth every morning 90 tablet 2    PARoxetine (PAXIL) 10 MG tablet Take 1 tablet by mouth daily 90 tablet 3    topiramate (TOPAMAX) 50 MG tablet TAKE 1 TABLET BY MOUTH EVERY NIGHT 90 tablet 2    meloxicam (MOBIC) 15 MG tablet Take 15 mg by mouth daily       No current facility-administered medications for this visit. Review of Systems   Constitutional:  Negative for activity change, appetite change, chills, fever and unexpected weight change. HENT:  Negative for drooling, ear pain, nosebleeds, sinus pressure and trouble swallowing. Respiratory:  Negative for cough, chest tightness and shortness of breath. Cardiovascular:  Negative for chest pain and leg swelling. Gastrointestinal:  Negative for abdominal pain, diarrhea and vomiting. Endocrine: Negative for polydipsia and polyphagia. Genitourinary:  Negative for dysuria, flank pain and frequency. Musculoskeletal:  Negative for back pain and myalgias. Skin:  Positive for wound. Negative for pallor and rash. Neurological:  Negative for syncope, weakness and headaches. Hematological:  Does not bruise/bleed easily. Psychiatric/Behavioral:  Negative for agitation, behavioral problems and confusion. All other systems reviewed and are negative. Objective:   /70   Pulse 66   Temp 97.3 °F (36.3 °C) (Temporal)   Ht 5' 3\" (1.6 m)   Wt 184 lb (83.5 kg) Comment: from file  SpO2 98%   BMI 32.59 kg/m²     Physical Exam  Vitals and nursing note reviewed. Constitutional:       General: She is awake. She is not in acute distress. Appearance: Normal appearance. She is well-developed and normal weight. She is not ill-appearing, toxic-appearing or diaphoretic. Comments: No photophobia. No phonophobia. HENT:      Head: Normocephalic and atraumatic. No Strickland's sign. Right Ear: External ear normal.      Left Ear: External ear normal.      Nose: Nose normal. No congestion or rhinorrhea.       Mouth/Throat: Mouth: Mucous membranes are moist.      Pharynx: Oropharynx is clear. No oropharyngeal exudate or posterior oropharyngeal erythema. Eyes:      General: No scleral icterus. Right eye: No foreign body or discharge. Left eye: No discharge. Extraocular Movements: Extraocular movements intact. Conjunctiva/sclera: Conjunctivae normal.      Left eye: No exudate. Pupils: Pupils are equal, round, and reactive to light. Neck:      Vascular: No JVD. Trachea: No tracheal deviation. Comments: No meningismus. Cardiovascular:      Rate and Rhythm: Normal rate and regular rhythm. Heart sounds: Normal heart sounds. Heart sounds not distant. No murmur heard. No friction rub. No gallop. Pulmonary:      Effort: Pulmonary effort is normal. No respiratory distress. Breath sounds: Normal breath sounds. No stridor. No wheezing, rhonchi or rales. Chest:      Chest wall: No tenderness. Abdominal:      General: Abdomen is flat. Bowel sounds are normal. There is no distension. Palpations: Abdomen is soft. There is no mass. Tenderness: There is no abdominal tenderness. There is no right CVA tenderness, left CVA tenderness, guarding or rebound. Hernia: No hernia is present. Musculoskeletal:         General: No swelling, tenderness, deformity or signs of injury. Normal range of motion. Cervical back: Normal range of motion and neck supple. No rigidity. Lymphadenopathy:      Head:      Right side of head: No submental adenopathy. Left side of head: No submental adenopathy. Skin:     General: Skin is warm and dry. Capillary Refill: Capillary refill takes less than 2 seconds. Coloration: Skin is not jaundiced or pale. Findings: Wound present. No bruising, erythema, lesion or rash. Neurological:      General: No focal deficit present. Mental Status: She is alert and oriented to person, place, and time.  Mental status is at baseline. Cranial Nerves: No cranial nerve deficit. Sensory: No sensory deficit. Motor: No weakness. Coordination: Coordination normal.      Deep Tendon Reflexes: Reflexes are normal and symmetric. Psychiatric:         Mood and Affect: Mood normal.         Behavior: Behavior normal. Behavior is cooperative. Thought Content: Thought content normal.         Judgment: Judgment normal.       Assessment:       Diagnosis Orders   1. Cellulitis and abscess of left leg  cephALEXin (KEFLEX) 500 MG capsule    sulfamethoxazole-trimethoprim (BACTRIM DS) 800-160 MG per tablet      2. Insect bite of left lower leg, initial encounter  cephALEXin (KEFLEX) 500 MG capsule    sulfamethoxazole-trimethoprim (BACTRIM DS) 800-160 MG per tablet        No results found for this visit on 12/02/22. Plan:     Assessment & Plan   Kerrie Doyle was seen today for insect bite. Diagnoses and all orders for this visit:    Cellulitis and abscess of left leg  -     cephALEXin (KEFLEX) 500 MG capsule; Take 1 capsule by mouth 4 times daily for 10 days  -     sulfamethoxazole-trimethoprim (BACTRIM DS) 800-160 MG per tablet; Take 1 tablet by mouth 2 times daily for 10 days    Insect bite of left lower leg, initial encounter  -     cephALEXin (KEFLEX) 500 MG capsule; Take 1 capsule by mouth 4 times daily for 10 days  -     sulfamethoxazole-trimethoprim (BACTRIM DS) 800-160 MG per tablet; Take 1 tablet by mouth 2 times daily for 10 days    No orders of the defined types were placed in this encounter. Orders Placed This Encounter   Medications    cephALEXin (KEFLEX) 500 MG capsule     Sig: Take 1 capsule by mouth 4 times daily for 10 days     Dispense:  40 capsule     Refill:  0    sulfamethoxazole-trimethoprim (BACTRIM DS) 800-160 MG per tablet     Sig: Take 1 tablet by mouth 2 times daily for 10 days     Dispense:  20 tablet     Refill:  0     There are no discontinued medications.   Return if symptoms worsen or fail to improve. Reviewed with the patient/family: current clinical status & medications. Side effects of the medication prescribed today, as well as treatment plan/rationale and result expectations have been discussed with the patient/family who expresses understanding. Patient will be discharged home in stable condition. Follow up with PCP to evaluate treatment results or return if symptoms worsen or fail to improve. Discussed signs and symptoms which require immediate follow-up in ED/call to 911. Understanding verbalized. I have reviewed the patient's medical history in detail and updated the computerized patient record.     CHINMAY Santiago

## 2022-12-04 DIAGNOSIS — F07.81 POST CONCUSSION SYNDROME: Primary | ICD-10-CM

## 2022-12-04 DIAGNOSIS — R47.89 WORD FINDING DIFFICULTY: ICD-10-CM

## 2022-12-05 ENCOUNTER — HOSPITAL ENCOUNTER (OUTPATIENT)
Dept: PHYSICAL THERAPY | Age: 72
Setting detail: THERAPIES SERIES
Discharge: HOME | End: 2022-12-05
Payer: MEDICARE

## 2022-12-05 NOTE — PROGRESS NOTES
[address]       Physical Therapy  Cancellation/No-show Note  Patient Name:  Titi Lees  :  1950   Date:  2022          Visit Information:  PT Visit Information  Onset Date: 22  PT Insurance Information: [facility] Medicare  Total # of Visits to Date: 21  Plan of Care/Certification Expiration Date: 23  No Show: 1  Progress Note Due Date: 22  Canceled Appointment: 1  Progress Note Counter:  Cx 22    For today's appointment patient:  [x]  Cancelled  []  Rescheduled appointment  []  No-show   []  Called pt to remind of next appointment     Reason given by patient:  []  Patient ill  [x]  Conflicting appointment  []  No transportation    []  Conflict with work  []  No reason given  []  Other:       Comments:       Signature: Electronically signed by Pritesh Sierra PTA on 22 at 8:20 AM EST

## 2022-12-08 ENCOUNTER — HOSPITAL ENCOUNTER (OUTPATIENT)
Dept: PHYSICAL THERAPY | Age: 72
Setting detail: THERAPIES SERIES
Discharge: HOME | End: 2022-12-08
Payer: MEDICARE

## 2022-12-08 PROCEDURE — 97112 NEUROMUSCULAR REEDUCATION: CPT

## 2022-12-08 ASSESSMENT — PAIN DESCRIPTION - LOCATION: LOCATION: HEAD

## 2022-12-08 ASSESSMENT — PAIN SCALES - GENERAL: PAINLEVEL_OUTOF10: 3

## 2022-12-08 NOTE — PROGRESS NOTES
[address]     Ph: [phone]  Fax: [phone]      [] Certification  [] Recertification []  Plan of Care  [x] Progress Note [] Discharge      Referring Provider: Jojo Polk MD     From: Elizabet Elmore, PT  Patient: Danielle Song (45 y.o. female) : 1950 Date: 2022  Medical Diagnosis: Strain of muscle, fascia and tendon at neck level, initial encounter [S16. 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 Patient reports decrease in dizziness by 50%. Reports occ dizziness with movement but is improving. Met   Short Term Goal 2: Patient will ambulate 150ft independently with improved bilateral step length and symmetry. Independent with improving step length and symmetry. Met   Short Term Goal 3: Patient will report </= 1/10 pain in neck with movement for improved functional tolerance. Reports 3/10 with neck movements. In progress, Partially met   Short Term Goal 4: Patient will demonstrates smooth pursuits WFLs for improved tracking. Fair tracking, increased dizziness in standing.   In progress     Long Term Goals - Time Frame for Long Term Goals : 6 weeks  Goals Current/ Discharge status Status   Long Term Goal 1: Patient will increase cervical AROM >/= 10-15 degrees each direction for improved functional tolerance. Cervical flexion 60, ext 40, Rt lateral SB 40 deg, Lt lateral SB 40deg, Rt rotation 40deg, Lt rotation 48deg   In progress, Partially met   Long Term Goal 2: Pt be able to ambulate in a busy environment with scanning with minimal to no deviations and minimal dizziness reported. No significant deviations, occ dizziness with increased fatigue. In progress   Long Term Goal 3: DGI >/= 21/24 to reduce pt's risk for falls. DGI: 18/21 In progress     Body Structures, Functions, Activity Limitations Requiring Skilled Therapeutic Intervention: Decreased ROM, Decreased strength, Decreased endurance, Increased pain, Decreased posture, Decreased balance, Decreased functional mobility   Assessment: Patient displaying improving cervical ROM in all planes. Decreased tolerance to horizontal smooth pursuits today, unable to tolerate more than 10sec in sitting. Improved tolerance to ambulating in busy environment, no significant deviations or reports of dizziness. Patient would benefit from continuing with PT to further improve oculomotor tolerance, balance and overall mobility. PLAN: [x] Continue with POC. Frequency/Duration:  Plan Frequency: 1-2  Plan weeks: 6  Current Treatment Recommendations: Strengthening, ROM, Endurance training, Functional mobility training, Gait training, Neuromuscular re-education, Patient/Caregiver education & training, Equipment evaluation, education, & procurement, Safety education & training, Home exercise program, Modalities, Vestibular rehab, Dry needling, Manual  Modalities: Heat/Cold                             Patient Status:[x] Continue/ Initiate plan of Care    [] Discharge PT. Recommend pt continue with HEP.      [] Additional visits requested, Please re-certify for additional visits:    [] Hold   Obj info by:  Electronically signed by Griselda Vasquez PTA on 12/8/2022 at 12:18 PM        Signature: Electronically signed by Jerilyn Osler, PT on 12/9/2022 at 4:05 PM        If you have any questions or concerns, please don't hesitate to call. Thank you for your referral.    I have reviewed this plan of care and certify a need for medically necessary rehabilitation services.     Physician Signature:__________________________________________________________  Date:  Please sign and return

## 2022-12-08 NOTE — PROGRESS NOTES
[facility]  Outpatient Physical Therapy    Treatment Note        Date: 2022  Patient: Damaso Paco  : 1950   Confirmed: Yes  MRN: [id number]  Referring Provider: Miguelito Espino MD    Medical Diagnosis: Strain of muscle, fascia and tendon at neck level, initial encounter [S16. 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]       Treatment Diagnosis: impaired balance, impaired gait, decreased cervical AROM    Visit Information:  Insurance: Payor: [facility] MEDICARE / Plan: Percy  / Product Type: *No Product type* /   PT Visit Information  Onset Date: 22  PT Insurance Information: [facility] Medicare  Total # of Visits to Date: 25  Plan of Care/Certification Expiration Date: 23  No Show: 1  Progress Note Due Date: 22  Canceled Appointment: 1  Progress Note Counter:     Subjective Information:  Subjective: Patient reports having an excellent day on Monday, was able to decorate her house, laundry, was able to get on and off the floor without difficulty. Felt bad on Tuesday but was not miserable. Patient reports having Cellulitis in Rt LE, is currently on two antibiotics.   HEP Compliance:  [x] Good [] Fair [] Poor [] Reports not doing due to:    Pain Screening  Patient Currently in Pain: Yes  Pain Level: 3  Pain Location: Head  Pain Descriptors:  (Sensitive)    Treatment:  Exercises:  Exercises  Exercise 4: Smooth pursuits in sitting: 3/5 dizziness horiz (unable to tolerate 30 sec today)  Exercise 5: Obj measures for PN: DGI            *Indicates exercise, modality, or manual techniques to be initiated when appropriate    Objective Measures:                  Ambulation  Device: No Device  Assistance: Supervision, Modified Independent  Quality of Gait: Good iban and stride length, no reports of dizziness  Distance: 2 laps track =528ft                    ROM: [] NT  [x] ROM measurements:            AROM Cervical Spine   Cervical flexion: 60  Cervical extension: 40  Cervical right lateral: 40  Cervical left lateral: 30  Cervical right rotation: 40  Cervical left rotation: 48                     Assessment: Body Structures, Functions, Activity Limitations Requiring Skilled Therapeutic Intervention: Decreased ROM, Decreased strength, Decreased endurance, Increased pain, Decreased posture, Decreased balance, Decreased functional mobility   Assessment: Patient displaying improving cervical ROM in all planes. Decreased tolerance to horizontal smooth pursuits today, unable to tolerate more than 10sec in sitting. Improved tolerance to ambulating in busy environment, no significant deviations or reports of dizziness. Patient would benefit from continuing with PT to further improve oculomotor tolerance, balance and overall mobility. Treatment Diagnosis: impaired balance, impaired gait, decreased cervical AROM             Post-Pain Assessment:       Pain Rating (0-10 pain scale):   3/10   Location and pain description same as pre-treatment unless indicated. Action: [] NA   [] Perform HEP  [x] Meds as prescribed  [] Modalities as prescribed   [] Call Physician     GOALS   Patient Goal(s): Patient Goals : \"not to have headaches and dizziness\"    Short Term Goals Completed by 4 weeks Goal Status   STG 1 Patient will report 25% reduction in dizziness symptoms. Met   STG 2 Patient will ambulate 150ft independently with improved bilateral step length and symmetry. Met   STG 3 Patient will report </= 1/10 pain in neck with movement for improved functional tolerance. In progress, Partially met   STG 4 Patient will demonstrates smooth pursuits WFLs for improved tracking.  In progress     Long Term Goals Completed by 6 weeks Goal Status   LTG 1 Patient will increase cervical AROM >/= 10-15 degrees each direction for improved functional tolerance. In progress, Partially met   LTG 2 Pt be able to ambulate in a busy environment with scanning with minimal to no deviations and minimal dizziness reported. In progress   LTG 3 DGI >/= 21/24 to reduce pt's risk for falls. In progress     Plan:  Frequency/Duration:  Plan  Plan Frequency: 1-2  Plan weeks: 6  Current Treatment Recommendations: Strengthening, ROM, Endurance training, Functional mobility training, Gait training, Neuromuscular re-education, Patient/Caregiver education & training, Equipment evaluation, education, & procurement, Safety education & training, Home exercise program, Modalities, Vestibular rehab, Dry needling, Manual  Modalities: Heat/Cold  Pt to continue current HEP. See objective section for any therapeutic exercise changes, additions or modifications this date.     Therapy Time:      PT Individual Minutes  Time In: 7125  Time Out: 1000  Minutes: 49  Timed Code Treatment Minutes: 49 Minutes  Procedure Minutes:0  Timed Activity Minutes Units   Neuro 49 3     Electronically signed by Yee Morales PTA on 12/8/22 at 9:00 AM EST

## 2022-12-12 ENCOUNTER — APPOINTMENT (OUTPATIENT)
Dept: PHYSICAL THERAPY | Age: 72
End: 2022-12-12
Payer: MEDICARE

## 2022-12-12 PROCEDURE — 97140 MANUAL THERAPY 1/> REGIONS: CPT

## 2022-12-12 PROCEDURE — 97112 NEUROMUSCULAR REEDUCATION: CPT

## 2022-12-12 NOTE — PROGRESS NOTES
[facility]  Outpatient Physical Therapy    Treatment Note        Date: 2022  Patient: Anish Rocha  : 1950   Confirmed: Yes  MRN: [id number]  Referring Provider: Sumeet Burrell MD    Medical Diagnosis: Strain of muscle, fascia and tendon at neck level, initial encounter [S16. 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]       Treatment Diagnosis: impaired balance, impaired gait, decreased cervical AROM    Visit Information:  Insurance: Payor: [facility] MEDICARE / Plan: Daira Arguello / Product Type: *No Product type* /   PT Visit Information  Onset Date: 22  PT Insurance Information: [facility] Medicare  Total # of Visits to Date: 21  Plan of Care/Certification Expiration Date: 23  No Show: 1  Progress Note Due Date: 23  Canceled Appointment: 1  Progress Note Counter:     Subjective Information:  Subjective: Pt states she feels good and her eyes still are sensitive to light .  \" Im seeing things differently in a good way\"    HEP Compliance:  [x] Good [] Fair [] Poor [] Reports not doing due to:         Treatment:  Exercises:  Exercises  Exercise 4: Smooth pursuits in sitting: Horiz 30s x 2( no Dizziness)Vert  Exercise 10: Dgi head turns and turning 360  Exercise 12: figure 8 walking around objects  Exercise 14: ambulation around track, attempted up/down head turns but needs CGA to avoid LOB  Exercise 15: 4 square stepping with spotting head turns       Manual:   Manual Therapy  Other: cupping on bilateral upper trap  gliding total manual: 8 minutes       Modalities:NA          *Indicates exercise, modality, or manual techniques to be initiated when appropriate    Objective Measures:    Strength: [x] NT  [] MMT completed:     ROM: [x] NT  [] ROM measurements: Assessment: Body Structures, Functions, Activity Limitations Requiring Skilled Therapeutic Intervention: Decreased ROM, Decreased strength, Decreased endurance, Increased pain, Decreased posture, Decreased balance, Decreased functional mobility   Assessment: Pt with increased dizziness  with vertical head turns with imbnalance noted in clinic and when on track. Increased imbalance also noted with turns around corneers of track with head turns. Trialed smooth pursuits on foam with increased symptoms in the vertical plane, reduced on second attempt. Pt off balance afterwards when stepping off foam and then turning 90 degs, UE support to correct. Pt reports no tightness after  manual and no dizzineess after session. Treatment Diagnosis: impaired balance, impaired gait, decreased cervical AROM             Post-Pain Assessment:       Pain Rating (0-10 pain scale):   0/10   Location and pain description same as pre-treatment unless indicated. Action: [x] NA   [] Perform HEP  [] Meds as prescribed  [] Modalities as prescribed   [] Call Physician     GOALS   Patient Goal(s): Patient Goals : \"not to have headaches and dizziness\"    Short Term Goals Completed by 4 weeks Goal Status   STG 1 Patient will report 25% reduction in dizziness symptoms. Met   STG 2 Patient will ambulate 150ft independently with improved bilateral step length and symmetry. Met   STG 3 Patient will report </= 1/10 pain in neck with movement for improved functional tolerance. In progress, Partially met   STG 4 Patient will demonstrates smooth pursuits WFLs for improved tracking. In progress     Long Term Goals Completed by 6 weeks Goal Status   LTG 1 Patient will increase cervical AROM >/= 10-15 degrees each direction for improved functional tolerance. In progress, Partially met   LTG 2 Pt be able to ambulate in a busy environment with scanning with minimal to no deviations and minimal dizziness reported.  In progress   LTG 3 DGI >/= 21/24 to reduce pt's risk for falls. In progress       Plan:  Frequency/Duration:  Plan  Plan Frequency: 1-2  Plan weeks: 6  Current Treatment Recommendations: Strengthening, ROM, Endurance training, Functional mobility training, Gait training, Neuromuscular re-education, Patient/Caregiver education & training, Equipment evaluation, education, & procurement, Safety education & training, Home exercise program, Modalities, Vestibular rehab, Dry needling, Manual  Modalities: Heat/Cold  Pt to continue current HEP. See objective section for any therapeutic exercise changes, additions or modifications this date.     Therapy Time:      PT Individual Minutes  Time In: 9237  Time Out: 1000  Minutes: 48  Timed Code Treatment Minutes: 48 Minutes  Procedure Minutes:0  Timed Activity Minutes Units   Neuro 40 2   Manual  8 1     Electronically signed by Maureen Spaulding PTA on 12/12/22 at 11:21 AM EST

## 2022-12-13 ENCOUNTER — HOSPITAL ENCOUNTER (OUTPATIENT)
Dept: SPEECH THERAPY | Age: 72
Setting detail: THERAPIES SERIES
End: 2022-12-13
Payer: MEDICARE

## 2022-12-13 PROCEDURE — 92523 SPEECH SOUND LANG COMPREHEN: CPT

## 2022-12-13 PROCEDURE — 96125 COGNITIVE TEST BY HC PRO: CPT

## 2022-12-13 NOTE — PROGRESS NOTES
[facility] Dept              [address] [place] [address], [address] [place] [address]                 Phone: [phone]                                  Fax:  [phone]                     [facility] Outpatient  Speech Language Pathology  Speech Language/Cognitive Evaluation    NAME:Ester Cotton Handler  : 1950 (73 y.o.)   [x]   confirmed    MRN: [id number]  PATIENT DIAGNOSIS(ES): Post concussion syndrome [F07.81]  Word finding difficulty [R47.89]  Referring Provider: Geraldine Martin  No chief complaint on file. Patient Active Problem List    Diagnosis Date Noted    Cellulitis and abscess of left leg 2022    Insect bite of left lower leg 2022    Closed head injury 10/31/2022    Concussion with no loss of consciousness 10/31/2022    Vestibulopathy of right ear 10/31/2022    Ataxia 10/31/2022    Cervical strain 10/31/2022    Major depressive disorder, recurrent, mild 2021    Allergic contact dermatitis due to plants, except food 2019    Lentigo 2017     Past Medical History:   Diagnosis Date    Sinus infection      No past surgical history on file. Allergies   Allergen Reactions    Penicillins Hives       Date of Evaluation: 2022   Evaluating Therapist: AMAURI Holbrook    Date of Onset: Per pt, deficits first noticed in 2022  Treatment Diagnosis and ICD-10 code: R41.48 Cognitive Impairment    Cognitive Diagnosis: Pt presents with mild cognitive-linguistic impairment characterized by decreased attention, verbal reasoning, executive functioning, auditory comprehension, and increase processing time negatively impacting her ability to effectively communicate wants and needs with caregivers and safely complete ADLs.    Diagnosis: Mild cognitive-linguistic impairment    Requires SLP Intervention: Yes     General  Chart reviewed: Yes    Subjective/Behavior: Pt arrived alone to evaluation this date, and was cooperative throughout. Pt c/o of word finding, memory, and auditory comprehension deficits secondary to TBI in July of 2022. Pt first noticed deficits in September of 2022. CT head completed 7/25/2022 and revealed \"No acute intracranial hemorrhage or mass effect. Right occipital scalp subcutaneous soft tissue swelling\". 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    Vision and Hearing  Vision  Vision: Impaired  Vision Exceptions: Wears glasses for reading  Hearing  Hearing: Within functional limits     Oral/Motor  Oral Motor   Labial: No impairment  Lingual: No impairment  Mandible: No impairment    Motor Speech  Motor Speech  Apraxic Characteristics: None  Dysarthric Characteristics: None  Intelligibility: No impairment  Overall Impairment Severity: None    Comprehension  Auditory Comprehension  Comprehension: Exceptions  Basic Questions: Mild  Complex Questions: Mild  Two Step Commands: WFL (Given repetition)  Multistep Commands: WFL (Given repetition)  Complex/Abstract Commands: WFL (Given increased time)  Conversation: Mild    Expression  Expression  Primary Mode of Expression: Verbal  Verbal Expression  Verbal Expression: Exceptions to functional limits  Confrontation: WFL  Convergent: Moderate (WFL with concrete.  Mod with abstract)  Divergent: WFL  Conversation: Mild  Interfering Components: Impaired thought organization, Attention  Effective Techniques: Provide extra time  Written Expression  Dominant Hand: Right  Written Expression: Unable to assess    Cognition  Orientation  Overall Orientation Status: Within Normal Limits  Attention  Attention: Exceptions to WFL  Alternating Attention: Mild  Divided Attention: Mild  Selective Attention: Mild  Sustained Attention: Mild  Memory  Memory: [facility]ceptions to [facility]  Short-term Memory: WFL  Working Memory: Mild  Problem Solving  Problem Solving: Exceptions to [facility]  Simple Functional Tasks: [facility]  Verbal Reasoning Skills: Mild (mild-mod)  Sequencing: [facility]  Executive Function Skills: Mild  Managing Finances: Mild  Managing Medications: WFL  Numeric Reasoning  Numeric Reasoning: Exceptions to [facility]   Calculations: Moderate  Money Management: Mild  Time: Mild  Abstract Reasoning  Abstract Reasoning: Exceptions to [facility]  Convergent Thinking: Moderate  Divergent Thinking: Mild  Safety/Judgment  Safety/Judgment: Exceptions to [facility]  Complex Functional Tasks: Mild  Novel Situations: WFL  Routine Tasks: [facility]  Flexibility of Thought: Mild    Additional Assessments    Objective:  CLQT (Cognitive Linguistic Quick Test) was administered which assesses the areas listed below. Results were as follows:    Domain Score Severity   Attention 159 MILD   Memory 147 WNL   Executive Function 28 WNL   Language 29 WNL   Visuospatial Skills 66 WNL   Clock Drawing Severity 10 MILD      Composite Severity Rating: 3.4 = MILD IMPAIRMENT      Informal Cognitive-Linguistic Evaluation    Results of Cognitive-linguistic Evaluation Total Score for each section Percentage Score Severity Rating for each section   Auditory Comprehension 9/10 90% WNL   Verbal Reasoning 11/15 73% MOD   Memory 13/17 76% MILD   Problem Solving/Sequencing 8/9 89% MILD/BORDERLINE   Executive Function 7/10 70% MOD     Overall Cognitive-Linguistic Severity Rating 79% = MILD IMPAIRMENT       Informal Observations:  Dilcia Pearce required frequent repetitions and increased processing time to answer general questions, and follow simple directives. She intermittently stated that she was \"dizzy\" and that task would need to be discontinued. Dilcia Pearce reported of 4 level pain in head, secondary to \"thinking\".  She became frustrated throughout the evaluation, with need fo education and comfort, secondary to not preforming her best. Pt reports that she becomes frustrated within home environment secondary to \"losing thoughts\" and word finding difficulties. Gato Xiao demonstrates deficits with the need for skilled speech therapy services targeting the areas of cognitive and linguistic skills. Recommendations  Requires SLP Intervention: Yes  Patient Education: Results of SLE/Cog eval  Patient Education Response: Verbalizes understanding  Duration of Treatment: For 8 weeks or until goals met. Frequency of Treatment: 1x/wk    Prognosis  Speech Therapy Prognosis  Prognosis: Fair  Prognosis Considerations: Age, Previous Level of Function, Motivation    Education  Individuals consulted  Consulted and agree with results and recommendations: Patient    Treatment/Goals  Short Term Goals  Time Frame for Short Term Goals: 4 weeks  Goal 1: Pt will complete executive functioning task (med mgmt, calculations, calendars, time) with 80% acc given cues as needed to increase safety with the completion of IADLs and ADLs. Goal 2: To decrease cognitive deficits and improve attention to tasks for safe completion of ADLs, pt will complete structured tasks addressing (sustained, selective, alternating, divided) attention with *80% accuracy and min cues. Goal 3: Pt will demonstrate auditory comprehension at the sentence/paragraph/conversation level with 80% acc with mild cues to promote pt's ability to promote safely preform ADLs within home environment. Goal 4: Pt will complete abstract verbal reasoning tasks (similarities/differences, divergent naming) with 80% acc given min cues to promote effective communication of wants and needs. Goal 5: Pt will be educated on word finding strategies and will implement in structured tasks with 80% acc given cues as neded to promote effective communication of wants and needs.   Long Term Goals  Time Frame for Long Term Goals: 8 weeks  Goal 1: Pt will increase her cognitive-linguistic skills in order to increase safety within home environment and effective communication of wants and needs with caregivers. Patient's goals: Pt was involved with creation of POC and with development of aforementioned goals. Pain Assessment  Patient does not c/o pain. Pain Re-assessment  Patient c/o pain: Location and pain level: 4 pain in head    Speech Therapy Level of Assistance Scale:    AUDITORY COMPREHENSION  Rating: Independent-Modified Independent    VERBAL EXPRESSION  Rating: Supervised Assistance-Minimal Assistance    MOTOR SPEECH  Rating: Independent    PROBLEM SOLVING  Rating: Modified Independent-Supervised Assistance    MEMORY  Rating: Modified Independent      MODIFIED VIZCAINO FALL RISK ASSESSMENT:    History of Falling (has patient fallen in the past 30 days?):    No (0 points)    Secondary Diagnosis (is there more than 1 medical diagnosis in patients medical history?):    Yes (15 points)    Ambulatory Aid:    No device is used (0 points)    Gait:    Normal/bedrest/wheelchair (0 points)    Mental Status:    Oriented to own ability (0 points)      Total points = 15    Fall Risk Level: Low  0 - 24: Low Risk - implement low risk fall prevention interventions    25 - 44: Medium risk  45 and higher: High Risk       Therapy Time  Time in: 1005     Time out: 1105  Total minutes: 60    Signature: Electronically signed by AMAURI London on 12/13/2022 at 2:57 PM       The following patient has been evaluated for speech therapy services and for therapy to continue, insurance requires physician review of the treatment plan initially and every 30 days. Please review the attached evaluation and/or summary of the patient's plan of care, and verify that you agree therapy should continue by signing the attached document and sending it back to our office. If you have any questions or concerns, please don't hesitate to call.   Thank you for your referral.        Physician Signature:________________________________Date:__________________  By signing above, therapists plan is approved by physician

## 2022-12-19 ENCOUNTER — APPOINTMENT (OUTPATIENT)
Dept: PHYSICAL THERAPY | Age: 72
End: 2022-12-19
Payer: MEDICARE

## 2022-12-19 PROCEDURE — 97112 NEUROMUSCULAR REEDUCATION: CPT

## 2022-12-19 NOTE — PROGRESS NOTES
[facility]  Outpatient Physical Therapy    Treatment Note        Date: 2022  Patient: Fina Bates  : 1950   Confirmed: Yes  MRN: [id number]  Referring Provider: Bulah Mohs, MD    Medical Diagnosis: Strain of muscle, fascia and tendon at neck level, initial encounter [S16. 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 Pt took 1 hr to write out an ornament design.  Pt states cold is making her head hurt  HEP Compliance:  [x] Good [] Fair [] Poor [] Reports not doing due to:    Pain Screening  Patient Currently in Pain: Denies    Treatment:  Exercises:  Exercises  Exercise 5: VOR standing x 1 H/V 2/5  Exercise 10: Dgi head turns and turning 360  Exercise 12: 180 turns between hurdles slowly, pt felt like passing out  Exercise 13: SCANNING/ head turns in hallway- identifying numbers and letters on wall while ambulating- difficulty with diagonals, dizziness  Exercise 15: 4 square stepping with spotting head turns, diagonals with inc'd dizziness     Modalities:  Moist Heat (CPT 76917)  Patient Position: Seated  Number Minutes Moist Heat: 5  Moist heat location: Cervical  Post treatment skin assessment: Redness - no adverse reaction       *Indicates exercise, modality, or manual techniques to be initiated when appropriate        Assessment: Body Structures, Functions, Activity Limitations Requiring Skilled Therapeutic Intervention: Decreased ROM, Decreased strength, Decreased endurance, Increased pain, Decreased posture, Decreased balance, Decreased functional mobility   Assessment: Increased tolerance to occulpomotor tasks today. c/o dizziness with diagonal stepping and 180 turns with pt c/o \" almost passing out\" CGA to correct LOB. Treatment Diagnosis: impaired balance, impaired gait, decreased cervical AROM     Post-Pain Assessment:       Pain Rating (0-10 pain scale):   0/10   Location and pain description same as pre-treatment unless indicated. Action: [x] NA   [] Perform HEP  [] Meds as prescribed  [] Modalities as prescribed   [] Call Physician     GOALS   Patient Goal(s): Patient Goals : \"not to have headaches and dizziness\"    Short Term Goals Completed by 4 weeks Goal Status   STG 1 Patient will report 25% reduction in dizziness symptoms. Met   STG 2 Patient will ambulate 150ft independently with improved bilateral step length and symmetry. Met   STG 3 Patient will report </= 1/10 pain in neck with movement for improved functional tolerance. In progress, Partially met   STG 4 Patient will demonstrates smooth pursuits WFLs for improved tracking. In progress       Long Term Goals Completed by 6 weeks Goal Status   LTG 1 Patient will increase cervical AROM >/= 10-15 degrees each direction for improved functional tolerance. In progress, Partially met   LTG 2 Pt be able to ambulate in a busy environment with scanning with minimal to no deviations and minimal dizziness reported. In progress   LTG 3 DGI >/= 21/24 to reduce pt's risk for falls.  In progress            Plan:  Frequency/Duration:  Plan  Plan Frequency: 1-2  Plan weeks: 6  Current Treatment Recommendations: Strengthening, ROM, Endurance training, Functional mobility training, Gait training, Neuromuscular re-education, Patient/Caregiver education & training, Equipment evaluation, education, & procurement, Safety education & training, Home exercise program, Modalities, Vestibular rehab, Dry needling, Manual  Modalities: Heat/Cold  Pt to continue current HEP. See objective section for any therapeutic exercise changes, additions or modifications this date.     Therapy Time:      PT Individual Minutes  Time In: 4886  Time Out: 1000  Minutes: 56  Timed Code Treatment Minutes: 56 Minutes  Procedure Minutes:0  Timed Activity Minutes Units   Neuro 56 4     Electronically signed by Evan Ramos PTA on 12/19/22 at 4:25 PM EST

## 2022-12-22 ENCOUNTER — HOSPITAL ENCOUNTER (OUTPATIENT)
Dept: SPEECH THERAPY | Age: 72
Setting detail: THERAPIES SERIES
Discharge: HOME | End: 2022-12-22
Payer: MEDICARE

## 2022-12-22 PROCEDURE — 97129 THER IVNTJ 1ST 15 MIN: CPT

## 2022-12-22 PROCEDURE — 97130 THER IVNTJ EA ADDL 15 MIN: CPT

## 2022-12-22 NOTE — PROGRESS NOTES
[facility]  Speech Language Pathology  Adult Daily Note    Denny Peterson  : 1950  [x]   confirmed      Date: 2022    Visit Information:  Visit Information  SLP Insurance Information: [facility] Medicare  Total # of Visits Approved:  (Medical Necessity)  Total # of Visits to Date: 3  Timeframe Approved From[de-identified] 22  Timeframe Approved To[de-identified] 01/10/23  No Show: 0  Canceled Appointment: 0       Plan of care signed (Y/N):     Yes    Certification UISDHR:-  Plan of Care Visit #2      Interventions used this date:  Cognitive Skill Development and Instruction in Compensatory Strategies    Subjective: Pt was on time and cooperative with tasks this date. SLP informed patient about [facility] outpatient therapy services moving to a new location as of the end of January. Provided patient with a handout explaining the transition and pt verbalized understanding. Behavior:  Alert and Cooperative     Pt became tearful and frustrated at the end of the session when asking SLP about her prognosis. SLP provided reassurance and supportive listening and encouraged patient to not be afraid to ask for help from family if needed. Objective/Assessment:   Patient progressing towards goals:   Goal 1: Pt will complete executive functioning task (med mgmt, calculations, calendars, time) with 80% acc given cues as needed to increase safety with the completion of IADLs and ADLs. Pt explained for financial management she uses: checks, online payments and over the phone payments. She only has 2 pills for herself to manage, but then has to manage pills using a pill box for her brother. Pt does all the cooking and management of appointments    Pt completed mid-level time management word problems related to cooking with 4/7 acc Ind and increased to 6/7 acc min cues  Completed money word problems with 80% acc Ind and increased to 100% acc with min verbal cues.  SLP introduced a strategy of writing amounts above stated problem, instead of having to visually alternate between the word problem and the chart. Pt implemented that for the last three problems and reported that it helped her concentration. Goal 2: To decrease cognitive deficits and improve attention to tasks for safe completion of ADLs, pt will complete structured tasks addressing (sustained, selective, alternating, divided) attention with 80% accuracy and min cues. Pt completed a wrong category task visually/reading with 83% acc Ind and increased to 91% acc min cues  When patient completed wrong category task orally with SLP reading list of words pt had 60% acc mod cues  Goal 3: Pt will demonstrate auditory comprehension at the sentence/paragraph/conversation level with 80% acc with mild cues to promote pt's ability to promote safely preform ADLs within home environment. Goal 4: Pt will complete abstract verbal reasoning tasks (similarities/differences, divergent naming) with 80% acc given min cues to promote effective communication of wants and needs. Goal 5: Pt will be educated on word finding strategies and will implement in structured tasks with 80% acc given cues as neded to promote effective communication of wants and needs. Pain Assessment:  Initial Assessment:  Patient does not c/o pain. Re-assessment:  Patient does not c/o pain. Plan:  Continue with current goals    Patient/Caregiver Education:  Patient/Caregiver educated on session. Patient/Caregiver stated verbal understanding of directions.     Time in: 1100  Time out: 1200  Minutes seen: 60      Signature:   Electronically signed by AMAURI Cristobal on 12/22/2022 at 12:07 PM

## 2022-12-27 ENCOUNTER — APPOINTMENT (OUTPATIENT)
Dept: PHYSICAL THERAPY | Age: 72
End: 2022-12-27
Payer: MEDICARE

## 2022-12-27 ENCOUNTER — HOSPITAL ENCOUNTER (OUTPATIENT)
Dept: SPEECH THERAPY | Age: 72
Setting detail: THERAPIES SERIES
Discharge: HOME | End: 2022-12-27
Payer: MEDICARE

## 2022-12-27 ENCOUNTER — OFFICE VISIT (OUTPATIENT)
Dept: FAMILY MEDICINE CLINIC | Age: 72
End: 2022-12-27
Payer: MEDICARE

## 2022-12-27 VITALS
HEIGHT: 63 IN | SYSTOLIC BLOOD PRESSURE: 162 MMHG | WEIGHT: 184 LBS | OXYGEN SATURATION: 97 % | BODY MASS INDEX: 32.6 KG/M2 | HEART RATE: 74 BPM | DIASTOLIC BLOOD PRESSURE: 90 MMHG | TEMPERATURE: 97.4 F | RESPIRATION RATE: 14 BRPM

## 2022-12-27 DIAGNOSIS — L98.9 SKIN LESION OF RIGHT LOWER EXTREMITY: Primary | ICD-10-CM

## 2022-12-27 PROCEDURE — 99213 OFFICE O/P EST LOW 20 MIN: CPT | Performed by: NURSE PRACTITIONER

## 2022-12-27 PROCEDURE — 97110 THERAPEUTIC EXERCISES: CPT

## 2022-12-27 PROCEDURE — 97116 GAIT TRAINING THERAPY: CPT

## 2022-12-27 PROCEDURE — 92507 TX SP LANG VOICE COMM INDIV: CPT

## 2022-12-27 PROCEDURE — 1123F ACP DISCUSS/DSCN MKR DOCD: CPT | Performed by: NURSE PRACTITIONER

## 2022-12-27 PROCEDURE — 97112 NEUROMUSCULAR REEDUCATION: CPT

## 2022-12-27 RX ORDER — SULFAMETHOXAZOLE AND TRIMETHOPRIM 800; 160 MG/1; MG/1
1 TABLET ORAL 2 TIMES DAILY
Qty: 20 TABLET | Refills: 0 | Status: SHIPPED | OUTPATIENT
Start: 2022-12-27 | End: 2023-01-06

## 2022-12-27 ASSESSMENT — ENCOUNTER SYMPTOMS
VOMITING: 0
NAUSEA: 0
DIARRHEA: 0
WHEEZING: 0
COUGH: 0
SORE THROAT: 0
SHORTNESS OF BREATH: 0
RHINORRHEA: 0

## 2022-12-27 NOTE — PROGRESS NOTES
[address]dee villegas, [address]     Ph: [phone]  Fax: [phone]      [] Certification  [] Recertification []  Plan of Care  [x] Progress Note [] Discharge      Referring Provider: Armando Hernandez MD     From: Merrill Oliveros DPT  Patient: Cristian Giles (67 y.o. female) : 1950 Date: 2022  Medical Diagnosis: Strain of muscle, fascia and tendon at neck level, initial encounter [S16. 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]       Treatment Diagnosis: impaired balance, impaired gait, decreased cervical AROM    Plan of Care/Certification Expiration Date: : 23   Progress Report Period from:  2022  to 2022    Visits to Date: 25 No Show: 1 Cancelled Appts: 1    OBJECTIVE:   Short Term Goals - Time Frame for Short Term Goals: 4 weeks    Goals Current/Discharge status  Status   Short Term Goal 1: Patient will report 25% reduction in dizziness symptoms. Pt currently reporting 50% improval Met   Short Term Goal 2: Patient will ambulate 150ft independently with improved bilateral step length and symmetry. Ambulation  Device: No Device  Assistance: Supervision, Modified Independent  Quality of Gait: Good radha and stride length, Scanning with  reports of dizziness, standing RB to recover  Gait Deviations: Slow Radha, Decreased step length, Decreased step height, Deviated path  Distance: 2 laps track =528ft      Met   Short Term Goal 3: Patient will report </= 1/10 pain in neck with movement for improved functional tolerance.   Pt reports 3- with right rotation only inconsistantly In progress, Partially met   Short Term Goal 4: Patient will demonstrates smooth pursuits WFLs for improved tracking. WFL Met     Long Term Goals - Time Frame for Long Term Goals : 6 weeks  Goals Current/ Discharge status Status   Long Term Goal 1: Patient will increase cervical AROM >/= 10-15 degrees each direction for improved functional tolerance. AROM Cervical Spine   Cervical flexion: 70  Cervical extension: 40  Cervical right lateral: 40  Cervical left lateral: 46  Cervical right rotation: 48 (with pain)  Cervical left rotation: 53       Met   Long Term Goal 2: Pt be able to ambulate in a busy environment with scanning with minimal to no deviations and minimal dizziness reported. Pt c/o dizziness with horizontal scanning, stops with standing RB to recover In progress, Partially met   Long Term Goal 3: DGI >/= 21/24 to reduce pt's risk for falls. DGI=22/24 Met       Body Structures, Functions, Activity Limitations Requiring Skilled Therapeutic Intervention: Decreased ROM, Decreased strength, Decreased endurance, Increased pain, Decreased posture, Decreased balance, Decreased functional mobility   Assessment: Pt meeting 5/6 goals and is comfortable trialing HEP at home for a few weeks to assess continued need for concussion therapy. Pt chart will be placed on hold and will follow up with pt to determine  continuation or Discharge in a few weeks. PLAN: [x] Evaluate and Treat  Frequency/Duration:  Plan Frequency: 1-2  Plan weeks: 6  Current Treatment Recommendations: Strengthening, ROM, Endurance training, Functional mobility training, Gait training, Neuromuscular re-education, Patient/Caregiver education & training, Equipment evaluation, education, & procurement, Safety education & training, Home exercise program, Modalities, Vestibular rehab, Dry needling, Manual  Modalities: Heat/Cold  Additional Comments: HOLD for 2-3 weeks, call to follow up     Precautions:                            Patient Status:[] Continue/ Initiate plan of Care    [] Discharge PT. Recommend pt continue with HEP.      [] Additional visits requested, Please re-certify for additional visits:    [x] Hold         Signature: Obj Info By:Electronically signed by Brooke Soto PTA on 12/27/22 at 10:57 AM EST  Electronically signed by Hue Hammer PT on 12/30/2022 at 12:03 PM        If you have any questions or concerns, please don't hesitate to call. Thank you for your referral.    I have reviewed this plan of care and certify a need for medically necessary rehabilitation services.     Physician Signature:__________________________________________________________  Date:  Please sign and return

## 2022-12-27 NOTE — PROGRESS NOTES
[facility] Outpatient  Speech Language Pathology  Adult Daily Note    Jennifer Hanson  : 1950  [x]   confirmed      Date: 2022    Visit Information:  Visit Information  SLP Insurance Information: [place] [employer]  Total # of Visits Approved:  (Medical Necessity)  Total # of Visits to Date: 4  Timeframe Approved From[de-identified] 22  Timeframe Approved To[de-identified] 01/10/23  No Show: 0  Canceled Appointment: 0       Plan of care signed (Y/N):     Yes    Certification CAZZVU:/-  Plan of Care Visit #2      Interventions used this date:  Cognitive Skill Development and Instruction in Compensatory Strategies    Subjective: Pt was on time and cooperative with tasks this date. Pt inquired about family coming and observing sessions, as they get frustrated with pt. SLP provided education and stated family can come if pt wishes. Pt reports that when she thinks for prolonged periods of time, her head begins to hurt. Behavior:  Alert and Cooperative         Objective/Assessment:   Patient progressing towards goals:   Goal 1: Pt will complete executive functioning task (med mgmt, calculations, calendars, time) with 80% acc given cues as needed to increase safety with the completion of IADLs and ADLs. Not targeted. Goal 2: To decrease cognitive deficits and improve attention to tasks for safe completion of ADLs, pt will complete structured tasks addressing (sustained, selective, alternating, divided) attention with 80% accuracy and min cues. Not targeted. Goal 3: Pt will demonstrate auditory comprehension at the sentence/paragraph/conversation level with 80% acc with mild cues to promote pt's ability to promote safely preform ADLs within home environment.   General information: 100% acc  Paragraph comprehension: 67% acc    Goal 4: Pt will complete abstract verbal reasoning tasks (similarities/differences, divergent naming) with 80% acc given min cues to promote effective communication of wants and needs. 87% acc with abstract divergent naming with alternating attention    Goal 5: Pt will be educated on word finding strategies and will implement in structured tasks with 80% acc given cues as neded to promote effective communication of wants and needs. SLP educated pt on word finding strategies this date. Given mod-max verbal and visual cues, pt was able to implement during structured tasks. Pain Assessment:  Initial Assessment:  Patient does not c/o pain. Re-assessment:  Patient does not c/o pain. Plan:  Continue with current goals    Patient/Caregiver Education:  Patient/Caregiver educated on session. Patient/Caregiver stated verbal understanding of directions.     Time in: 1005  Time out: Sushil 38  Minutes seen: 39      Signature:  Electronically signed by AMAURI Abel on 12/27/2022 at 11:07 AM

## 2022-12-27 NOTE — PROGRESS NOTES
Subjective:      Patient ID: Florencio Schreiber is a 67 y.o. female who presents today for:  Chief Complaint   Patient presents with    Other     Patient presents today with cellulitis sx that started on her right outer lower calf and seems like is coming back from when she had it on  the beginning of December. HPI      Dec 2nd she was treated for cellultits and abscess  The first time  she popped the sores   Was on bactrim and keflex  Went away completely   Now another bump she noticed on xmas after putting the support stockings on   Leg is dry   She has varicose veins   Its tender to touch surrounding it a little   Pustule on the right leg lateral side     Past Medical History:   Diagnosis Date    Sinus infection      History reviewed. No pertinent surgical history. Social History     Socioeconomic History    Marital status:      Spouse name: Not on file    Number of children: Not on file    Years of education: Not on file    Highest education level: Not on file   Occupational History    Not on file   Tobacco Use    Smoking status: Never    Smokeless tobacco: Never   Substance and Sexual Activity    Alcohol use: Yes    Drug use: No    Sexual activity: Never   Other Topics Concern    Not on file   Social History Narrative    Not on file     Social Determinants of Health     Financial Resource Strain: Low Risk     Difficulty of Paying Living Expenses: Not hard at all   Food Insecurity: No Food Insecurity    Worried About Running Out of Food in the Last Year: Never true    Ran Out of Food in the Last Year: Never true   Transportation Needs: Not on file   Physical Activity: Inactive    Days of Exercise per Week: 0 days    Minutes of Exercise per Session: 0 min   Stress: Not on file   Social Connections: Not on file   Intimate Partner Violence: Not on file   Housing Stability: Not on file     History reviewed. No pertinent family history.   Allergies   Allergen Reactions    Penicillins Hives     Current Outpatient Medications   Medication Sig Dispense Refill    sulfamethoxazole-trimethoprim (BACTRIM DS;SEPTRA DS) 800-160 MG per tablet Take 1 tablet by mouth 2 times daily for 10 days 20 tablet 0    buPROPion (WELLBUTRIN XL) 300 MG extended release tablet Take 1 tablet by mouth every morning 90 tablet 2    PARoxetine (PAXIL) 10 MG tablet Take 1 tablet by mouth daily 90 tablet 3    topiramate (TOPAMAX) 50 MG tablet TAKE 1 TABLET BY MOUTH EVERY NIGHT 90 tablet 2    meloxicam (MOBIC) 15 MG tablet Take 15 mg by mouth daily       No current facility-administered medications for this visit. Review of Systems   Constitutional:  Negative for chills, fatigue and fever. HENT:  Negative for congestion, rhinorrhea and sore throat. Respiratory:  Negative for cough, shortness of breath and wheezing. Gastrointestinal:  Negative for diarrhea, nausea and vomiting. Musculoskeletal:  Negative for myalgias. Skin:  Positive for wound. Negative for rash. Neurological:  Negative for headaches. Objective:   BP (!) 162/90 (Site: Right Upper Arm, Position: Sitting, Cuff Size: Large Adult)   Pulse 74   Temp 97.4 °F (36.3 °C) (Temporal)   Resp 14   Ht 5' 3\" (1.6 m)   Wt 184 lb (83.5 kg)   SpO2 97%   BMI 32.59 kg/m²     Physical Exam  Vitals reviewed. Constitutional:       Appearance: Normal appearance. HENT:      Head: Normocephalic and atraumatic. Nose: Nose normal.      Mouth/Throat:      Lips: Pink. Eyes:      General: Lids are normal.      Conjunctiva/sclera: Conjunctivae normal.   Cardiovascular:      Rate and Rhythm: Normal rate. Pulmonary:      Effort: Pulmonary effort is normal.   Musculoskeletal:      Cervical back: Normal range of motion. Skin:     General: Skin is warm and dry. Findings: Lesion present. No rash. Comments: Small pustule about the size of a pencil eraser. Red just closely surrounding it, tender to touch. No induration.  Appears very superficial. Her skin is very dry and she has varicose veins    Neurological:      General: No focal deficit present. Mental Status: She is alert and oriented to person, place, and time. Psychiatric:         Mood and Affect: Mood normal.         Behavior: Behavior normal. Behavior is cooperative. Assessment:       Diagnosis Orders   1. Skin lesion of right lower extremity  sulfamethoxazole-trimethoprim (BACTRIM DS;SEPTRA DS) 800-160 MG per tablet        No results found for this visit on 12/27/22. Plan:   Encouraged warm compresses, marked the area, if gets worse on surrounding skin then start antibiotic. Explained importance of keeping the legs moisturized. Assessment & Plan   Berny was seen today for other. Diagnoses and all orders for this visit:    Skin lesion of right lower extremity  -     sulfamethoxazole-trimethoprim (BACTRIM DS;SEPTRA DS) 800-160 MG per tablet; Take 1 tablet by mouth 2 times daily for 10 days    No orders of the defined types were placed in this encounter. Orders Placed This Encounter   Medications    sulfamethoxazole-trimethoprim (BACTRIM DS;SEPTRA DS) 800-160 MG per tablet     Sig: Take 1 tablet by mouth 2 times daily for 10 days     Dispense:  20 tablet     Refill:  0       There are no discontinued medications. Return for Follow up with PCP as scheduled . Reviewed with the patient/family: current clinical status & medications. Side effects of the medication prescribed today, as well as treatment plan/rationale and result expectations have been discussed with the patient/family who expresses understanding. Patient will be discharged home in stable condition. Follow up with PCP to evaluate treatment results or return if symptoms worsen or fail to improve. Discussed signs and symptoms which require immediate follow-up in ED/call to 911. Understanding verbalized.      I have reviewed the patient's medical history in detail and updated the computerized patient record.     Jud Sneed, APRN - CNP

## 2022-12-27 NOTE — PROGRESS NOTES
[facility]  Outpatient Physical Therapy    Treatment Note        Date: 2022  Patient: Bhumika Todd  : 1950   Confirmed: Yes  MRN: [id number]  Referring Provider: Ben Mas MD    Medical Diagnosis: Strain of muscle, fascia and tendon at neck level, initial encounter [S16. 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]       Treatment Diagnosis: impaired balance, impaired gait, decreased cervical AROM    Visit Information:  Insurance: Payor: [facility] MEDICARE / Plan: Lia Del Real / Product Type: *No Product type* /   PT Visit Information  Onset Date: 22  PT Insurance Information: [facility] Medicare  Total # of Visits to Date:   Plan of Care/Certification Expiration Date: 23  No Show: 1  Progress Note Due Date: 23  Canceled Appointment: 1  Progress Note Counter: 10/12    Subjective Information:  Subjective: Pt states she took her time preparing for [place], had to take breaks to stay focused  HEP Compliance:  [x] Good [] Jennifer Peraza [] Poor [] Reports not doing due to:    Pain Screening  Patient Currently in Pain: Denies    Treatment:  Exercises:  Exercises  Exercise 4: Smooth pursuits in sitting: Horiz/Vert 30s x 1  Exercise 5: VOR standing x 1 H/V , no dizziness, difficulty focusing  Exercise 13: DGI= 22  Exercise 14: ambulation around track, scanning horizontally, increased dizziness     *Indicates exercise, modality, or manual techniques to be initiated when appropriate    Objective Measures:      Ambulation  Device: No Device  Assistance: Supervision, Modified Independent  Quality of Gait: Good radha and stride length, Scanning with  reports of dizziness, standing RB to recover  Gait Deviations: Slow Radha, Decreased step length, Decreased step height, Deviated path  Distance: 2 laps track =528ft          ROM: [] NT  [x] ROM measurements:          AROM Cervical Spine   Cervical flexion: 70  Cervical extension: 40  Cervical right lateral: 40  Cervical left lateral: 46  Cervical right rotation: 48 (with pain)  Cervical left rotation: 53      Assessment: Body Structures, Functions, Activity Limitations Requiring Skilled Therapeutic Intervention: Decreased ROM, Decreased strength, Decreased endurance, Increased pain, Decreased posture, Decreased balance, Decreased functional mobility   Assessment: Pt meeting 5/6 goals and is comfortable trialing HEP at home for a few weeks to assess continued need for concussion therapy. Pt chart will be placed on hold and will follow up with pt to determine  continuation or Discharge in a few weeks. Treatment Diagnosis: impaired balance, impaired gait, decreased cervical AROM     Post-Pain Assessment:       Pain Rating (0-10 pain scale):   0/10   Location and pain description same as pre-treatment unless indicated. Action: [x] NA   [] Perform HEP  [] Meds as prescribed  [] Modalities as prescribed   [] Call Physician     GOALS   Patient Goal(s): Patient Goals : \"not to have headaches and dizziness\"    Short Term Goals Completed by 4 weeks Goal Status   STG 1 Patient will report 25% reduction in dizziness symptoms. Met   STG 2 Patient will ambulate 150ft independently with improved bilateral step length and symmetry. Met   STG 3 Patient will report </= 1/10 pain in neck with movement for improved functional tolerance. In progress, Partially met   STG 4 Patient will demonstrates smooth pursuits WFLs for improved tracking. Met       Long Term Goals Completed by 6 weeks Goal Status   LTG 1 Patient will increase cervical AROM >/= 10-15 degrees each direction for improved functional tolerance. Met   LTG 2 Pt be able to ambulate in a busy environment with scanning with minimal to no deviations and minimal dizziness reported.  In progress, Partially met   LTG 3 DGI >/= 21/24 to reduce pt's risk for falls. Met           Plan:  Frequency/Duration:  Plan  Plan Frequency: 1-2  Plan weeks: 6  Current Treatment Recommendations: Strengthening, ROM, Endurance training, Functional mobility training, Gait training, Neuromuscular re-education, Patient/Caregiver education & training, Equipment evaluation, education, & procurement, Safety education & training, Home exercise program, Modalities, Vestibular rehab, Dry needling, Manual  Modalities: Heat/Cold  Additional Comments: HOLD for 2-3 weeks, call to follow up  Pt to continue current HEP. See objective section for any therapeutic exercise changes, additions or modifications this date.     Therapy Time:      PT Individual Minutes  Time In: 6845  Time Out: 1003  Minutes: 47  Timed Code Treatment Minutes: 47 Minutes  Procedure Minutes:0  Timed Activity Minutes Units   Neuro 29 1   TE 8 1   Gait 10 1     Electronically signed by Mala Dash PTA on 12/27/22 at 10:55 AM EST

## 2023-01-01 NOTE — PATIENT INSTRUCTIONS
Patient Education        doxycycline (oral/injection)  Pronunciation:  DOX cade torres  Brand:  Acticlate, Adoxa, Alodox, Avidoxy, Doryx, Mondoxyne NL, Monodox, Morgidox, Oracea, Oraxyl, Targadox, Vibramycin  What is the most important information I should know about doxycycline? You should not take this medicine if you are allergic to any tetracycline antibiotic. Children younger than 6years old should use doxycycline only in cases of severe or life-threatening conditions. This medicine can cause permanent yellowing or graying of the teeth in children  Using doxycycline during pregnancy could harm the unborn baby or cause permanent tooth discoloration later in the baby's life. What is doxycycline? Doxycycline is a tetracycline antibiotic that fights bacteria in the body. Doxycycline is used to treat many different bacterial infections, such as acne, urinary tract infections, intestinal infections, eye infections, gonorrhea, chlamydia, periodontitis (gum disease), and others. Doxycycline is also used to treat blemishes, bumps, and acne-like lesions caused by rosacea. Doxycycline will not treat facial redness caused by rosacea. Some forms of doxycycline are used to prevent malaria, to treat anthrax, or to treat infections caused by mites, ticks, or lice. Doxycycline may also be used for purposes not listed in this medication guide. What should I discuss with my healthcare provider before taking doxycycline? You should not take this medicine if you are allergic to doxycycline or other tetracycline antibiotics such as demeclocycline, minocycline, tetracycline, or tigecycline. Tell your doctor if you have ever had:  · liver disease;  · kidney disease;  · asthma or sulfite allergy;  · increased pressure inside your skull; or  · if you also take isotretinoin, seizure medicine, or a blood thinner such as warfarin (Coumadin).   If you are using doxycycline to treat gonorrhea, your doctor may test you to make sure you do not also have syphilis, another sexually transmitted disease. Taking this medicine during pregnancy may affect tooth and bone development in the unborn baby. Taking doxycycline during the last half of pregnancy can cause permanent tooth discoloration later in the baby's life. Tell your doctor if you are pregnant or if you become pregnant. Doxycycline can make birth control pills less effective. Ask your doctor about using a non-hormonal birth control (condom, diaphragm with spermicide) to prevent pregnancy. Doxycycline can pass into breast milk and may affect bone and tooth development in a nursing infant. Do not breast-feed while you are taking doxycycline. Doxycycline can cause permanent yellowing or graying of the teeth in children younger than 6years old. Children should use doxycycline only in cases of severe or life-threatening conditions such as anthrax or [facility] spotted fever. The benefit of treating a serious condition may outweigh any risks to the child's tooth development. How should I take doxycycline? Follow all directions on your prescription label and read all medication guides or instruction sheets. Use the medicine exactly as directed. Take doxycycline with a full glass of water. Drink plenty of liquids while you are taking doxycycline. Read and carefully follow any Instructions for Use provided with your medicine. Ask your doctor or pharmacist if you do not understand these instructions. Most brands of doxycyline may be taken with food or milk if the medicine upsets your stomach. Different brands of doxycycline may have different instructions about taking them with or without food. Take Oracea on an empty stomach, at least 1 hour before or 2 hours after a meal.  You may need to split a doxycycline tablet to get the correct dose. Follow your doctor's instructions. Swallow a delayed-release capsule or tablet whole. Do not crush, chew, break, or open it.   Measure liquid medicine  with the dosing syringe provided, or with a special dose-measuring spoon or medicine cup. If you do not have a dose-measuring device, ask your pharmacist for one. If you take doxycycline to prevent malaria: Start taking the medicine 1 or 2 days before entering an area where malaria is common. Continue taking the medicine every day during your stay and for at least 4 weeks after you leave the area. Doxycycline is usually given by injection only if you are unable to take the medicine by mouth. A healthcare provider will give you this injection as an infusion into a vein. Use this medicine for the full prescribed length of time, even if your symptoms quickly improve. Skipping doses can increase your risk of infection that is resistant to medication. Doxycycline will not treat a viral infection such as the flu or a common cold. Store at room temperature away from moisture, heat, and light. Throw away any unused medicine after the expiration date on the label has passed. Using  doxycycline can cause damage to your kidneys. What happens if I miss a dose? Take the medicine as soon as you can, but skip the missed dose if it is almost time for your next dose. Do not take two doses at one time. What happens if I overdose? Seek emergency medical attention or call the Poison Help line at 1-937.698.1967. What should I avoid while taking doxycycline? Do not take iron supplements, multivitamins, calcium supplements, antacids, or laxatives within 2 hours before or after taking doxycycline. Avoid taking any other antibiotics with doxycycline unless your doctor has told you to. Doxycycline could make you sunburn more easily. Avoid sunlight or tanning beds. Wear protective clothing and use sunscreen (SPF 30 or higher) when you are outdoors. Antibiotic medicines can cause diarrhea, which may be a sign of a new infection. If you have diarrhea that is watery or bloody, call your doctor.  Do not use anti-diarrhea medicine unless your doctor tells you to. What are the possible side effects of doxycycline? Get emergency medical help if you have signs of an allergic reaction (hives, difficult breathing, swelling in your face or throat) or a severe skin reaction (fever, sore throat, burning in your eyes, skin pain, red or purple skin rash that spreads and causes blistering and peeling). Seek medical treatment if you have a serious drug reaction that can affect many parts of your body. Symptoms may include: skin rash, fever, swollen glands, flu-like symptoms, muscle aches, severe weakness, unusual bruising, or yellowing of your skin or eyes. This reaction may occur several weeks after you began using doxycycline. Call your doctor at once if you have:  · severe stomach pain, diarrhea that is watery or bloody;  · throat irritation, trouble swallowing;  · chest pain, irregular heart rhythm, feeling short of breath;  · little or no urination;  · low white blood cell counts --fever, chills, swollen glands, body aches, weakness, pale skin, easy bruising or bleeding;  · increased pressure inside the skull --severe headaches, ringing in your ears, dizziness, nausea, vision problems, pain behind your eyes; or  · signs of liver or pancreas problems --loss of appetite, upper stomach pain (that may spread to your back), tiredness, nausea or vomiting, fast heart rate, dark urine, jaundice (yellowing of the skin or eyes). Common side effects may include:  · nausea, vomiting, upset stomach, loss of appetite;  · mild diarrhea;  · skin rash or itching;  · darkened skin color; or  · vaginal itching or discharge. This is not a complete list of side effects and others may occur. Call your doctor for medical advice about side effects. You may report side effects to FDA at 1-354-FDA-4949. What other drugs will affect doxycycline? Sometimes it is not safe to use certain medications at the same time.  Some drugs can affect your [NL] : warm, clear reactions, or adverse effects. If you have questions about the drugs you are taking, check with your doctor, nurse or pharmacist.  Copyright 7070-5889 [address]. Version: 21.02. Revision date: 5/22/2019. Care instructions adapted under license by [facility] ([facility]). If you have questions about a medical condition or this instruction, always ask your healthcare professional. [place] [address] any warranty or liability for your use of this information.

## 2023-01-03 ENCOUNTER — HOSPITAL ENCOUNTER (OUTPATIENT)
Dept: SPEECH THERAPY | Age: 73
Setting detail: THERAPIES SERIES
Discharge: HOME | End: 2023-01-03
Payer: MEDICARE

## 2023-01-03 PROCEDURE — 97129 THER IVNTJ 1ST 15 MIN: CPT

## 2023-01-03 PROCEDURE — 97130 THER IVNTJ EA ADDL 15 MIN: CPT

## 2023-01-03 NOTE — PROGRESS NOTES
[facility]  Speech Language Pathology  Adult Daily Note    Yesenia Byrne  : 1950  [x]   confirmed      Date: 1/3/2023    Visit Information:  Visit Information  SLP Insurance Information: [facility] Medicare  Total # of Visits Approved:  (Medical Necessity)  Total # of Visits to Date: 1  Timeframe Approved From[de-identified] 22  Timeframe Approved To[de-identified] 01/10/23  No Show: 0  Canceled Appointment: 0   **updated visit count    Plan of care signed (Y/N):     Yes    Certification RVFFNT:-  Plan of Care Visit #3      Interventions used this date:  Cognitive Skill Development and Instruction in Compensatory Strategies    Subjective: Pt was cooperative with tasks this date. Upon arrival patient relayed information about a family gathering over the holidays that was difficult for her. She had her son and daughter and family to her house for a gathering and had pre-made parts of the meal. Family was trying to help her and telling her \"mom go sit down and we will take care of it\". She said that she just couldn't stop herself from trying to do something. Then during a conversation she lost her train of thought and became very frustrated. At one point she went and sat in the cool garage to take a break. SLP said she would look for strategies and educational handouts for patient and family related to post-concussion management. Also encouraged patient to reach out for a counselor/therapist to help manage possible stress/anxiety/frustration and she verbalized understanding. Behavior:  Alert and Cooperative         Objective/Assessment:   Patient progressing towards goals:   Goal 1: Pt will complete executive functioning task (med mgmt, calculations, calendars, time) with 80% acc given cues as needed to increase safety with the completion of IADLs and ADLs.   Completed mid level time management problems with 100% acc Mod I    Goal 2: To decrease cognitive deficits and improve attention to tasks for safe completion of ADLs, pt will complete structured tasks addressing (sustained, selective, alternating, divided) attention with 80% accuracy and min cues. Pt completed a visual selective attention task with 63% acc Ind and increased to 86% acc min cues    Goal 3: Pt will demonstrate auditory comprehension at the sentence/paragraph/conversation level with 80% acc with mild cues to promote pt's ability to promote safely preform ADLs within home environment. Not addressed     Goal 4: Pt will complete abstract verbal reasoning tasks (similarities/differences, divergent naming) with 80% acc given min cues to promote effective communication of wants and needs. Pt completed similarities/differences task with 100% acc similarities and 60% acc Ind for differences and increased to 90% acc with min cues    Goal 5: Pt will be educated on word finding strategies and will implement in structured tasks with 80% acc given cues as neded to promote effective communication of wants and needs. Pain Assessment:  Initial Assessment:  Patient does not c/o pain. Re-assessment:  Patient does not c/o pain. Plan:  Continue with current goals    Patient/Caregiver Education:  Patient/Caregiver educated on session. Patient/Caregiver stated verbal understanding of directions.     Time in: 1110  Time out: 1200  Minutes seen: 48  *Pt seen upon arrival    Signature: Electronically signed by AMAURI Alcantar on 1/3/2023 at 12:07 PM

## 2023-01-10 ENCOUNTER — APPOINTMENT (OUTPATIENT)
Dept: SPEECH THERAPY | Age: 73
End: 2023-01-10
Payer: MEDICARE

## 2023-01-10 PROCEDURE — 97129 THER IVNTJ 1ST 15 MIN: CPT

## 2023-01-10 PROCEDURE — 97130 THER IVNTJ EA ADDL 15 MIN: CPT

## 2023-01-10 NOTE — PROGRESS NOTES
[facility]  Speech Language Pathology  Adult Daily Note    Tracy Galeano  : 1950  [x]   confirmed      Date: 1/10/2023    Visit Information:  Visit Information  SLP Insurance Information: [facility] Medicare  Total # of Visits Approved:  (Medical Necessity)  Total # of Visits to Date: 2  No Show: 0  Canceled Appointment: 0     Plan of care signed (Y/N):     Yes    Certification MNAIVA:-[id number]  Plan of Care Visit #4      Interventions used this date:  Cognitive Skill Development and Instruction in Compensatory Strategies    Subjective: Pt was cooperative with tasks this date. Pt said she went to a [employer] exercise\" group at [employer] last night and felt good about the exercise, but had a little headache after the group and then had difficulty driving home afterwards. Pt reported her  is looking for support groups for TBI/concussion. Behavior:  Alert and Cooperative         Objective/Assessment:   Patient progressing towards goals:   Goal 1: Pt will complete executive functioning task (med mgmt, calculations, calendars, time) with 80% acc given cues as needed to increase safety with the completion of IADLs and ADLs. Pt was unable to verbally problem solve a solution to her issue at home of starting a task and then thinking of something else and becoming scattered. SLP recommended using a \"thought notepad\", where when she thinks of a task while completing something else she can write it down and then designate a time to come back to her thought notepad during the day. Pt verbalized understanding. Goal 2: To decrease cognitive deficits and improve attention to tasks for safe completion of ADLs, pt will complete structured tasks addressing (sustained, selective, alternating, divided) attention with 80% accuracy and min cues.   Not targeted     Goal 3: Pt will demonstrate auditory comprehension at the sentence/paragraph/conversation level with 80% acc with mild cues to promote pt's ability to promote safely preform ADLs within home environment. Completed auditory comprehension of paragraph with 80% acc Ind and 100% acc min cues    Goal 4: Pt will complete abstract verbal reasoning tasks (similarities/differences, divergent naming) with 80% acc given min cues to promote effective communication of wants and needs. Not targeted    Goal 5: Pt will be educated on word finding strategies and will implement in structured tasks with 80% acc given cues as neded to promote effective communication of wants and needs. Pt reports using circumlocution strategy during conversation at exercise group and it helping with word finding. Pt used SFA and had 67% acc Ind and increased to 87% acc Min cues      Pain Assessment:  Initial Assessment:  Patient c/o pain: Location and pain level: 2/10, surgical incision when it's cold  Pt able to proceed with session. Re-assessment:  Patient c/o pain: Location and pain level: 2/10, surgical incision  Pt able to proceed with session. Plan:  Continue with current goals    Patient/Caregiver Education:  Patient/Caregiver educated on session. Patient/Caregiver provided with home program: start notepad, bring in iPad and bills to organize on reminder trang  Patient/Caregiver stated verbal understanding of directions.     Time in: 1100  Time out: 1155  Minutes seen: 54      Signature: Electronically signed by AMAURI Almanza on 1/10/2023 at 12:12 PM

## 2023-01-10 NOTE — PROGRESS NOTES
[facility]          [address], [address]              Phone: [phone]           Fax: [phone]                         [facility]  Speech Language Pathology  Adult Progress Note                          Physician: NINO Hernandez-CNP      From: Leah Marino, SLP, MA,CCC-SLP   Patient: Maribell Grant       : 1950  Diagnosis: Cognitive Impairment     Date: 1/10/2023  Treatment Diagnosis: ICD 10 R41.89  Date of Evaluation: 22      Plan of Care/Treatment to date: Cognitive Skill Development and Instruction in Compensatory Strategies    Date range from 22 to 1/10/23. Subjective: Pt has been cooperative with all tasks. Pt has expressed anxiety and frustration with decreased abilities. Pt has started to seek out support groups. Progress toward Short-Term Goals:   Goal 1: Pt will complete executive functioning task (med mgmt, calculations, calendars, time) with 80% acc given cues as needed to increase safety with the completion of IADLs and ADLs. Progress made  Goal 2: To decrease cognitive deficits and improve attention to tasks for safe completion of ADLs, pt will complete structured tasks addressing (sustained, selective, alternating, divided) attention with 80% accuracy and min cues. Progress made  Goal 3: Pt will demonstrate auditory comprehension at the sentence/paragraph/conversation level with 80% acc with mild cues to promote pt's ability to promote safely preform ADLs within home environment. Progress made  Goal 4: Pt will complete abstract verbal reasoning tasks (similarities/differences, divergent naming) with 80% acc given min cues to promote effective communication of wants and needs.   Progress made  Goal 5: Pt will be educated on word finding strategies and will implement in structured tasks with 80% acc given cues as neded to promote effective communication of wants and needs. Progress made    Updated Short-Term Goals:   Goal 1: Pt will complete executive functioning task (med mgmt, calculations, calendars, time) with 80% acc given cues as needed to increase safety with the completion of IADLs and ADLs. Goal 2: To decrease cognitive deficits and improve attention to tasks for safe completion of ADLs, pt will complete structured tasks addressing (sustained, selective, alternating, divided) attention with 80% accuracy and min cues. Goal 3: Pt will demonstrate auditory comprehension at the sentence/paragraph/conversation level with 80% acc with mild cues to promote pt's ability to promote safely preform ADLs within home environment. Goal 4: Pt will complete abstract verbal reasoning tasks (similarities/differences, divergent naming) with 80% acc given min cues to promote effective communication of wants and needs. Goal 5: Pt will be educated on word finding strategies and will implement in structured tasks with 80% acc given cues as neded to promote effective communication of wants and needs. Goal 6: Pt will implement cognitive and executive functioning time management strategies at home and in structured tasks with speech therapist with mild verbal cues      Frequency/Duration of Treatment:   Days: 1 day/week  Length of Session:  45 minutes and 60 minutes  Weeks: 6 Weeks and 8 Weeks    Rehab Potential: Good    Prognostic Factors: Motivation  Family/community support  Participation level  Previous level of function       Goal Status: Did Not Achieve       Patient Status: Continue per initial Plan of Care    Discharge Plan: Re-assess continued need for skilled therapeutic services at the end of this plan of care. This patients condition is expected to improve within the treatment timeframe.     MODIFIED VIZCAINO FALL RISK ASSESSMENT:    History of Falling (has patient fallen in the past 30 days?):    No (0 points)    Secondary Diagnosis (is there more than 1 medical diagnosis in patients medical history?):    No (0 points)    Ambulatory Aid:    No device is used (0 points)    Gait:    Normal/bedrest/wheelchair (0 points)    Mental Status:    Oriented to own ability (0 points)      Total points = 0     Fall Risk Level: low    0 - 24: Low Risk - implement low risk fall prevention interventions    25 - 44: Medium risk  45 and higher: High Risk        Electronically signed by:  Electronically signed by AMAURI Epperson on 1/10/2023 at 12:24 PM        If you have any questions or concerns, please don't hesitate to call.   Thank you for your referral.      Physician Signature:________________________________Date:__________________  By signing above, therapists plan is approved by physician

## 2023-01-16 ENCOUNTER — OFFICE VISIT (OUTPATIENT)
Dept: NEUROLOGY | Age: 73
End: 2023-01-16
Payer: MEDICARE

## 2023-01-16 VITALS
BODY MASS INDEX: 32.72 KG/M2 | HEART RATE: 98 BPM | WEIGHT: 184.7 LBS | SYSTOLIC BLOOD PRESSURE: 150 MMHG | DIASTOLIC BLOOD PRESSURE: 88 MMHG

## 2023-01-16 DIAGNOSIS — G44.311 INTRACTABLE ACUTE POST-TRAUMATIC HEADACHE: ICD-10-CM

## 2023-01-16 DIAGNOSIS — S06.0X0A CONCUSSION WITHOUT LOSS OF CONSCIOUSNESS, INITIAL ENCOUNTER: Primary | ICD-10-CM

## 2023-01-16 DIAGNOSIS — S09.90XD CLOSED HEAD INJURY, SUBSEQUENT ENCOUNTER: ICD-10-CM

## 2023-01-16 DIAGNOSIS — H81.91 VESTIBULOPATHY OF RIGHT EAR: ICD-10-CM

## 2023-01-16 DIAGNOSIS — R27.0 ATAXIA: ICD-10-CM

## 2023-01-16 DIAGNOSIS — D36.10 NEUROMA: ICD-10-CM

## 2023-01-16 PROCEDURE — 99214 OFFICE O/P EST MOD 30 MIN: CPT | Performed by: PSYCHIATRY & NEUROLOGY

## 2023-01-16 PROCEDURE — 3017F COLORECTAL CA SCREEN DOC REV: CPT | Performed by: PSYCHIATRY & NEUROLOGY

## 2023-01-16 PROCEDURE — G8399 PT W/DXA RESULTS DOCUMENT: HCPCS | Performed by: PSYCHIATRY & NEUROLOGY

## 2023-01-16 PROCEDURE — 1090F PRES/ABSN URINE INCON ASSESS: CPT | Performed by: PSYCHIATRY & NEUROLOGY

## 2023-01-16 PROCEDURE — G8484 FLU IMMUNIZE NO ADMIN: HCPCS | Performed by: PSYCHIATRY & NEUROLOGY

## 2023-01-16 PROCEDURE — G8427 DOCREV CUR MEDS BY ELIG CLIN: HCPCS | Performed by: PSYCHIATRY & NEUROLOGY

## 2023-01-16 PROCEDURE — 1036F TOBACCO NON-USER: CPT | Performed by: PSYCHIATRY & NEUROLOGY

## 2023-01-16 PROCEDURE — G8417 CALC BMI ABV UP PARAM F/U: HCPCS | Performed by: PSYCHIATRY & NEUROLOGY

## 2023-01-16 PROCEDURE — 1123F ACP DISCUSS/DSCN MKR DOCD: CPT | Performed by: PSYCHIATRY & NEUROLOGY

## 2023-01-16 RX ORDER — GABAPENTIN 100 MG/1
100 CAPSULE ORAL 3 TIMES DAILY
Qty: 90 CAPSULE | Refills: 3 | Status: SHIPPED | OUTPATIENT
Start: 2023-01-16 | End: 2023-02-15

## 2023-01-16 RX ORDER — BUSPIRONE HYDROCHLORIDE 10 MG/1
10 TABLET ORAL 2 TIMES DAILY
Qty: 60 TABLET | Refills: 1 | Status: SHIPPED | OUTPATIENT
Start: 2023-01-16 | End: 2023-02-15

## 2023-01-16 RX ORDER — LORAZEPAM 1 MG/1
1 TABLET ORAL ONCE
Qty: 1 TABLET | Refills: 0 | Status: SHIPPED | OUTPATIENT
Start: 2023-01-16 | End: 2023-01-16

## 2023-01-16 ASSESSMENT — ENCOUNTER SYMPTOMS
COLOR CHANGE: 0
NAUSEA: 0
CHOKING: 0
PHOTOPHOBIA: 0
BACK PAIN: 0
SHORTNESS OF BREATH: 0
TROUBLE SWALLOWING: 0
VOMITING: 0

## 2023-01-16 NOTE — PROGRESS NOTES
Subjective:      Patient ID: Vickey Kern is a 67 y.o. female who presents today for:  Chief Complaint   Patient presents with    Follow-up     Pt stated she is being treated for traumatic brain injury. Pt is getting head pain in the back of her head where the head injury was, pt stated her concussion doctor said she has no range of motion. Pt states no vision loss she has noticed, pts daughter stated she is overly fatigued and also noticed delay of information processing. Pt is in speech therapy for the cognitive issues she is having,. HPI 67 right-handed female who is here for evaluation of headaches. Patient had a concussion in October 2022 when her headaches increase. She has dizziness and vestibulopathy as well. Patient continues on topiramate. Patient had quite a concussion looking at her pictures. When last seen we had recommended continued therapy. Patient still has had pain and the pain back of her head patient is fatigued. She is undergoing speech therapy. Patient still continues to have concussive symptoms. She is still quite irritable at noise and people around her. She has difficulty concentrating. Her main issues appears to be headaches which are posterior in the back where she had and not in a gash. This is a neuroma. Past Medical History:   Diagnosis Date    Sinus infection      No past surgical history on file. Social History     Socioeconomic History    Marital status:       Spouse name: Not on file    Number of children: Not on file    Years of education: Not on file    Highest education level: Not on file   Occupational History    Not on file   Tobacco Use    Smoking status: Never    Smokeless tobacco: Never   Substance and Sexual Activity    Alcohol use: Yes    Drug use: No    Sexual activity: Never   Other Topics Concern    Not on file   Social History Narrative    Not on file     Social Determinants of Health     Financial Resource Strain: Low Risk Difficulty of Paying Living Expenses: Not hard at all   Food Insecurity: No Food Insecurity    Worried About Running Out of Food in the Last Year: Never true    Ran Out of Food in the Last Year: Never true   Transportation Needs: Not on file   Physical Activity: Inactive    Days of Exercise per Week: 0 days    Minutes of Exercise per Session: 0 min   Stress: Not on file   Social Connections: Not on file   Intimate Partner Violence: Not on file   Housing Stability: Not on file     No family history on file. Allergies   Allergen Reactions    Penicillins Hives       Current Outpatient Medications   Medication Sig Dispense Refill    busPIRone (BUSPAR) 10 MG tablet Take 1 tablet by mouth 2 times daily 60 tablet 1    gabapentin (NEURONTIN) 100 MG capsule Take 1 capsule by mouth 3 times daily for 30 days. 90 capsule 3    LORazepam (ATIVAN) 1 MG tablet Take 1 tablet by mouth once for 1 dose. For mri Max Daily Amount: 1 mg 1 tablet 0    topiramate (TOPAMAX) 50 MG tablet TAKE 1 TABLET BY MOUTH EVERY NIGHT 90 tablet 2    meloxicam (MOBIC) 15 MG tablet Take 15 mg by mouth daily      buPROPion (WELLBUTRIN XL) 300 MG extended release tablet Take 1 tablet by mouth every morning (Patient not taking: Reported on 1/16/2023) 90 tablet 2    PARoxetine (PAXIL) 10 MG tablet Take 1 tablet by mouth daily (Patient not taking: Reported on 1/16/2023) 90 tablet 3     No current facility-administered medications for this visit. Review of Systems   Constitutional:  Negative for fever. HENT:  Negative for ear pain, tinnitus and trouble swallowing. Eyes:  Negative for photophobia and visual disturbance. Respiratory:  Negative for choking and shortness of breath. Cardiovascular:  Negative for chest pain and palpitations. Gastrointestinal:  Negative for nausea and vomiting. Musculoskeletal:  Positive for gait problem, myalgias and neck pain. Negative for back pain, joint swelling and neck stiffness.    Skin:  Negative for color change. Allergic/Immunologic: Negative for food allergies. Neurological:  Positive for light-headedness and headaches. Negative for dizziness, tremors, seizures, syncope, facial asymmetry, speech difficulty, weakness and numbness. Psychiatric/Behavioral:  Negative for behavioral problems, confusion, hallucinations and sleep disturbance. Objective:   BP (!) 150/88 (Site: Right Upper Arm, Position: Sitting, Cuff Size: Medium Adult)   Pulse 98   Wt 184 lb 11.2 oz (83.8 kg)   BMI 32.72 kg/m²     Physical Exam  Vitals reviewed. Eyes:      Pupils: Pupils are equal, round, and reactive to light. Cardiovascular:      Rate and Rhythm: Normal rate and regular rhythm. Heart sounds: No murmur heard. Pulmonary:      Effort: Pulmonary effort is normal.      Breath sounds: Normal breath sounds. Abdominal:      General: Bowel sounds are normal.   Musculoskeletal:         General: Normal range of motion. Cervical back: Normal range of motion. Skin:     General: Skin is warm. Neurological:      Mental Status: She is alert and oriented to person, place, and time. Cranial Nerves: No cranial nerve deficit. Sensory: No sensory deficit. Motor: No abnormal muscle tone. Coordination: Coordination normal.      Deep Tendon Reflexes: Reflexes are normal and symmetric. Babinski sign absent on the right side. Babinski sign absent on the left side. Comments: Neck examination appears to be normal and she is not myelopathic. She has a point tenderness over the gash that she had which was repaired. Psychiatric:         Mood and Affect: Mood normal.       No results found.     Lab Results   Component Value Date/Time    WBC 5.0 03/31/2013 01:22 PM    RBC 4.44 03/31/2013 01:22 PM    HGB 13.8 03/31/2013 01:22 PM    HCT 42.1 03/31/2013 01:22 PM    MCV 94.8 03/31/2013 01:22 PM    MCH 31.1 03/31/2013 01:22 PM    MCHC 32.8 03/31/2013 01:22 PM    RDW 12.9 03/31/2013 01:22 PM     03/31/2013 01:22 PM    MPV 9.6 03/31/2013 01:22 PM     Lab Results   Component Value Date/Time     11/03/2022 09:52 AM    K 4.6 11/03/2022 09:52 AM     11/03/2022 09:52 AM    CO2 27 11/03/2022 09:52 AM    BUN 15 11/03/2022 09:52 AM    CREATININE 0.67 11/03/2022 09:52 AM    GFRAA >60.0 05/17/2021 10:45 AM    LABGLOM >60.0 11/03/2022 09:52 AM    GLUCOSE 112 11/03/2022 09:52 AM    PROT 6.6 11/03/2022 09:52 AM    LABALBU 4.2 11/03/2022 09:52 AM    CALCIUM 9.2 11/03/2022 09:52 AM    BILITOT 0.5 11/03/2022 09:52 AM    ALKPHOS 82 11/03/2022 09:52 AM    AST 29 11/03/2022 09:52 AM    ALT 24 11/03/2022 09:52 AM     No results found for: PROTIME, INR  Lab Results   Component Value Date/Time    TSH 3.240 05/17/2021 10:45 AM     Lab Results   Component Value Date/Time    TRIG 66 11/03/2022 09:52 AM    HDL 95 11/03/2022 09:52 AM    LDLCALC 113 11/03/2022 09:52 AM     No results found for: [facility], CANNAB, COCAINESCRN, LABMETH, OPIATESCREENURINE, PHENCYCLIDINESCREENURINE, PPXUR, ETOH  No results found for: LITHIUM, DILFRTOT, VALPROATE    Assessment:       Diagnosis Orders   1. Concussion without loss of consciousness, initial encounter        2. Intractable acute post-traumatic headache  MRI BRAIN WO CONTRAST    LORazepam (ATIVAN) 1 MG tablet      3. Ataxia        4. Vestibulopathy of right ear        5. Closed head injury, subsequent encounter        6. Neuroma        Intractable concussive symptoms which has shown some improvement but patient still has considerable residuals. This may go on for a while. When difficult to certain the timeframe of events. She is not developed pain in the back of her head where she may have developed a neuroma from the blood clot. I have recommended an MRI of the brain to make sure there is no subdural hematoma and recommended Neurontin for the neuralgic pain. Patient also may benefit from buspirone as she has developed considerable anxiety with this.   We will try her on this she is not taking Wellbutrin anymore. I did discuss with her at length that there is no timeframe for such improvements and if anxiety is a big with this then we may need to treat it    Patient is followed by pain management and an injection in the neck may help as if this is cervicogenic pain this may be of value  Dhruv R. Monico Nageotte, MD, ANASTASIA  Diplomate, American Board of Psychiatry & Neurology  Board Certified in Vascular Neurology  Board Certified in Neuromuscular Medicine  Certified in [facility]:      Orders Placed This Encounter   Procedures    MRI BRAIN WO CONTRAST     Standing Status:   Future     Standing Expiration Date:   1/16/2024     Order Specific Question:   Reason for exam:     Answer:   headaches     Orders Placed This Encounter   Medications    busPIRone (BUSPAR) 10 MG tablet     Sig: Take 1 tablet by mouth 2 times daily     Dispense:  60 tablet     Refill:  1    gabapentin (NEURONTIN) 100 MG capsule     Sig: Take 1 capsule by mouth 3 times daily for 30 days. Dispense:  90 capsule     Refill:  3    LORazepam (ATIVAN) 1 MG tablet     Sig: Take 1 tablet by mouth once for 1 dose. For mri Max Daily Amount: 1 mg     Dispense:  1 tablet     Refill:  0       Return in about 4 months (around 5/16/2023).       Adair Diaz MD

## 2023-01-19 ENCOUNTER — APPOINTMENT (OUTPATIENT)
Dept: SPEECH THERAPY | Age: 73
End: 2023-01-19
Payer: MEDICARE

## 2023-01-19 PROCEDURE — 97130 THER IVNTJ EA ADDL 15 MIN: CPT

## 2023-01-19 PROCEDURE — 97129 THER IVNTJ 1ST 15 MIN: CPT

## 2023-01-19 NOTE — PROGRESS NOTES
[facility]  Speech Language Pathology  Adult Daily Note    Flakita Elias  : 1950  [x]   confirmed      Date: 2023    Visit Information:  Visit Information  SLP Insurance Information: [facility] Medicare  Total # of Visits Approved:  (Medical Necessity)  Total # of Visits to Date: 3  No Show: 0  Canceled Appointment: 0     Plan of care signed (Y/N):     Yes    Certification SYOSJT:-  Plan of Care Visit #1      Interventions used this date:  Cognitive Skill Development and Instruction in Compensatory Strategies    Subjective: Pt was cooperative with tasks this date. Pt reported she went to Dr. Kin Padilla last 23 and he saw a neuroma. He also started her on a new anxiety medication Buspirone and then gabapentin for pain and is scheduled for an MRI. Behavior:  Alert and Cooperative         Objective/Assessment:   Patient progressing towards goals:   Goal 1: Pt will complete executive functioning task (med mgmt, calculations, calendars, time) with 80% acc given cues as needed to increase safety with the completion of IADLs and ADLs. Pt completed a mid level time management word problems with 75 % acc Ind and 100% acc standby cues    Goal 2: To decrease cognitive deficits and improve attention to tasks for safe completion of ADLs, pt will complete structured tasks addressing (sustained, selective, alternating, divided) attention with 80% accuracy and min cues. Not targeted     Goal 3: Pt will demonstrate auditory comprehension at the sentence/paragraph/conversation level with 80% acc with mild cues to promote pt's ability to promote safely preform ADLs within home environment. Not targeted     Goal 4: Pt will complete abstract verbal reasoning tasks (similarities/differences, divergent naming) with 80% acc given min cues to promote effective communication of wants and needs.   Pt stated similarities/differences: similarities 80% acc min cues and differences 80% acc min cues    Goal 5: Pt will be educated on word finding strategies and will implement in structured tasks with 80% acc given cues as neded to promote effective communication of wants and needs. Goal 6: Pt will implement cognitive and executive functioning time management strategies at home and in structured tasks with speech therapist with mild verbal cues  Pt used thought notepad at home to keep track of tasks and crossed them off and then after 3 tasks she needed a break    Pt reported that her daughter helped her put in reminders on iPad and phone for bills and appts. Pain Assessment:  Initial Assessment:  Patient does not c/o pain. Re-assessment:  Patient does not c/o pain. Plan:  Continue with current goals    Patient/Caregiver Education:  Patient/Caregiver educated on session. Patient/Caregiver provided with home program: review packet of concussion symptom management given by SLP  Patient/Caregiver stated verbal understanding of directions.     Time in: 0836  Time out: 0915  Minutes seen: 44  *Pt seen upon arrival    Signature: Electronically signed by AMAURI Andersen on 1/19/2023 at 9:20 AM

## 2023-01-24 ENCOUNTER — APPOINTMENT (OUTPATIENT)
Dept: SPEECH THERAPY | Age: 73
End: 2023-01-24
Payer: MEDICARE

## 2023-01-24 PROCEDURE — 92507 TX SP LANG VOICE COMM INDIV: CPT

## 2023-01-24 NOTE — PROGRESS NOTES
[facility]  Speech Language Pathology  Adult Daily Note    Sebastien Whatley  : 1950  [x]   confirmed      Date: 2023    Visit Information:  Visit Information  SLP Insurance Information: [facility] Medicare  Total # of Visits to Date: 4  No Show: 0  Canceled Appointment: 0          Plan of care signed (Y/N):     Yes    Certification [id number]:-  Plan of Care Visit #1      Interventions used this date:  Cognitive Skill Development and Instruction in Compensatory Strategies    Subjective: Pt was cooperative with tasks this date. Pt participated well throughout the session. Pt reports she has an MRI scheduled for this coming Friday. Pt wore sunglasses in the room to eliminating some distraction from the bright lights. Post session, pt confirmed next session at the new location. Pt stopped at secretaries desk in order to make more appointments. Behavior:  Alert and Cooperative         Objective/Assessment:   Patient progressing towards goals:   Goal 1: Pt will complete executive functioning task (med mgmt, calculations, calendars, time) with 80% acc given cues as needed to increase safety with the completion of IADLs and ADLs. --Not targeted    Goal 2: To decrease cognitive deficits and improve attention to tasks for safe completion of ADLs, pt will complete structured tasks addressing (sustained, selective, alternating, divided) attention with 80% accuracy and min cues. Not directly targeted. However, pt was able to sustain attention to each task with no difficulties this date. Goal 3: Pt will demonstrate auditory comprehension at the sentence/paragraph/conversation level with 80% acc with mild cues to promote pt's ability to promote safely preform ADLs within home environment.   Not targeted     Goal 4: Pt will complete abstract verbal reasoning tasks (similarities/differences, divergent naming) with 80% acc given min cues to promote effective communication of wants and needs. Pt completed divergent naming task with 90% accuracy independently and increased to 100% accuracy following min verbal cues. Two more sessions before goal is met. Pt completed similarities/differences task with 80% accuracy independently and increased to 100% accuracy following min verbal cues. Two more sessions before goal is met. Goal 5: Pt will be educated on word finding strategies and will implement in structured tasks with 80% acc given cues as neded to promote effective communication of wants and needs. --Pt recalled strategies of circumlocution from a previous session. Pt was able to describe the strategy to SLP, but was unable to recall the name. Pt completed word finding task using the strategy of circumlocution with 80% accuracy independently. She increased to 100% accuracy with min verbal cues provided. Two more sessions before goal is met. Goal 6: Pt will implement cognitive and executive functioning time management strategies at home and in structured tasks with speech therapist with mild verbal cues--Not targeted. Pain Assessment:  Initial Assessment:  Patient does not c/o pain. Re-assessment:  Patient does not c/o pain. Plan:  Continue with current goals    Patient/Caregiver Education:  Patient/Caregiver educated on session. Patient/Caregiver provided with home program: none this date  Patient/Caregiver stated verbal understanding of directions.     Time in: 0905- late-seen upon arrival  Time out: Shana 30  Minutes seen: 45      Signature: Electronically signed by AMAURI Wilkerson on 1/24/2023 at 9:57 AM

## 2023-01-27 ENCOUNTER — HOSPITAL ENCOUNTER (OUTPATIENT)
Dept: MRI IMAGING | Age: 73
End: 2023-01-27
Payer: MEDICARE

## 2023-01-27 DIAGNOSIS — G44.311 INTRACTABLE ACUTE POST-TRAUMATIC HEADACHE: ICD-10-CM

## 2023-01-27 PROCEDURE — 70551 MRI BRAIN STEM W/O DYE: CPT

## 2023-01-30 ENCOUNTER — TELEPHONE (OUTPATIENT)
Dept: NEUROLOGY | Age: 73
End: 2023-01-30

## 2023-01-30 ENCOUNTER — PATIENT MESSAGE (OUTPATIENT)
Dept: NEUROLOGY | Age: 73
End: 2023-01-30

## 2023-01-30 DIAGNOSIS — S06.0X0S CONCUSSION WITHOUT LOSS OF CONSCIOUSNESS, SEQUELA (HCC): Primary | ICD-10-CM

## 2023-01-30 NOTE — TELEPHONE ENCOUNTER
Bill message from pt:     I would like explanation from my MRI. I would like in person appt. I would appreciate not waiting an hour and only seeing you for 15 mins making me feel like my issue is not that important.    Thank you    Pt is scheduled to be seen 02/27/2023

## 2023-01-31 ENCOUNTER — APPOINTMENT (OUTPATIENT)
Dept: SPEECH THERAPY | Age: 73
End: 2023-01-31
Payer: MEDICARE

## 2023-01-31 PROCEDURE — 97130 THER IVNTJ EA ADDL 15 MIN: CPT

## 2023-01-31 PROCEDURE — 97129 THER IVNTJ 1ST 15 MIN: CPT

## 2023-01-31 NOTE — PROGRESS NOTES
[facility]  Speech Language Pathology  Adult Daily Note    Amauri Downs  : 1950  [x]   confirmed      Date: 2023    Visit Information:  Visit Information  SLP Insurance Information: [facility] Medicare  Total # of Visits to Date: 5  No Show: 0  Canceled Appointment: 0      Plan of care signed (Y/N):     Yes    Certification [id number]-  Plan of Care Visit #5      Interventions used this date:  Cognitive Skill Development and Instruction in Compensatory Strategies    Subjective:   Pt had MRI completed last Friday. Pt stated the MRI indicated there was brain swelling and Dr. Nava Carpenter referred her to a neurosurgeon. Pt stated she will \"go to him to listen, but has no intention of doing anything else. \"      Pt denied pain this date. Pt was observed to put her sunglasses on [employer] through the session as the \"lights are too harsh and are bothering me. \"      Pt participated well throughout the session. Post session, pt confirmed next weeks appointment on Thursday at 2:00pm.    Behavior:  Alert and Cooperative         Objective/Assessment:   Patient progressing towards goals:   Goal 1: Pt will complete executive functioning task (med mgmt, calculations, calendars, time) with 80% acc given cues as needed to increase safety with the completion of IADLs and ADLs.--  Pt completed concrete scheduling calendar with 93% accuracy independently and increased to 100% accuracy following min verbal cues  Pt completed abstract scheduling calendar with 83% accuracy independently and increased to 100% accuracy following min verbal cues. Goal 2: To decrease cognitive deficits and improve attention to tasks for safe completion of ADLs, pt will complete structured tasks addressing (sustained, selective, alternating, divided) attention with 80% accuracy and min cues. Pt sustained attention to task with 100% accuracy following min verbal cues.     Goal 3: Pt will demonstrate auditory comprehension at the sentence/paragraph/conversation level with 80% acc with mild cues to promote pt's ability to promote safely preform ADLs within home environment. Not targeted     Goal 4: Pt will complete abstract verbal reasoning tasks (similarities/differences, divergent naming) with 80% acc given min cues to promote effective communication of wants and needs. Pt completed similarities/differences task with 81% accuracy independently. She increased to 100% accuracy following min verbal cues. One more session for similarities/differences. Goal 5: Pt will be educated on word finding strategies and will implement in structured tasks with 80% acc given cues as neded to promote effective communication of wants and needs. --Not targeted  Two more sessions before goal is met. Goal 6: Pt will implement cognitive and executive functioning time management strategies at home and in structured tasks with speech therapist with mild verbal cues--Completed executive functioning task with 45% accuracy independently and increased to 75% accuracy following min verbal cues. Required moderate verbal cues in order to increase to 100% accuracy. Pain Assessment:  Initial Assessment:  Patient does not c/o pain. Re-assessment:  Patient does not c/o pain. Plan:  Continue with current goals    Patient/Caregiver Education:  Patient/Caregiver educated on session. Patient/Caregiver provided with home program: executive functioning worksheet x2  Patient/Caregiver stated verbal understanding of directions.     Time in: 0901- late-seen upon arrival  Time out: Shana 30  Minutes seen: 52      Signature: Electronically signed by AMAURI Angel on 1/31/2023 at 10:39 AM

## 2023-02-06 ENCOUNTER — OFFICE VISIT (OUTPATIENT)
Dept: FAMILY MEDICINE CLINIC | Age: 73
End: 2023-02-06
Payer: MEDICARE

## 2023-02-06 VITALS
HEIGHT: 63 IN | SYSTOLIC BLOOD PRESSURE: 128 MMHG | DIASTOLIC BLOOD PRESSURE: 80 MMHG | HEART RATE: 72 BPM | WEIGHT: 185 LBS | BODY MASS INDEX: 32.78 KG/M2

## 2023-02-06 DIAGNOSIS — S06.9X0D TRAUMATIC BRAIN INJURY, WITHOUT LOSS OF CONSCIOUSNESS, SUBSEQUENT ENCOUNTER: Primary | ICD-10-CM

## 2023-02-06 DIAGNOSIS — F33.0 MAJOR DEPRESSIVE DISORDER, RECURRENT, MILD (HCC): ICD-10-CM

## 2023-02-06 PROCEDURE — G8417 CALC BMI ABV UP PARAM F/U: HCPCS | Performed by: NURSE PRACTITIONER

## 2023-02-06 PROCEDURE — 1090F PRES/ABSN URINE INCON ASSESS: CPT | Performed by: NURSE PRACTITIONER

## 2023-02-06 PROCEDURE — G8399 PT W/DXA RESULTS DOCUMENT: HCPCS | Performed by: NURSE PRACTITIONER

## 2023-02-06 PROCEDURE — 1036F TOBACCO NON-USER: CPT | Performed by: NURSE PRACTITIONER

## 2023-02-06 PROCEDURE — G8484 FLU IMMUNIZE NO ADMIN: HCPCS | Performed by: NURSE PRACTITIONER

## 2023-02-06 PROCEDURE — 99214 OFFICE O/P EST MOD 30 MIN: CPT | Performed by: NURSE PRACTITIONER

## 2023-02-06 PROCEDURE — 3017F COLORECTAL CA SCREEN DOC REV: CPT | Performed by: NURSE PRACTITIONER

## 2023-02-06 PROCEDURE — 1123F ACP DISCUSS/DSCN MKR DOCD: CPT | Performed by: NURSE PRACTITIONER

## 2023-02-06 PROCEDURE — G8427 DOCREV CUR MEDS BY ELIG CLIN: HCPCS | Performed by: NURSE PRACTITIONER

## 2023-02-06 RX ORDER — LORAZEPAM 1 MG/1
TABLET ORAL
COMMUNITY
Start: 2023-01-16 | End: 2023-02-06 | Stop reason: ALTCHOICE

## 2023-02-06 SDOH — ECONOMIC STABILITY: INCOME INSECURITY: HOW HARD IS IT FOR YOU TO PAY FOR THE VERY BASICS LIKE FOOD, HOUSING, MEDICAL CARE, AND HEATING?: NOT HARD AT ALL

## 2023-02-06 SDOH — ECONOMIC STABILITY: FOOD INSECURITY: WITHIN THE PAST 12 MONTHS, THE FOOD YOU BOUGHT JUST DIDN'T LAST AND YOU DIDN'T HAVE MONEY TO GET MORE.: NEVER TRUE

## 2023-02-06 SDOH — ECONOMIC STABILITY: FOOD INSECURITY: WITHIN THE PAST 12 MONTHS, YOU WORRIED THAT YOUR FOOD WOULD RUN OUT BEFORE YOU GOT MONEY TO BUY MORE.: NEVER TRUE

## 2023-02-06 SDOH — ECONOMIC STABILITY: HOUSING INSECURITY
IN THE LAST 12 MONTHS, WAS THERE A TIME WHEN YOU DID NOT HAVE A STEADY PLACE TO SLEEP OR SLEPT IN A SHELTER (INCLUDING NOW)?: NO

## 2023-02-06 ASSESSMENT — PATIENT HEALTH QUESTIONNAIRE - PHQ9
1. LITTLE INTEREST OR PLEASURE IN DOING THINGS: 0
SUM OF ALL RESPONSES TO PHQ9 QUESTIONS 1 & 2: 1
7. TROUBLE CONCENTRATING ON THINGS, SUCH AS READING THE NEWSPAPER OR WATCHING TELEVISION: 1
9. THOUGHTS THAT YOU WOULD BE BETTER OFF DEAD, OR OF HURTING YOURSELF: 0
5. POOR APPETITE OR OVEREATING: 0
2. FEELING DOWN, DEPRESSED OR HOPELESS: 1
SUM OF ALL RESPONSES TO PHQ QUESTIONS 1-9: 4
6. FEELING BAD ABOUT YOURSELF - OR THAT YOU ARE A FAILURE OR HAVE LET YOURSELF OR YOUR FAMILY DOWN: 0
3. TROUBLE FALLING OR STAYING ASLEEP: 1
4. FEELING TIRED OR HAVING LITTLE ENERGY: 1
10. IF YOU CHECKED OFF ANY PROBLEMS, HOW DIFFICULT HAVE THESE PROBLEMS MADE IT FOR YOU TO DO YOUR WORK, TAKE CARE OF THINGS AT HOME, OR GET ALONG WITH OTHER PEOPLE: 0
SUM OF ALL RESPONSES TO PHQ QUESTIONS 1-9: 4
8. MOVING OR SPEAKING SO SLOWLY THAT OTHER PEOPLE COULD HAVE NOTICED. OR THE OPPOSITE, BEING SO FIGETY OR RESTLESS THAT YOU HAVE BEEN MOVING AROUND A LOT MORE THAN USUAL: 0
SUM OF ALL RESPONSES TO PHQ QUESTIONS 1-9: 4
SUM OF ALL RESPONSES TO PHQ QUESTIONS 1-9: 4

## 2023-02-09 ENCOUNTER — HOSPITAL ENCOUNTER (OUTPATIENT)
Dept: SPEECH THERAPY | Age: 73
Setting detail: THERAPIES SERIES
Discharge: HOME | End: 2023-02-09
Payer: MEDICARE

## 2023-02-09 PROCEDURE — 97130 THER IVNTJ EA ADDL 15 MIN: CPT

## 2023-02-09 PROCEDURE — 97129 THER IVNTJ 1ST 15 MIN: CPT

## 2023-02-09 NOTE — PROGRESS NOTES
[facility]  Speech Language Pathology  Adult Daily Note    Charla Pickering  : 1950  [x]   confirmed      Date: 2023    Visit Information:  Visit Information  SLP Insurance Information: [facility] Medicare  Total # of Visits to Date: 6  No Show: 0  Canceled Appointment: 0    Plan of care signed (Y/N):     Yes    Certification [id number]:-  Plan of Care Visit #6      Interventions used this date:  Cognitive Skill Development and Instruction in Compensatory Strategies    Subjective:   Pt stated she went to the [address] show with her daughter over the weekend. Pt reported she wore sunglasses for the entirety of the trip and requested several rest breaks to sit down. However, pt reported she \"was glad she went. \"    Pt came with completed homework scoring 86% accuracy. Pt was able to increase to 100% accuracy following min verbal cues. Pt participated well throughout the session. Post session, SLP had pt stop at the  to schedule. Behavior:  Alert and Cooperative         Objective/Assessment:   Patient progressing towards goals:   Goal 1: Pt will complete executive functioning task (med mgmt, calculations, calendars, time) with 80% acc given cues as needed to increase safety with the completion of IADLs and ADLs.--  Pt completed functional math questions relating to calculations with 80% accuracy independently and increased to 100% accuracy with min to mod verbal cues provided. Two more sessions before goal is met. Goal 2: To decrease cognitive deficits and improve attention to tasks for safe completion of ADLs, pt will complete structured tasks addressing (sustained, selective, alternating, divided) attention with 80% accuracy and min cues. Pt sustained attention to task with 100% accuracy following min verbal cues. This is consistent with last session. One more session before goal is met.     Goal 3: Pt will demonstrate auditory comprehension at the sentence/paragraph/conversation level with 80% acc with mild cues to promote pt's ability to promote safely preform ADLs within home environment. Not targeted     Goal 4: Pt will complete abstract verbal reasoning tasks (similarities/differences, divergent naming) with 80% acc given min cues to promote effective communication of wants and needs. Not targeted. Goal 5: Pt will be educated on word finding strategies and will implement in structured tasks with 80% acc given cues as neded to promote effective communication of wants and needs. --Not targeted  Two more sessions before goal is met. Goal 6: Pt will implement cognitive and executive functioning time management strategies at home and in structured tasks with speech therapist with mild verbal cues--Completed executive functioning task relating to time management with 75% accuracy independently and increased to 100% accuracy following min to mod verbal cues. Pain Assessment:  Initial Assessment:  Patient does not c/o pain. Re-assessment:  Patient does not c/o pain. Plan:  Continue with current goals    Patient/Caregiver Education:  Patient/Caregiver educated on session. Patient/Caregiver provided with home program: executive functioning worksheet x2  Patient/Caregiver stated verbal understanding of directions.     Time in: 1400-   Time out: 1500  Minutes seen: 60  Co min      Signature: Electronically signed by AMAURI Lang on 2023 at 3:53 PM

## 2023-02-13 ASSESSMENT — ENCOUNTER SYMPTOMS
CONSTIPATION: 0
VOMITING: 0
ALLERGIC/IMMUNOLOGIC NEGATIVE: 1
ANAL BLEEDING: 0
RECTAL PAIN: 0
ABDOMINAL PAIN: 0
BLURRED VISION: 1
BLOOD IN STOOL: 0
GASTROINTESTINAL NEGATIVE: 1
RESPIRATORY NEGATIVE: 1
VOICE CHANGE: 0
COLOR CHANGE: 0
TROUBLE SWALLOWING: 0
SHORTNESS OF BREATH: 0
DIARRHEA: 0

## 2023-02-14 NOTE — PROGRESS NOTES
Subjective  [employer], 67 y.o. female presents today with:  Chief Complaint   Patient presents with    3 Month Follow-Up           Depression    Check-Up        Head Injury   The incident occurred more than 1 week ago (July 25). The injury mechanism was a fall. There was no loss of consciousness. The volume of blood lost was moderate. The quality of the pain is described as throbbing. The pain is moderate. The pain has been constant since the injury. Associated symptoms include blurred vision and headaches. Pertinent negatives include no disorientation, memory loss, numbness, tinnitus, vomiting or weakness. Associated symptoms comments: dizziness. She has tried NSAIDs and acetaminophen (physical therapy, speech therapy) for the symptoms. The treatment provided mild relief. DepressionPatient is not experiencing: nervousness/anxiety, palpitations and shortness of breath. Review of Systems   Constitutional: Negative. Negative for activity change, appetite change, fatigue and unexpected weight change. HENT: Negative. Negative for dental problem, nosebleeds, tinnitus, trouble swallowing and voice change. Eyes:  Positive for blurred vision. Negative for visual disturbance. Respiratory: Negative. Negative for shortness of breath. Cardiovascular: Negative. Negative for chest pain, palpitations and leg swelling. Gastrointestinal: Negative. Negative for abdominal pain, anal bleeding, blood in stool, constipation, diarrhea, rectal pain and vomiting. Endocrine: Negative. Negative for cold intolerance, heat intolerance, polydipsia, polyphagia and polyuria. Genitourinary: Negative. Musculoskeletal: Negative. Skin: Negative. Negative for color change and rash. Allergic/Immunologic: Negative. Neurological:  Positive for dizziness, light-headedness and headaches. Negative for tremors, seizures, syncope, facial asymmetry, speech difficulty, weakness and numbness.    Hematological: Negative. Negative for adenopathy. Does not bruise/bleed easily. Psychiatric/Behavioral:  Negative for dysphoric mood, memory loss and sleep disturbance. The patient is not nervous/anxious. Past Medical History:   Diagnosis Date    Sinus infection      No past surgical history on file. Social History     Socioeconomic History    Marital status:      Spouse name: Not on file    Number of children: Not on file    Years of education: Not on file    Highest education level: Not on file   Occupational History    Not on file   Tobacco Use    Smoking status: Never    Smokeless tobacco: Never   Substance and Sexual Activity    Alcohol use: Yes    Drug use: No    Sexual activity: Never   Other Topics Concern    Not on file   Social History Narrative    Not on file     Social Determinants of Health     Financial Resource Strain: Low Risk     Difficulty of Paying Living Expenses: Not hard at all   Food Insecurity: No Food Insecurity    Worried About Running Out of Food in the Last Year: Never true    [address] in the Last Year: Never true   Transportation Needs: Unknown    Lack of Transportation (Medical): Not on file    Lack of Transportation (Non-Medical): No   Physical Activity: Inactive    Days of Exercise per Week: 0 days    Minutes of Exercise per Session: 0 min   Stress: Not on file   Social Connections: Not on file   Intimate Partner Violence: Not on file   Housing Stability: Unknown    Unable to Pay for Housing in the Last Year: Not on file    Number of Places Lived in the Last Year: Not on file    Unstable Housing in the Last Year: No     No family history on file. Allergies   Allergen Reactions    Penicillins Hives     Current Outpatient Medications   Medication Sig Dispense Refill    busPIRone (BUSPAR) 10 MG tablet Take 1 tablet by mouth 2 times daily 60 tablet 1    gabapentin (NEURONTIN) 100 MG capsule Take 1 capsule by mouth 3 times daily for 30 days.  90 capsule 3 No current facility-administered medications for this visit. PMH, Surgical Hx, Family Hx, and Social Hx reviewed and updated. Health Maintenance reviewed. Objective    Vitals:    02/06/23 0920   BP: 128/80   Pulse: 72   Weight: 185 lb (83.9 kg)   Height: 5' 3\" (1.6 m)       Physical Exam  Constitutional:       General: She is not in acute distress. Appearance: Normal appearance. She is well-developed. She is not ill-appearing, toxic-appearing or diaphoretic. HENT:      Head: Normocephalic and atraumatic. Right Ear: Tympanic membrane, ear canal and external ear normal.      Left Ear: Tympanic membrane, ear canal and external ear normal.      Nose: Nose normal.      Mouth/Throat:      Mouth: Mucous membranes are moist.   Eyes:      General: Lids are normal.      Extraocular Movements:      Right eye: No nystagmus. Left eye: No nystagmus. Conjunctiva/sclera: Conjunctivae normal.      Pupils: Pupils are equal, round, and reactive to light. Neck:      Thyroid: No thyroid mass or thyromegaly. Vascular: No carotid bruit or JVD. Trachea: Trachea normal. No tracheal deviation. Cardiovascular:      Rate and Rhythm: Normal rate and regular rhythm. Pulses: Normal pulses. Heart sounds: Normal heart sounds, S1 normal and S2 normal. No murmur heard. No gallop. Pulmonary:      Effort: Pulmonary effort is normal. No accessory muscle usage or respiratory distress. Breath sounds: Normal breath sounds. No decreased breath sounds, wheezing, rhonchi or rales. Abdominal:      General: Bowel sounds are normal. There is no distension. Palpations: Abdomen is soft. There is no hepatomegaly, splenomegaly or mass. Tenderness: There is no abdominal tenderness. Hernia: No hernia is present. Musculoskeletal:         General: Normal range of motion. Cervical back: Normal range of motion and neck supple.    Lymphadenopathy:      Head:      Right side of head: No submandibular, tonsillar, preauricular or posterior auricular adenopathy. Left side of head: No submandibular, tonsillar, preauricular or posterior auricular adenopathy. Cervical: No cervical adenopathy. Skin:     General: Skin is warm and dry. Capillary Refill: Capillary refill takes less than 2 seconds. Coloration: Skin is not pale. Neurological:      General: No focal deficit present. Mental Status: She is alert and oriented to person, place, and time. GCS: GCS eye subscore is 4. GCS verbal subscore is 5. GCS motor subscore is 6. Cranial Nerves: No cranial nerve deficit. Sensory: No sensory deficit. Motor: No weakness, tremor, atrophy, abnormal muscle tone or seizure activity. Coordination: Coordination normal.      Gait: Gait normal.      Deep Tendon Reflexes: Reflexes are normal and symmetric. Reflexes normal.   Psychiatric:         Mood and Affect: Mood normal.         Speech: Speech normal.         Behavior: Behavior normal. Behavior is cooperative. No evidence of parenchymal hemorrhages or contusions. No evidence of intra or extra-axial fluid collection is seen. Ventricles and sulci normal in size and configuration. No extra-axial collection. No cerebral edema or mass effect. Scattered areas of low attenuation visualized in the periventricular and subcortical white matter suggestive of chronic microvascular disease. Visualized paranasal sinuses are well aerated. Visualized mastoid air cells are well aerated. Right occipital scalp subcutaneous soft tissue swelling is seen. The calvarium is intact. Impression   No acute intracranial hemorrhage or mass effect. Right occipital scalp subcutaneous soft tissue swelling is seen.       8/8/2022  8:06 AM Michele, [facility] Incoming Radiant Results From [facility] Awad [place], APRN - CNP Results     No radiation information found for this patient  Narrative EXAMINATION: CT of the brain without contrast       HISTORY: Traumatic injury of head. Laceration of scalp. COMPARISON: Brain CT July 25, 2022       TECHNIQUE: Multiple contiguous axial images were obtained of the brain from the skull base through the vertex. Multiplanar reformats were obtained. FINDINGS:       Prominence of the sulci and ventricles compatible with mild generalized parenchymal volume loss. Areas of bilateral supratentorial white matter hypoattenuation are nonspecific but most likely related to chronic small vessel ischemic changes in a patient    of this age. Gray-white matter differentiation is preserved. No acute hemorrhage or abnormal extra-axial fluid collection. No mass effect or midline shift. The visualized paranasal sinuses and mastoid air cells are clear. Posterior scalp edema. Calvarium is intact. Impression       No acute intracranial process. All CT scans at this facility use dose modulation, iterative reconstruction, and/or weight based dosing when appropriate to reduce radiation dose to as low as reasonably achievable. Assessment & Plan   Juan Mckeon was seen today for 3 month follow-up, depression and check-up. Diagnoses and all orders for this visit:    Traumatic brain injury, without loss of consciousness, subsequent encounter    Major depressive disorder, recurrent, mild ([facility] [address]    No orders of the defined types were placed in this encounter. No orders of the defined types were placed in this encounter. Medications Discontinued During This Encounter   Medication Reason    buPROPion (WELLBUTRIN XL) 300 MG extended release tablet Therapy completed    PARoxetine (PAXIL) 10 MG tablet Therapy completed    LORazepam (ATIVAN) 1 MG tablet Therapy completed    meloxicam (MOBIC) 15 MG tablet Therapy completed    topiramate (TOPAMAX) 50 MG tablet Therapy completed     Return in about 6 months (around 8/6/2023).       Reviewed with the patient: current clinical status, medications, activities and diet. Side effects, adverse effects of the medication prescribed today, as well as treatment plan/ rationale and result expectations have been discussed with the patient who expresses understanding and desires to proceed. Close follow up to evaluate treatment results and for coordination of care. I have reviewed the patient's medical history in detail and updated the computerized patient record.     Norma Cruz, APRN - CNP

## 2023-02-15 ENCOUNTER — HOSPITAL ENCOUNTER (OUTPATIENT)
Dept: SPEECH THERAPY | Age: 73
Setting detail: THERAPIES SERIES
Discharge: HOME | End: 2023-02-15
Payer: MEDICARE

## 2023-02-15 PROCEDURE — 97130 THER IVNTJ EA ADDL 15 MIN: CPT

## 2023-02-15 PROCEDURE — 97129 THER IVNTJ 1ST 15 MIN: CPT

## 2023-02-15 NOTE — PROGRESS NOTES
[facility]  Speech Language Pathology  Adult Daily Note    Clevester Favor  : 1950  [x]   confirmed      Date: 2/15/2023    Visit Information:  Visit Information  SLP Insurance Information: [facility] Medicare  Total # of Visits Approved:  (Medical Necessity)  Total # of Visits to Date: 7  No Show: 0  Canceled Appointment: 0    Plan of care signed (Y/N):     Yes    Certification Period: 2023-3/9/2023  Plan of Care Visit #7      Interventions used this date:  Cognitive Skill Development and Instruction in Compensatory Strategies    Subjective:   Pt arrived alone. She reported she began going to a chiropractor and feels \"much better\" after initial adjustment. Pt stated she will continue to go to the chiropractor at least 2x per week. SLP inquired about changed time and if pt would like 30 min appts. She stated she would prefer 45 min appts as it \"would challenge\" her \"brain\". SLP to inform . Pt would prefer late morning of afternoon. Behavior:  Alert and Cooperative         Objective/Assessment:   Patient progressing towards goals:   Goal 1: Pt will complete executive functioning task (med mgmt, calculations, calendars, time) with 80% acc given cues as needed to increase safety with the completion of IADLs and ADLs. Budgeting task: 81% acc given mild verbal cues. One more session until mastered. Goal 2: To decrease cognitive deficits and improve attention to tasks for safe completion of ADLs, pt will complete structured tasks addressing (sustained, selective, alternating, divided) attention with 80% accuracy and min cues. Divided attn - 5-stack: 100% acc independently. Mastered. Goal 3: Pt will demonstrate auditory comprehension at the sentence/paragraph/conversation level with 80% acc with mild cues to promote pt's ability to promote safely preform ADLs within home environment.   Not targeted   Goal 4: Pt will complete abstract verbal reasoning tasks (similarities/differences, divergent naming) with 80% acc given min cues to promote effective communication of wants and needs. Not targeted. Goal 5: Pt will be educated on word finding strategies and will implement in structured tasks with 80% acc given cues as neded to promote effective communication of wants and needs. Not targeted. Two more sessions before goal is met. Goal 6: Pt will implement cognitive and executive functioning time management strategies at home and in structured tasks with speech therapist with mild verbal cues  Implemented strategy of writing information down, using calculator, and request for clarification independently this date. Pain Assessment:  Initial Assessment:  Patient does not c/o pain. Re-assessment:  Patient does not c/o pain. Plan:  Continue with current goals    Patient/Caregiver Education:  Patient/Caregiver educated on session. Patient/Caregiver provided with home program:  Patient/Caregiver stated verbal understanding of directions.     Time in: 1135  Time out: 1205  Minutes seen: 30  Co min      Signature: Electronically signed by AMAURI Blanton on 2/15/2023 at 12:06 PM

## 2023-02-22 ENCOUNTER — APPOINTMENT (OUTPATIENT)
Dept: SPEECH THERAPY | Age: 73
End: 2023-02-22
Payer: MEDICARE

## 2023-02-22 NOTE — PROGRESS NOTES
Therapy                            Cancellation/No-show Note      Date:  2023  Patient Name:  Damaso Antonio  :  1950   MRN:  [id number]      Visit Information:  Visit Information  SLP Insurance Information: [facility] Medicare  Total # of Visits Approved:  (Medical Necessity)  Total # of Visits to Date: 7  Timeframe Approved From[de-identified] 22  Timeframe Approved To[de-identified] 01/10/23  No Show: 0  Canceled Appointment: 1    For today's appointment patient:  Cancelled    Reason given by patient:  Other: Weather    Follow-up needed:  If >2 weeks, therapist to call pt for follow up    Comments:       Signature: Electronically signed by AMAURI Samayoa on 23 at 9:00 AM EST

## 2023-02-23 PROBLEM — M17.9 KNEE OSTEOARTHRITIS: Status: ACTIVE | Noted: 2022-02-02

## 2023-02-23 PROBLEM — M25.569 KNEE PAIN: Status: ACTIVE | Noted: 2023-02-23

## 2023-02-23 PROBLEM — M23.321: Status: ACTIVE | Noted: 2022-02-02

## 2023-02-23 PROBLEM — M47.812 DEGENERATIVE ARTHRITIS OF CERVICAL SPINE: Status: ACTIVE | Noted: 2023-02-23

## 2023-02-23 PROBLEM — H81.90 VESTIBULAR DYSFUNCTION: Status: ACTIVE | Noted: 2023-02-23

## 2023-02-23 PROBLEM — M54.2 NECK PAIN: Status: ACTIVE | Noted: 2023-02-23

## 2023-02-23 PROBLEM — S06.0XAA CONCUSSION: Status: ACTIVE | Noted: 2023-02-23

## 2023-02-23 PROBLEM — H83.2X9 VESTIBULAR DYSFUNCTION: Status: ACTIVE | Noted: 2023-02-23

## 2023-03-01 ENCOUNTER — HOSPITAL ENCOUNTER (OUTPATIENT)
Dept: SPEECH THERAPY | Age: 73
Setting detail: THERAPIES SERIES
Discharge: HOME | End: 2023-03-01
Payer: MEDICARE

## 2023-03-01 PROCEDURE — 97130 THER IVNTJ EA ADDL 15 MIN: CPT

## 2023-03-01 PROCEDURE — 97129 THER IVNTJ 1ST 15 MIN: CPT

## 2023-03-08 ENCOUNTER — HOSPITAL ENCOUNTER (OUTPATIENT)
Dept: SPEECH THERAPY | Age: 73
Setting detail: THERAPIES SERIES
End: 2023-03-08
Payer: MEDICARE

## 2023-03-08 PROCEDURE — 97129 THER IVNTJ 1ST 15 MIN: CPT

## 2023-03-08 PROCEDURE — 97130 THER IVNTJ EA ADDL 15 MIN: CPT

## 2023-03-08 NOTE — PROGRESS NOTES
[facility]  Speech Language Pathology  Adult Daily Note    Marsha Mcdermott  : 1950  [x]   confirmed      Date: 3/8/2023    Visit Information:  Visit Information  SLP Insurance Information: [facility] Medicare  Total # of Visits Approved:  (Medical Necessity)  Total # of Visits to Date: 9  No Show: 0  Canceled Appointment: 1    Plan of care signed (Y/N):     Yes    Certification Period: 2023-3/9/2023  Plan of Care Visit #9      Interventions used this date:  Cognitive Skill Development and Instruction in Compensatory Strategies    Subjective:   Pt arrived alone. She continues to report that she is being adjusted by a chiropractor and it seems to be helping with symptoms. Behavior:  Alert and Cooperative         Objective/Assessment:   Patient progressing towards goals:   Goal 1: Pt will complete executive functioning task (med mgmt, calculations, calendars, time) with 80% acc given cues as needed to increase safety with the completion of IADLs and ADLs. Time: 60% acc    Goal 2: To decrease cognitive deficits and improve attention to tasks for safe completion of ADLs, pt will complete structured tasks addressing (sustained, selective, alternating, divided) attention with 80% accuracy and min cues. Mastered. Goal 3: Pt will demonstrate auditory comprehension at the sentence/paragraph/conversation level with 80% acc with mild cues to promote pt's ability to promote safely preform ADLs within home environment. 73% acc independently with 4-sentence stimuli. Goal 4: Pt will complete abstract verbal reasoning tasks (similarities/differences, divergent naming) with 80% acc given min cues to promote effective communication of wants and needs. Not targeted,    Goal 5: Pt will be educated on word finding strategies and will implement in structured tasks with 80% acc given cues as neded to promote effective communication of wants and needs. Not targeted.     Goal 6: Pt will implement cognitive and executive functioning time management strategies at home and in structured tasks with speech therapist with mild verbal cues  Pt implemented \"thinking out loud\" approach this date given mild verbal cues. Pain Assessment:  Initial Assessment:  Patient does not c/o pain. Re-assessment:  Patient does not c/o pain. Plan:  Continue with current goals    Patient/Caregiver Education:  Patient/Caregiver educated on session. Patient/Caregiver provided with home program:  Patient/Caregiver stated verbal understanding of directions.     Time in: 1130  Time out: 1200  Minutes seen: 30  Co min      Signature: Electronically signed by Luis Villar SLP on 3/8/2023 at 12:59 PM

## 2023-03-08 NOTE — PROGRESS NOTES
[address] [facility], [address] [place] [address]  [address]              Phone: [phone]           Fax: [phone]                     [facility]  Speech Language Pathology  Adult Progress Note                          Physician: Heidi ONEILL-ANDREA      From: Alpa Torres, SLP, MA,CCC-SLP   Patient: Ismael Quijano       : 1950  Diagnosis: Cognitive Impairment     Date: 3/8/2023  Treatment Diagnosis: ICD 10 R41.89  Date of Evaluation: 22      Plan of Care/Treatment to date: Cognitive Skill Development and Instruction in Compensatory Strategies    Date range from 23 to 3/9/23. Subjective: Pt has been cooperative and pleasant for all sessions. She reports that she no longer needs to follow-up with a neuro surgeon, and has been seeing a chiropractor to manage symptoms. Progress toward Short-Term Goals:   Goal 1: Pt will complete executive functioning task (med mgmt, calculations, calendars, time) with 80% acc given cues as needed to increase safety with the completion of IADLs and ADLs. Progress made. Goal 2: To decrease cognitive deficits and improve attention to tasks for safe completion of ADLs, pt will complete structured tasks addressing (sustained, selective, alternating, divided) attention with 80% accuracy and min cues. Mastered. Goal 3: Pt will demonstrate auditory comprehension at the sentence/paragraph/conversation level with 80% acc with mild cues to promote pt's ability to promote safely preform ADLs within home environment. Progress made. Goal 4: Pt will complete abstract verbal reasoning tasks (similarities/differences, divergent naming) with 80% acc given min cues to promote effective communication of wants and needs. Progress made.      Goal 5: Pt will be educated on word finding strategies and will implement in structured tasks with 80% acc given cues as neded to promote effective communication of wants and needs. Progress made. Goal 6: Pt will implement cognitive and executive functioning time management strategies at home and in structured tasks with speech therapist with mild verbal cues  Progress made. Updated Short-Term Goals:   Goal 1: Pt will complete executive functioning task (med mgmt, calculations, calendars, time) with 80% acc given cues as needed to increase safety with the completion of IADLs and ADLs. Goal 2: Pt will demonstrate auditory comprehension at the sentence/paragraph/conversation level with 80% acc with mild cues to promote pt's ability to promote safely preform ADLs within home environment. Goal 3: Pt will complete abstract verbal reasoning tasks (similarities/differences, divergent naming) with 80% acc given min cues to promote effective communication of wants and needs. Goal 4: Pt will be educated on word finding strategies and will implement in structured tasks with 80% acc given cues as neded to promote effective communication of wants and needs. Goal 5: Pt will implement cognitive and executive functioning time management strategies at home and in structured tasks with speech therapist with mild verbal cues      Frequency/Duration of Treatment:   Days: 1 day/week  Length of Session:  30 minutes and 45 minutes  Weeks: 6 Weeks and 8 Weeks    Rehab Potential: Good    Prognostic Factors: Motivation  Participation level  Previous level of function       Goal Status: Did Not Achieve       Patient Status: Continue per initial Plan of Care    Discharge Plan: Re-assess continued need for skilled therapeutic services at the end of this plan of care. This patients condition is expected to improve within the treatment timeframe.     MODIFIED VIZCAINO FALL RISK ASSESSMENT:    History of Falling (has patient fallen in the past 30 days?):    No (0 points)    Secondary Diagnosis (is there more than 1 medical diagnosis in patients medical history?):    No (0 points)    Ambulatory Aid:    No device is used (0 points)    Gait:    Normal/bedrest/wheelchair (0 points)    Mental Status:    Oriented to own ability (0 points)      Total points = 0    Fall Risk Level: low    0 - 24: Low Risk - implement low risk fall prevention interventions    25 - 44: Medium risk  45 and higher: High Risk        Electronically signed by:  Electronically signed by AMAURI Martinez on 3/9/2023 at 8:48 AM        If you have any questions or concerns, please don't hesitate to call.   Thank you for your referral.      Physician Signature:________________________________Date:__________________  By signing above, therapists plan is approved by physician Stable

## 2023-03-15 ENCOUNTER — APPOINTMENT (OUTPATIENT)
Dept: SPEECH THERAPY | Age: 73
End: 2023-03-15
Payer: MEDICARE

## 2023-03-15 ENCOUNTER — PATIENT MESSAGE (OUTPATIENT)
Dept: NEUROLOGY | Age: 73
End: 2023-03-15

## 2023-03-15 PROCEDURE — 97130 THER IVNTJ EA ADDL 15 MIN: CPT

## 2023-03-15 PROCEDURE — 97129 THER IVNTJ 1ST 15 MIN: CPT

## 2023-03-15 NOTE — PROGRESS NOTES
[facility]  Speech Language Pathology  Adult Daily Note    Mallorie Pura  : 1950  [x]   confirmed      Date: 3/15/2023    Visit Information:  Visit Information  SLP Insurance Information: [facility] Medicare  Total # of Visits Approved:  (Medical Necessity)  Total # of Visits to Date: 10  No Show: 0  Canceled Appointment: 1    Plan of care signed (Y/N):     Yes    Certification Period: 2023-3/9/2023  Plan of Care Visit #9      Interventions used this date:  Cognitive Skill Development and Instruction in Compensatory Strategies    Subjective:   Pt arrived alone. She continues to report that she is being adjusted by a chiropractor and it seems to be helping with symptoms. Pt brought back homework completed with 80% acc. POC transferred to Doni Yoder - SLP effective this date. Behavior:  Alert and Cooperative         Objective/Assessment:   Patient progressing towards goals:  Goal 1: Pt will complete executive functioning task (med mgmt, calculations, calendars, time) with 80% acc given cues as needed to increase safety with the completion of IADLs and ADLs. Functional math: 60% acc. Pt required mild-mod cues in order to     Goal 2: Pt will demonstrate auditory comprehension at the sentence/paragraph/conversation level with 80% acc with mild cues to promote pt's ability to promote safely preform ADLs within home environment. Not targeted. Pt reports family stated they noticed an increase in her ability to have a conversation/word finding. Goal 3: Pt will complete abstract verbal reasoning tasks (similarities/differences, divergent naming) with 80% acc given min cues to promote effective communication of wants and needs. Multiple definitions: Max cues needed. Pt frequently listed items associated with the word, but struggled providing meanings.      Goal 4: Pt will be educated on word finding strategies and will implement in structured tasks with 80% acc given cues as neded to promote effective communication of wants and needs. Not targeted. Goal 5: Pt will implement cognitive and executive functioning time management strategies at home and in structured tasks with speech therapist with mild verbal cues  Not targeted. Pain Assessment:  Initial Assessment:  Patient does not c/o pain. Re-assessment:  Patient does not c/o pain. Plan:  Continue with current goals    Patient/Caregiver Education:  Patient/Caregiver educated on session. Patient/Caregiver provided with home program:  Patient/Caregiver stated verbal understanding of directions.     Time in: 1130  Time out: 1200  Minutes seen: 30  Co min      Signature: Electronically signed by AMAURI Rossi on 3/15/2023 at 11:58 AM

## 2023-03-16 ENCOUNTER — TELEPHONE (OUTPATIENT)
Dept: NEUROLOGY | Age: 73
End: 2023-03-16

## 2023-03-16 ENCOUNTER — PATIENT MESSAGE (OUTPATIENT)
Dept: FAMILY MEDICINE CLINIC | Age: 73
End: 2023-03-16

## 2023-03-16 NOTE — TELEPHONE ENCOUNTER
Jarvist message:    Good afternoon ! The  past weeks I am having extreme difficulty  sleeping at night  I have tried otc melatonin  which works for only about 2 3 hrs the next day I am really tired. Should I reach out to you or my primary. Please advise   Thank you    Please advise.

## 2023-03-19 RX ORDER — TRAZODONE HYDROCHLORIDE 50 MG/1
50 TABLET ORAL NIGHTLY PRN
Qty: 30 TABLET | Refills: 5 | Status: SHIPPED | OUTPATIENT
Start: 2023-03-19

## 2023-03-22 ENCOUNTER — APPOINTMENT (OUTPATIENT)
Dept: SPEECH THERAPY | Age: 73
End: 2023-03-22
Payer: MEDICARE

## 2023-03-22 PROCEDURE — 97129 THER IVNTJ 1ST 15 MIN: CPT

## 2023-03-22 PROCEDURE — 97130 THER IVNTJ EA ADDL 15 MIN: CPT

## 2023-03-22 NOTE — PROGRESS NOTES
reports that she implements the circumlocution word finding strategy as needed at the conversation level. Patient able to implement this strategy today independently in all opportunities    Goal 5: Pt will implement cognitive and executive functioning time management strategies at home and in structured tasks with speech therapist with mild verbal cues  Pt educated on working in chunks with scheduled break times in order to complete tasks efficiently. Pain Assessment:  Initial Assessment:  Patient c/o pain: Location and pain level: 2/10 neck and head pain  Pt able to proceed with session. Re-assessment:  Patient does not c/o pain. Patient c/o pain: Described as same as above    Plan:  Continue with current goals    Patient/Caregiver Education:  Patient/Caregiver educated on session. Patient/Caregiver provided with home program:  Patient/Caregiver stated verbal understanding of directions.     Time in: 1130  Time out: 1200  Minutes seen: 30      Signature: Electronically signed by AMAURI Apodaca on 3/22/2023 at 12:07 PM

## 2023-03-29 ENCOUNTER — APPOINTMENT (OUTPATIENT)
Dept: SPEECH THERAPY | Age: 73
End: 2023-03-29
Payer: MEDICARE

## 2023-03-29 PROCEDURE — 97130 THER IVNTJ EA ADDL 15 MIN: CPT

## 2023-03-29 PROCEDURE — 97129 THER IVNTJ 1ST 15 MIN: CPT

## 2023-03-29 NOTE — DISCHARGE SUMMARY
[address] [place], [address]                 Phone: [phone]                                  Fax:  [phone]              [facility]  Spe[facility] Pathology  Adult Discharge Note    Gabriella Allen  : 1950 (78 y.o.)   MRN: [id number]  Patient Diagnosis: Post concussion syndrome [F07.81]  Word finding difficulty [R47.89]  Referring Physician: MICHELLE Ingram Asa    Date of Evaluation: 2022          Treatment Diagnosis and ICD 10: R41.48 Cognitive Impairment      Today's Date: 3/29/2023  Treatment Dates:  From: 2022 To: 3/29/2023      TREATMENT ADMINISTERED:  Cognitive Skill Development and Instruction in Compensatory Strategies      PROGRESS TO DATE:    Patient has attended therapy on a consistent basis and has achieved the below listed goals. Patient feels that she has made all the progress that she will make given her brain injury. Patient reports success during daily life using her compensatory strategies. Patient encouraged to return if she notes a decline in function. Goal 1: Pt will complete executive functioning task (med mgmt, calculations, calendars, time) with 80% acc given cues as needed to increase safety with the completion of IADLs and ADLs. Goal 2: Pt will demonstrate auditory comprehension at the sentence/paragraph/conversation level with 80% acc with mild cues to promote pt's ability to promote safely preform ADLs within home environment. Goal 3: Pt will complete abstract verbal reasoning tasks (similarities/differences, divergent naming) with 80% acc given min cues to promote effective communication of wants and needs. Goal 4: Pt will be educated on word finding strategies and will implement in structured tasks with 80% acc given cues as neded to promote effective communication of wants and needs.   Goal 5: Pt will

## 2023-03-29 NOTE — PROGRESS NOTES
[facility]  Speech Language Pathology  Adult Daily Note    Gwen Sensor  : 1950  [x]   confirmed      Date: 3/29/2023    Visit Information:  Visit Information  SLP Insurance Information: [facility] Medicare  Total # of Visits Approved:  (Medical Necessity)  Total # of Visits to Date: 12  No Show: 0  Canceled Appointment: 1     Plan of care signed (Y/N):     Yes    Certification Period: 3/10/2023-2023  Plan of Care Visit #3*      Interventions used this date:  Cognitive Skill Development and Instruction in Compensatory Strategies    Subjective:   Pt reports overall having a \"good week\" this past week, though says sometimes her brain \"plays tricks\" on her. Patient has reached out to [facility] to participate in their programs. Pt in agreement to be discharged from [address] [place] [address] at this time. Behavior:  Alert and Cooperative         Objective/Assessment:   Patient progressing towards goals:  Goal 1: Pt will complete executive functioning task (med mgmt, calculations, calendars, time) with 80% acc given cues as needed to increase safety with the completion of IADLs and ADLs. Executive functioning money calculations: 90% accuracy with fading cues    Goal 2: Pt will demonstrate auditory comprehension at the sentence/paragraph/conversation level with 80% acc with mild cues to promote pt's ability to promote safely preform ADLs within home environment. Conversation level: 100% accuracy independently     Goal 3: Pt will complete abstract verbal reasoning tasks (similarities/differences, divergent naming) with 80% acc given min cues to promote effective communication of wants and needs. Logic questions: 80% accuracy with fading verbal cues     Goal 4: Pt will be educated on word finding strategies and will implement in structured tasks with 80% acc given cues as neded to promote effective communication of wants and needs.   Patient reports that she implements the

## 2023-04-26 ENCOUNTER — NURSE TRIAGE (OUTPATIENT)
Dept: OTHER | Facility: CLINIC | Age: 73
End: 2023-04-26

## 2023-05-02 ENCOUNTER — OFFICE VISIT (OUTPATIENT)
Dept: FAMILY MEDICINE CLINIC | Age: 73
End: 2023-05-02

## 2023-05-02 VITALS
TEMPERATURE: 97.1 F | DIASTOLIC BLOOD PRESSURE: 92 MMHG | WEIGHT: 181.6 LBS | HEIGHT: 63 IN | SYSTOLIC BLOOD PRESSURE: 146 MMHG | BODY MASS INDEX: 32.18 KG/M2 | HEART RATE: 97 BPM | OXYGEN SATURATION: 98 %

## 2023-05-02 DIAGNOSIS — E55.9 VITAMIN D DEFICIENCY: ICD-10-CM

## 2023-05-02 DIAGNOSIS — R53.83 FATIGUE, UNSPECIFIED TYPE: Primary | ICD-10-CM

## 2023-05-02 DIAGNOSIS — R53.83 FATIGUE, UNSPECIFIED TYPE: ICD-10-CM

## 2023-05-02 DIAGNOSIS — M79.604 PAIN IN BOTH LOWER EXTREMITIES: ICD-10-CM

## 2023-05-02 DIAGNOSIS — M79.605 PAIN IN BOTH LOWER EXTREMITIES: ICD-10-CM

## 2023-05-02 LAB
ALBUMIN SERPL-MCNC: 4.4 G/DL (ref 3.5–4.6)
ALP SERPL-CCNC: 100 U/L (ref 40–130)
ALT SERPL-CCNC: 22 U/L (ref 0–33)
ANION GAP SERPL CALCULATED.3IONS-SCNC: 13 MEQ/L (ref 9–15)
AST SERPL-CCNC: 24 U/L (ref 0–35)
BASOPHILS # BLD: 0.1 K/UL (ref 0–0.2)
BASOPHILS NFR BLD: 1.2 %
BILIRUB SERPL-MCNC: 0.4 MG/DL (ref 0.2–0.7)
BUN SERPL-MCNC: 14 MG/DL (ref 8–23)
CALCIUM SERPL-MCNC: 9.7 MG/DL (ref 8.5–9.9)
CHLORIDE SERPL-SCNC: 103 MEQ/L (ref 95–107)
CO2 SERPL-SCNC: 24 MEQ/L (ref 20–31)
CREAT SERPL-MCNC: 0.66 MG/DL (ref 0.5–0.9)
EOSINOPHIL # BLD: 0.1 K/UL (ref 0–0.7)
EOSINOPHIL NFR BLD: 1.6 %
ERYTHROCYTE [DISTWIDTH] IN BLOOD BY AUTOMATED COUNT: 13.1 % (ref 11.5–14.5)
GLOBULIN SER CALC-MCNC: 2.4 G/DL (ref 2.3–3.5)
GLUCOSE SERPL-MCNC: 142 MG/DL (ref 70–99)
HCT VFR BLD AUTO: 43.5 % (ref 37–47)
HGB BLD-MCNC: 14.6 G/DL (ref 12–16)
LYMPHOCYTES # BLD: 3 K/UL (ref 1–4.8)
LYMPHOCYTES NFR BLD: 41.4 %
MCH RBC QN AUTO: 30.7 PG (ref 27–31.3)
MCHC RBC AUTO-ENTMCNC: 33.6 % (ref 33–37)
MCV RBC AUTO: 91.3 FL (ref 79.4–94.8)
MONOCYTES # BLD: 0.7 K/UL (ref 0.2–0.8)
MONOCYTES NFR BLD: 9.7 %
NEUTROPHILS # BLD: 3.4 K/UL (ref 1.4–6.5)
NEUTS SEG NFR BLD: 46.1 %
PLATELET # BLD AUTO: 250 K/UL (ref 130–400)
POTASSIUM SERPL-SCNC: 3.9 MEQ/L (ref 3.4–4.9)
PROT SERPL-MCNC: 6.8 G/DL (ref 6.3–8)
RBC # BLD AUTO: 4.77 M/UL (ref 4.2–5.4)
SODIUM SERPL-SCNC: 140 MEQ/L (ref 135–144)
TSH REFLEX: 2.41 UIU/ML (ref 0.44–3.86)
WBC # BLD AUTO: 7.3 K/UL (ref 4.8–10.8)

## 2023-05-02 RX ORDER — LANOLIN ALCOHOL/MO/W.PET/CERES
400 CREAM (GRAM) TOPICAL DAILY
Qty: 30 TABLET | Refills: 2 | Status: SHIPPED | OUTPATIENT
Start: 2023-05-02

## 2023-05-02 RX ORDER — MELOXICAM 15 MG/1
TABLET ORAL
COMMUNITY
Start: 2023-02-20

## 2023-05-02 RX ORDER — CYANOCOBALAMIN 1000 UG/ML
1000 INJECTION, SOLUTION INTRAMUSCULAR; SUBCUTANEOUS ONCE
Status: COMPLETED | OUTPATIENT
Start: 2023-05-02 | End: 2023-05-02

## 2023-05-02 RX ADMIN — CYANOCOBALAMIN 1000 MCG: 1000 INJECTION, SOLUTION INTRAMUSCULAR; SUBCUTANEOUS at 15:47

## 2023-05-02 NOTE — PROGRESS NOTES
Vitamin D 25 Hydroxy; Future  - TSH with Reflex; Future  - Vitamin B12 & Folate; Future  - cyanocobalamin injection 1,000 mcg    2. Pain in both lower extremities    - magnesium oxide (MAG-OX) 400 (240 Mg) MG tablet; Take 1 tablet by mouth daily  Dispense: 30 tablet; Refill: 2    3. Vitamin D deficiency    - Vitamin D 25 Hydroxy; Future      Reviewed with the patient: current clinicalstatus, medications, activities and diet. Side effects, adverse effects of the medication prescribedtoday, as well as treatment plan/ rationale and result expectations have been discussedwith the patient who expresses understanding and desires to proceed. Close follow upto evaluate treatment results and for coordination of care. I have reviewedthe patient's medical history in detail and updated the computerized patient record.     Anthony NINO Witt - CNP

## 2023-05-03 LAB
FOLATE: 11.5 NG/ML
VITAMIN B-12: 307 PG/ML (ref 232–1245)
VITAMIN D 25-HYDROXY: 21.7 NG/ML

## 2023-05-03 ASSESSMENT — ENCOUNTER SYMPTOMS
COUGH: 0
SHORTNESS OF BREATH: 0
WHEEZING: 0
ABDOMINAL PAIN: 0

## 2023-05-05 DIAGNOSIS — R73.9 HYPERGLYCEMIA: ICD-10-CM

## 2023-05-05 DIAGNOSIS — E55.9 VITAMIN D DEFICIENCY: Primary | ICD-10-CM

## 2023-05-05 RX ORDER — CHOLECALCIFEROL (VITAMIN D3) 50 MCG
2000 TABLET ORAL DAILY
Qty: 30 TABLET | Refills: 5 | Status: SHIPPED | OUTPATIENT
Start: 2023-05-05

## 2023-05-08 DIAGNOSIS — R73.9 HYPERGLYCEMIA: ICD-10-CM

## 2023-05-08 LAB — HBA1C MFR BLD: 6 % (ref 4.8–5.9)

## 2023-05-16 ENCOUNTER — OFFICE VISIT (OUTPATIENT)
Dept: FAMILY MEDICINE CLINIC | Age: 73
End: 2023-05-16

## 2023-05-16 VITALS
HEIGHT: 63 IN | OXYGEN SATURATION: 99 % | SYSTOLIC BLOOD PRESSURE: 154 MMHG | BODY MASS INDEX: 32.04 KG/M2 | WEIGHT: 180.8 LBS | TEMPERATURE: 97.4 F | DIASTOLIC BLOOD PRESSURE: 88 MMHG | HEART RATE: 79 BPM

## 2023-05-16 DIAGNOSIS — E55.9 VITAMIN D DEFICIENCY: ICD-10-CM

## 2023-05-16 DIAGNOSIS — R03.0 ELEVATED BLOOD PRESSURE READING: ICD-10-CM

## 2023-05-16 DIAGNOSIS — R53.83 FATIGUE, UNSPECIFIED TYPE: Primary | ICD-10-CM

## 2023-05-16 DIAGNOSIS — R73.03 BORDERLINE DIABETES: ICD-10-CM

## 2023-05-16 ASSESSMENT — ENCOUNTER SYMPTOMS
ABDOMINAL PAIN: 0
BACK PAIN: 0
COLOR CHANGE: 0
COUGH: 0
WHEEZING: 0
SHORTNESS OF BREATH: 0

## 2023-05-16 NOTE — PROGRESS NOTES
Subjective:      Patient ID: Dolores Collins is a 68 y.o. female who presents today for:     Chief Complaint   Patient presents with    Blood Pressure Check     Patient presents today for blood pressure check. Fatigue     Patient states the fatigue is better. Spasms     Patient states the leg spasms are better. HPI Pt in today to f/u on fatigue and muscle spasms. She has been taking mag ox at night and started a vitamin d supplement. She reports she has had significant improvement in energy levels and no further leg pain or spasms. BP is still high. She denies any headaches, cp, palpitations, or sob. She reports she does have a BP machine at home to track BP. She would like to monitor and work on decreasing salt in diet for now. Past Medical History:   Diagnosis Date    Sinus infection      No past surgical history on file. No family history on file. Social History     Socioeconomic History    Marital status:      Spouse name: Not on file    Number of children: Not on file    Years of education: Not on file    Highest education level: Not on file   Occupational History    Not on file   Tobacco Use    Smoking status: Never    Smokeless tobacco: Never   Substance and Sexual Activity    Alcohol use: Yes    Drug use: No    Sexual activity: Never   Other Topics Concern    Not on file   Social History Narrative    Not on file     Social Determinants of Health     Financial Resource Strain: Low Risk     Difficulty of Paying Living Expenses: Not hard at all   Food Insecurity: No Food Insecurity    Worried About Running Out of Food in the Last Year: Never true    [address] in the Last Year: Never true   Transportation Needs: Unknown    Lack of Transportation (Medical): Not on file    Lack of Transportation (Non-Medical):  No   Physical Activity: Inactive    Days of Exercise per Week: 0 days    Minutes of Exercise per Session: 0 min   Stress: Not on file   Social Connections: Not on file

## 2023-06-26 ENCOUNTER — TELEPHONE (OUTPATIENT)
Dept: FAMILY MEDICINE CLINIC | Age: 73
End: 2023-06-26

## 2023-08-07 ENCOUNTER — OFFICE VISIT (OUTPATIENT)
Dept: FAMILY MEDICINE CLINIC | Age: 73
End: 2023-08-07
Payer: MEDICARE

## 2023-08-07 VITALS
WEIGHT: 180 LBS | DIASTOLIC BLOOD PRESSURE: 90 MMHG | HEIGHT: 63 IN | HEART RATE: 86 BPM | BODY MASS INDEX: 31.89 KG/M2 | SYSTOLIC BLOOD PRESSURE: 150 MMHG

## 2023-08-07 DIAGNOSIS — I10 PRIMARY HYPERTENSION: ICD-10-CM

## 2023-08-07 DIAGNOSIS — E53.8 B12 DEFICIENCY: ICD-10-CM

## 2023-08-07 DIAGNOSIS — E55.9 VITAMIN D DEFICIENCY: ICD-10-CM

## 2023-08-07 DIAGNOSIS — F41.1 GAD (GENERALIZED ANXIETY DISORDER): ICD-10-CM

## 2023-08-07 DIAGNOSIS — R03.0 ELEVATED BLOOD PRESSURE READING: ICD-10-CM

## 2023-08-07 DIAGNOSIS — F07.81 POST CONCUSSION SYNDROME: ICD-10-CM

## 2023-08-07 DIAGNOSIS — F33.0 MAJOR DEPRESSIVE DISORDER, RECURRENT, MILD (HCC): Primary | ICD-10-CM

## 2023-08-07 PROCEDURE — 99214 OFFICE O/P EST MOD 30 MIN: CPT | Performed by: NURSE PRACTITIONER

## 2023-08-07 PROCEDURE — G8399 PT W/DXA RESULTS DOCUMENT: HCPCS | Performed by: NURSE PRACTITIONER

## 2023-08-07 PROCEDURE — 1123F ACP DISCUSS/DSCN MKR DOCD: CPT | Performed by: NURSE PRACTITIONER

## 2023-08-07 PROCEDURE — 3077F SYST BP >= 140 MM HG: CPT | Performed by: NURSE PRACTITIONER

## 2023-08-07 PROCEDURE — 3080F DIAST BP >= 90 MM HG: CPT | Performed by: NURSE PRACTITIONER

## 2023-08-07 PROCEDURE — 96372 THER/PROPH/DIAG INJ SC/IM: CPT | Performed by: NURSE PRACTITIONER

## 2023-08-07 PROCEDURE — 1090F PRES/ABSN URINE INCON ASSESS: CPT | Performed by: NURSE PRACTITIONER

## 2023-08-07 PROCEDURE — G8427 DOCREV CUR MEDS BY ELIG CLIN: HCPCS | Performed by: NURSE PRACTITIONER

## 2023-08-07 PROCEDURE — G8417 CALC BMI ABV UP PARAM F/U: HCPCS | Performed by: NURSE PRACTITIONER

## 2023-08-07 PROCEDURE — 3017F COLORECTAL CA SCREEN DOC REV: CPT | Performed by: NURSE PRACTITIONER

## 2023-08-07 PROCEDURE — 1036F TOBACCO NON-USER: CPT | Performed by: NURSE PRACTITIONER

## 2023-08-07 RX ORDER — LOSARTAN POTASSIUM 25 MG/1
25 TABLET ORAL DAILY
Qty: 30 TABLET | Refills: 5 | Status: SHIPPED | OUTPATIENT
Start: 2023-08-07

## 2023-08-07 RX ORDER — PAROXETINE 10 MG/1
10 TABLET, FILM COATED ORAL DAILY
Qty: 30 TABLET | Refills: 5 | Status: SHIPPED | OUTPATIENT
Start: 2023-08-07

## 2023-08-07 RX ORDER — CYANOCOBALAMIN 1000 UG/ML
1000 INJECTION, SOLUTION INTRAMUSCULAR; SUBCUTANEOUS
Status: SHIPPED | OUTPATIENT
Start: 2023-08-07 | End: 2026-07-22

## 2023-08-07 RX ADMIN — CYANOCOBALAMIN 1000 MCG: 1000 INJECTION, SOLUTION INTRAMUSCULAR; SUBCUTANEOUS at 09:31

## 2023-08-07 ASSESSMENT — ENCOUNTER SYMPTOMS
DIARRHEA: 0
VOICE CHANGE: 0
CONSTIPATION: 0
GASTROINTESTINAL NEGATIVE: 1
VOMITING: 0
COLOR CHANGE: 0
ALLERGIC/IMMUNOLOGIC NEGATIVE: 1
SHORTNESS OF BREATH: 0
RECTAL PAIN: 0
BLOOD IN STOOL: 0
ANAL BLEEDING: 0
CHOKING: 0
NAUSEA: 0
ABDOMINAL PAIN: 0
BLURRED VISION: 1
HYPERVENTILATION: 0
TROUBLE SWALLOWING: 0
FEELING OF CHOKING: 0

## 2023-08-07 NOTE — PROGRESS NOTES
(NEURONTIN) 100 MG capsule LIST CLEANUP     Return in about 4 weeks (around 9/4/2023) for Blood Pressure. Reviewed with the patient: current clinical status, medications, activities and diet. Side effects, adverse effects of the medication prescribed today, as well as treatment plan/ rationale and result expectations have been discussed with the patient who expresses understanding and desires to proceed. Close follow up to evaluate treatment results and for coordination of care. I have reviewed the patient's medical history in detail and updated the computerized patient record.     Benita Rodney, NINO - CNP

## 2023-08-08 RX ORDER — LOSARTAN POTASSIUM 25 MG/1
25 TABLET ORAL DAILY
Qty: 90 TABLET | Refills: 1 | OUTPATIENT
Start: 2023-08-08

## 2023-09-08 ENCOUNTER — OFFICE VISIT (OUTPATIENT)
Dept: FAMILY MEDICINE CLINIC | Age: 73
End: 2023-09-08
Payer: MEDICARE

## 2023-09-08 VITALS
DIASTOLIC BLOOD PRESSURE: 62 MMHG | BODY MASS INDEX: 31.01 KG/M2 | WEIGHT: 175 LBS | HEIGHT: 63 IN | SYSTOLIC BLOOD PRESSURE: 130 MMHG | HEART RATE: 95 BPM

## 2023-09-08 DIAGNOSIS — R03.0 ELEVATED BLOOD PRESSURE READING: ICD-10-CM

## 2023-09-08 DIAGNOSIS — R03.0 FINDING OF ABOVE NORMAL BLOOD PRESSURE: Primary | ICD-10-CM

## 2023-09-08 DIAGNOSIS — E66.9 CLASS 1 OBESITY WITH SERIOUS COMORBIDITY AND BODY MASS INDEX (BMI) OF 31.0 TO 31.9 IN ADULT, UNSPECIFIED OBESITY TYPE: ICD-10-CM

## 2023-09-08 PROCEDURE — G8417 CALC BMI ABV UP PARAM F/U: HCPCS | Performed by: NURSE PRACTITIONER

## 2023-09-08 PROCEDURE — G8399 PT W/DXA RESULTS DOCUMENT: HCPCS | Performed by: NURSE PRACTITIONER

## 2023-09-08 PROCEDURE — 1036F TOBACCO NON-USER: CPT | Performed by: NURSE PRACTITIONER

## 2023-09-08 PROCEDURE — G8427 DOCREV CUR MEDS BY ELIG CLIN: HCPCS | Performed by: NURSE PRACTITIONER

## 2023-09-08 PROCEDURE — 1090F PRES/ABSN URINE INCON ASSESS: CPT | Performed by: NURSE PRACTITIONER

## 2023-09-08 PROCEDURE — 99214 OFFICE O/P EST MOD 30 MIN: CPT | Performed by: NURSE PRACTITIONER

## 2023-09-08 PROCEDURE — 1123F ACP DISCUSS/DSCN MKR DOCD: CPT | Performed by: NURSE PRACTITIONER

## 2023-09-08 PROCEDURE — 3017F COLORECTAL CA SCREEN DOC REV: CPT | Performed by: NURSE PRACTITIONER

## 2023-09-08 RX ORDER — PHENTERMINE HYDROCHLORIDE 37.5 MG/1
37.5 TABLET ORAL
Qty: 30 TABLET | Refills: 0 | Status: SHIPPED | OUTPATIENT
Start: 2023-09-08 | End: 2023-10-08

## 2023-09-14 ASSESSMENT — ENCOUNTER SYMPTOMS
CONSTIPATION: 0
GASTROINTESTINAL NEGATIVE: 1
ALLERGIC/IMMUNOLOGIC NEGATIVE: 1
TROUBLE SWALLOWING: 0
ABDOMINAL PAIN: 0
CHOKING: 0
FEELING OF CHOKING: 0
VOICE CHANGE: 0
HYPERVENTILATION: 0
RECTAL PAIN: 0
VOMITING: 0
BLOOD IN STOOL: 0
DIARRHEA: 0
BLURRED VISION: 1
SHORTNESS OF BREATH: 0
NAUSEA: 0
ANAL BLEEDING: 0
COLOR CHANGE: 0

## 2023-09-29 ENCOUNTER — TELEPHONE (OUTPATIENT)
Dept: FAMILY MEDICINE CLINIC | Age: 73
End: 2023-09-29

## 2023-10-06 ENCOUNTER — OFFICE VISIT (OUTPATIENT)
Dept: FAMILY MEDICINE CLINIC | Age: 73
End: 2023-10-06

## 2023-10-06 VITALS
HEART RATE: 80 BPM | WEIGHT: 174 LBS | HEIGHT: 63 IN | SYSTOLIC BLOOD PRESSURE: 138 MMHG | DIASTOLIC BLOOD PRESSURE: 86 MMHG | BODY MASS INDEX: 30.83 KG/M2

## 2023-10-06 DIAGNOSIS — I10 PRIMARY HYPERTENSION: ICD-10-CM

## 2023-10-06 DIAGNOSIS — E66.9 CLASS 1 OBESITY WITH SERIOUS COMORBIDITY AND BODY MASS INDEX (BMI) OF 31.0 TO 31.9 IN ADULT, UNSPECIFIED OBESITY TYPE: Primary | ICD-10-CM

## 2023-10-06 RX ORDER — PHENTERMINE HYDROCHLORIDE 37.5 MG/1
37.5 TABLET ORAL
Qty: 30 TABLET | Refills: 0 | Status: SHIPPED | OUTPATIENT
Start: 2023-10-06 | End: 2023-11-05

## 2023-10-06 ASSESSMENT — ENCOUNTER SYMPTOMS
ANAL BLEEDING: 0
ORTHOPNEA: 0
RECTAL PAIN: 0
BLOOD IN STOOL: 0
EYES NEGATIVE: 1
COLOR CHANGE: 0
VOICE CHANGE: 0
ABDOMINAL PAIN: 0
RESPIRATORY NEGATIVE: 1
BLURRED VISION: 0
SHORTNESS OF BREATH: 0
GASTROINTESTINAL NEGATIVE: 1
ALLERGIC/IMMUNOLOGIC NEGATIVE: 1
DIARRHEA: 0
CONSTIPATION: 0
TROUBLE SWALLOWING: 0

## 2023-10-12 PROBLEM — I10 ESSENTIAL (PRIMARY) HYPERTENSION: Status: ACTIVE | Noted: 2023-09-08

## 2023-10-23 ENCOUNTER — OFFICE VISIT (OUTPATIENT)
Dept: NEUROLOGY | Age: 73
End: 2023-10-23
Payer: MEDICARE

## 2023-10-23 VITALS
WEIGHT: 175.1 LBS | BODY MASS INDEX: 31.02 KG/M2 | HEART RATE: 77 BPM | DIASTOLIC BLOOD PRESSURE: 76 MMHG | SYSTOLIC BLOOD PRESSURE: 118 MMHG

## 2023-10-23 DIAGNOSIS — S09.90XD CLOSED HEAD INJURY, SUBSEQUENT ENCOUNTER: ICD-10-CM

## 2023-10-23 DIAGNOSIS — R27.0 ATAXIA: ICD-10-CM

## 2023-10-23 DIAGNOSIS — H83.2X3 VESTIBULAR DYSFUNCTION OF BOTH EARS: ICD-10-CM

## 2023-10-23 DIAGNOSIS — M79.2 NEURITIS: ICD-10-CM

## 2023-10-23 DIAGNOSIS — S06.0X0D CONCUSSION WITHOUT LOSS OF CONSCIOUSNESS, SUBSEQUENT ENCOUNTER: Primary | ICD-10-CM

## 2023-10-23 DIAGNOSIS — G44.311 INTRACTABLE ACUTE POST-TRAUMATIC HEADACHE: ICD-10-CM

## 2023-10-23 PROBLEM — S06.0XAA CONCUSSION: Status: ACTIVE | Noted: 2023-10-23

## 2023-10-23 PROBLEM — H81.90 VESTIBULAR DYSFUNCTION: Status: ACTIVE | Noted: 2023-10-23

## 2023-10-23 PROBLEM — M17.9 KNEE OSTEOARTHRITIS: Status: ACTIVE | Noted: 2023-10-23

## 2023-10-23 PROBLEM — M47.812 DEGENERATIVE ARTHRITIS OF CERVICAL SPINE: Status: ACTIVE | Noted: 2023-10-23

## 2023-10-23 PROBLEM — M23.91 INTERNAL DERANGEMENT OF RIGHT KNEE: Status: ACTIVE | Noted: 2023-10-23

## 2023-10-23 PROCEDURE — G8427 DOCREV CUR MEDS BY ELIG CLIN: HCPCS | Performed by: PSYCHIATRY & NEUROLOGY

## 2023-10-23 PROCEDURE — G8417 CALC BMI ABV UP PARAM F/U: HCPCS | Performed by: PSYCHIATRY & NEUROLOGY

## 2023-10-23 PROCEDURE — 1123F ACP DISCUSS/DSCN MKR DOCD: CPT | Performed by: PSYCHIATRY & NEUROLOGY

## 2023-10-23 PROCEDURE — G8399 PT W/DXA RESULTS DOCUMENT: HCPCS | Performed by: PSYCHIATRY & NEUROLOGY

## 2023-10-23 PROCEDURE — 3078F DIAST BP <80 MM HG: CPT | Performed by: PSYCHIATRY & NEUROLOGY

## 2023-10-23 PROCEDURE — 1090F PRES/ABSN URINE INCON ASSESS: CPT | Performed by: PSYCHIATRY & NEUROLOGY

## 2023-10-23 PROCEDURE — 3074F SYST BP LT 130 MM HG: CPT | Performed by: PSYCHIATRY & NEUROLOGY

## 2023-10-23 PROCEDURE — 3017F COLORECTAL CA SCREEN DOC REV: CPT | Performed by: PSYCHIATRY & NEUROLOGY

## 2023-10-23 PROCEDURE — 99214 OFFICE O/P EST MOD 30 MIN: CPT | Performed by: PSYCHIATRY & NEUROLOGY

## 2023-10-23 PROCEDURE — 1036F TOBACCO NON-USER: CPT | Performed by: PSYCHIATRY & NEUROLOGY

## 2023-10-23 PROCEDURE — G8484 FLU IMMUNIZE NO ADMIN: HCPCS | Performed by: PSYCHIATRY & NEUROLOGY

## 2023-10-23 RX ORDER — LIDOCAINE AND PRILOCAINE 25; 25 MG/G; MG/G
CREAM TOPICAL
Qty: 100 G | Refills: 3 | Status: SHIPPED | OUTPATIENT
Start: 2023-10-23

## 2023-10-23 RX ORDER — IBUPROFEN 800 MG/1
800 TABLET ORAL DAILY PRN
COMMUNITY
Start: 2021-03-26

## 2023-10-23 RX ORDER — DIAZEPAM 5 MG/1
1 TABLET ORAL NIGHTLY
COMMUNITY
Start: 2022-09-29 | End: 2023-10-25

## 2023-10-23 RX ORDER — IBUPROFEN 800 MG/1
TABLET ORAL
COMMUNITY
Start: 2023-10-16

## 2023-10-23 RX ORDER — METHYLPREDNISOLONE 4 MG/1
TABLET ORAL
COMMUNITY
Start: 2021-12-03 | End: 2023-10-25

## 2023-10-23 RX ORDER — PAROXETINE HYDROCHLORIDE 40 MG/1
TABLET, FILM COATED ORAL
COMMUNITY
End: 2023-10-25

## 2023-10-23 RX ORDER — MELOXICAM 15 MG/1
1 TABLET ORAL DAILY PRN
COMMUNITY
Start: 2020-05-14 | End: 2023-10-25

## 2023-10-23 ASSESSMENT — ENCOUNTER SYMPTOMS
CHOKING: 0
BACK PAIN: 0
VOMITING: 0
NAUSEA: 0
TROUBLE SWALLOWING: 0
COLOR CHANGE: 0
PHOTOPHOBIA: 0
SHORTNESS OF BREATH: 0

## 2023-10-23 NOTE — PROGRESS NOTES
Subjective:      Patient ID: Mario Bueno is a 68 y.o. female who presents today for:  Chief Complaint   Patient presents with    Follow-up     Pt states things are going good. No concerns or questions at this time        HPI 68-year-old right-handed female with a history of concussion with posttraumatic headaches and ataxia. Better without any other concerns and has not had any major vestibular or headache issues. Patient has not had any falls. Patient still has significant occasional burning pain in the back of the brain. This causes a headache. MRI of the brain therefore was done and this shows possibility of some soft tissue accumulation at C1-C2. No myelopathic signs are noted and there is no cough headaches to suggest Chiari formation. Past Medical History:   Diagnosis Date    Sinus infection      No past surgical history on file. Social History     Socioeconomic History    Marital status:      Spouse name: Not on file    Number of children: Not on file    Years of education: Not on file    Highest education level: Not on file   Occupational History    Not on file   Tobacco Use    Smoking status: Never    Smokeless tobacco: Never   Substance and Sexual Activity    Alcohol use: Yes    Drug use: No    Sexual activity: Never   Other Topics Concern    Not on file   Social History Narrative    Not on file     Social Determinants of Health     Financial Resource Strain: Low Risk  (2/6/2023)    Overall Financial Resource Strain (CARDIA)     Difficulty of Paying Living Expenses: Not hard at all   Food Insecurity: No Food Insecurity (2/6/2023)    Hunger Vital Sign     Worried About Running Out of Food in the Last Year: Never true     [address] in the Last Year: Never true   Transportation Needs: Unknown (2/6/2023)    PRAPARE - Transportation     Lack of Transportation (Medical): Not on file     Lack of Transportation (Non-Medical):  No   Physical Activity: Inactive (11/3/2022)    Exercise

## 2023-10-25 ENCOUNTER — OFFICE VISIT (OUTPATIENT)
Dept: CARDIOLOGY | Facility: CLINIC | Age: 73
End: 2023-10-25
Payer: MEDICARE

## 2023-10-25 VITALS
SYSTOLIC BLOOD PRESSURE: 134 MMHG | WEIGHT: 174 LBS | HEART RATE: 74 BPM | DIASTOLIC BLOOD PRESSURE: 82 MMHG | BODY MASS INDEX: 30.83 KG/M2 | HEIGHT: 63 IN

## 2023-10-25 DIAGNOSIS — R06.02 SHORTNESS OF BREATH: ICD-10-CM

## 2023-10-25 DIAGNOSIS — I20.0 UNSTABLE ANGINA PECTORIS (MULTI): ICD-10-CM

## 2023-10-25 DIAGNOSIS — Z78.9 NEVER SMOKED ANY SUBSTANCE: ICD-10-CM

## 2023-10-25 DIAGNOSIS — R93.1 AGATSTON CORONARY ARTERY CALCIUM SCORE LESS THAN 100: ICD-10-CM

## 2023-10-25 DIAGNOSIS — R00.2 PALPITATIONS: ICD-10-CM

## 2023-10-25 PROBLEM — R55 SYNCOPE AND COLLAPSE: Status: ACTIVE | Noted: 2023-10-25

## 2023-10-25 PROCEDURE — 1036F TOBACCO NON-USER: CPT | Performed by: INTERNAL MEDICINE

## 2023-10-25 PROCEDURE — 1159F MED LIST DOCD IN RCRD: CPT | Performed by: INTERNAL MEDICINE

## 2023-10-25 PROCEDURE — 99204 OFFICE O/P NEW MOD 45 MIN: CPT | Performed by: INTERNAL MEDICINE

## 2023-10-25 PROCEDURE — 1160F RVW MEDS BY RX/DR IN RCRD: CPT | Performed by: INTERNAL MEDICINE

## 2023-10-25 PROCEDURE — 93000 ELECTROCARDIOGRAM COMPLETE: CPT | Performed by: INTERNAL MEDICINE

## 2023-10-25 RX ORDER — LANOLIN ALCOHOL/MO/W.PET/CERES
1 CREAM (GRAM) TOPICAL DAILY
COMMUNITY
Start: 2023-05-02 | End: 2023-11-22

## 2023-10-25 RX ORDER — PAROXETINE 10 MG/1
10 TABLET, FILM COATED ORAL EVERY MORNING
COMMUNITY

## 2023-10-25 RX ORDER — LOSARTAN POTASSIUM 50 MG/1
50 TABLET ORAL DAILY
Qty: 30 TABLET | Refills: 11 | Status: SHIPPED | OUTPATIENT
Start: 2023-10-25 | End: 2023-11-22 | Stop reason: SDUPTHER

## 2023-10-25 RX ORDER — LOSARTAN POTASSIUM 25 MG/1
1 TABLET ORAL DAILY
COMMUNITY
Start: 2023-08-07 | End: 2023-11-22 | Stop reason: SDUPTHER

## 2023-10-25 NOTE — PROGRESS NOTES
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  She recently had a coronary calcium score the results of which were reviewed.  She has labile hypertension.  Sometimes blood pressures are running in the 170s systolic, today 134/84 with a heart rate of 74 she has noticed exertional shortness of breath with activity such as 1 flight of stairs, doing any type of outside work or walking.  The symptoms have been noticed in the past 3 months.  No orthopnea PND, has noticed elevation in heart rate and palpitations with activity.  Never smoker.  Has degenerative joint disease particularly of the right knee, which may limit her ability to do meaningful treadmill stress test.  Takes ibuprofen as needed    Brother with coronary artery disease in his 50s, underwent PCI and stenting mother maternal aunt and maternal uncle have all had coronary artery bypass grafting starting in their 60s.    Reviewed recent notes by primary care provider and recent laboratory data also reviewed  EKG today was reviewed.  There are nonspecific ST T abnormalities, inferior lateral leads and no prior EKG for comparison.    Objective      Wt Readings from Last 3 Encounters:   10/25/23 78.9 kg (174 lb)   06/10/22 75.3 kg (166 lb)   12/11/20 72.6 kg (160 lb)            Physical Exam:    GENERAL APPEARANCE: in no acute distress.  CHEST: Symmetric and non-tender.  INTEGUMENT: Skin warm and dry  HEENT: No gross abnormalities identified.No pallor or scleral icterus.  NECK: Supple, no JVD, no bruit.   NEURO/PSHCY: Alert and oriented x3; appropriate behavior and responses and responses  LUNGS: Clear to auscultation bilaterally; normal respiratory effort.  HEART: Rate and rhythm regular with no evident murmur; no gallop appreciated.   ABDOMEN:  Soft, non tender.  MUSCULOSKELETAL: DJD of the right knee  EXTREMITIES: Warm  There is no edema noted.    Meds:  Current Outpatient Medications   Medication Instructions    CHOLECALCIFEROL, VITAMIN D3, ORAL oral    ibuprofen 800 mg, oral, Daily PRN, PRN    losartan (Cozaar) 25 mg tablet 1 tablet, oral, Daily    losartan (COZAAR) 50 mg, oral, Daily    magnesium oxide (Mag-Ox) 400 mg (241.3 mg magnesium) tablet 1 tablet, oral, Daily, PRN    PARoxetine (PAXIL) 10 mg, oral, Every morning          Allergies   Allergen Reactions    Penicillins Unknown             LABS:    Lab Results   Component Value Date    HGBA1C 6.0 (H) 05/08/2023                 TSH 5/23 :2.41 comprehensive profile May 2023-sodium 140 potassium 3.9, glucose 142, creatinine 0.66, GFR greater than 60 lipid profile November 2022-total cholesterol 221 triglycerides 66, HDL 95,   Problem List:    Patient Active Problem List    Diagnosis Date Noted    Shortness of breath 10/25/2023    Palpitations 10/25/2023    Syncope and collapse 10/25/2023    Agatston coronary artery calcium score less than 100 10/25/2023    Concussion 10/23/2023    Degenerative arthritis of cervical spine 10/23/2023    Internal derangement of right knee 10/23/2023    Knee osteoarthritis 10/23/2023    Vestibular dysfunction 10/23/2023                 Assessment:     1.Shortness of breath  Relatively new symptoms starting about 3 months ago  Has taken phentermine for weight loss  Never a smoker   Not volume overloaded on examination  No orthopnea or PND or symptoms to suggest sleep apnea  Anginal equivalent should be considered given her age and risk factors  Abnormal EKG with inferior lateral ST-T abnormality  Prediabetic, hemoglobin A1c 6.0 May 2023  Additional work-up is prudent     Proceed with echocardiogram and Lexiscan Myoview, DJD of the right knee may limit treadmill stress test ability  Seek emergent medical attention if symptoms escalate    Palpitations:  Usually with  activity.  Sometimes at rest  Describes as heart racing, frequently comes on with activity.     We will do 24-hour Holter monitor  Magnesium oxide 400 mg daily, may increase as tolerated to 400 mg p.o. twice daily    History of syncope/collapse :  Tilt table test June 2013-orthostatic blood pressure response to tilt table testing, 10 minutes after head up tilt, symptomatic with lightheadedness blood pressure dropped from 135/88 to 117/95 heart rate went from 77 to 75 bpm, then patient received sublingual nitroglycerin, became dizzy mom felt like she was going to pass out blood pressure dropped to 81/58 heart rate went from 88 to 89 bpm     To me these findings suggest some dysautonomia and that heart rate did not increase appropriately with drop in blood pressure.  I would say she is prone to vasodepressor syncope     Agatston coronary artery calcium score less than 100  Medium          Overview  Written:Shanti Rdz MD10/25/2023 6:19 PM   September 2023-left main left circumflex and RCA 0 LAD 41, thoracic aorta 3.5 cm in diameter no pericardial effusion heavy mitral annular calcification normal pulmonary artery size, 53rd percentile for age gender and race in asymptomatic patients        Orders Placed This Encounter   Procedures    Holter or Event Cardiac Monitor    ECG 12 Lead    Transthoracic Echo (TTE) Complete       Follow up : after testing      Shanti Rdz MD

## 2023-11-02 DIAGNOSIS — E55.9 VITAMIN D DEFICIENCY: ICD-10-CM

## 2023-11-02 RX ORDER — CHOLECALCIFEROL (VITAMIN D3) 125 MCG
1 CAPSULE ORAL DAILY
Qty: 30 TABLET | Refills: 5 | Status: SHIPPED | OUTPATIENT
Start: 2023-11-02

## 2023-11-02 NOTE — TELEPHONE ENCOUNTER
Rx requested:  Requested Prescriptions     Pending Prescriptions Disp Refills    Cholecalciferol (VITAMIN D3) 50 MCG (2000 UT) TABS [Pharmacy Med Name: VITAMIN D3 50MCG TABLETS] 30 tablet 5     Sig: TAKE 1 TABLET BY MOUTH DAILY         Last Office Visit:   10/06/2023      Next Visit Date:  Future Appointments   Date Time Provider [address]   11/3/2023 11:30 AM [facility] ROOM 1 [facility]   11/7/2023  1:15 PM Jennifer Salazar, APRN - CNP [facility]   3/6/2024  3:45 PM MD [facility] -

## 2023-11-03 ENCOUNTER — HOSPITAL ENCOUNTER (OUTPATIENT)
Dept: MRI IMAGING | Age: 73
End: 2023-11-03
Payer: MEDICARE

## 2023-11-03 DIAGNOSIS — R27.0 ATAXIA: ICD-10-CM

## 2023-11-03 PROCEDURE — 72141 MRI NECK SPINE W/O DYE: CPT

## 2023-11-06 ENCOUNTER — TELEPHONE (OUTPATIENT)
Dept: NEUROLOGY | Age: 73
End: 2023-11-06

## 2023-11-06 DIAGNOSIS — M79.2 NEURITIS: Primary | ICD-10-CM

## 2023-11-06 NOTE — TELEPHONE ENCOUNTER
Pt states that she does not want surgery and she is wonder if you can referral to external pain management.

## 2023-11-07 ENCOUNTER — OFFICE VISIT (OUTPATIENT)
Dept: FAMILY MEDICINE CLINIC | Age: 73
End: 2023-11-07
Payer: MEDICARE

## 2023-11-07 VITALS
HEART RATE: 83 BPM | SYSTOLIC BLOOD PRESSURE: 134 MMHG | WEIGHT: 171 LBS | HEIGHT: 63 IN | BODY MASS INDEX: 30.3 KG/M2 | DIASTOLIC BLOOD PRESSURE: 78 MMHG

## 2023-11-07 VITALS — WEIGHT: 171 LBS | BODY MASS INDEX: 30.3 KG/M2 | HEIGHT: 63 IN

## 2023-11-07 DIAGNOSIS — M48.02 CERVICAL STENOSIS OF SPINAL CANAL: ICD-10-CM

## 2023-11-07 DIAGNOSIS — I10 PRIMARY HYPERTENSION: ICD-10-CM

## 2023-11-07 DIAGNOSIS — Z00.00 MEDICARE ANNUAL WELLNESS VISIT, SUBSEQUENT: Primary | ICD-10-CM

## 2023-11-07 DIAGNOSIS — E66.9 CLASS 1 OBESITY WITH SERIOUS COMORBIDITY AND BODY MASS INDEX (BMI) OF 30.0 TO 30.9 IN ADULT, UNSPECIFIED OBESITY TYPE: Primary | ICD-10-CM

## 2023-11-07 PROCEDURE — 3078F DIAST BP <80 MM HG: CPT | Performed by: NURSE PRACTITIONER

## 2023-11-07 PROCEDURE — 3017F COLORECTAL CA SCREEN DOC REV: CPT | Performed by: NURSE PRACTITIONER

## 2023-11-07 PROCEDURE — G8417 CALC BMI ABV UP PARAM F/U: HCPCS | Performed by: NURSE PRACTITIONER

## 2023-11-07 PROCEDURE — G0439 PPPS, SUBSEQ VISIT: HCPCS | Performed by: NURSE PRACTITIONER

## 2023-11-07 PROCEDURE — G8399 PT W/DXA RESULTS DOCUMENT: HCPCS | Performed by: NURSE PRACTITIONER

## 2023-11-07 PROCEDURE — G8427 DOCREV CUR MEDS BY ELIG CLIN: HCPCS | Performed by: NURSE PRACTITIONER

## 2023-11-07 PROCEDURE — 3075F SYST BP GE 130 - 139MM HG: CPT | Performed by: NURSE PRACTITIONER

## 2023-11-07 PROCEDURE — 1036F TOBACCO NON-USER: CPT | Performed by: NURSE PRACTITIONER

## 2023-11-07 PROCEDURE — G8484 FLU IMMUNIZE NO ADMIN: HCPCS | Performed by: NURSE PRACTITIONER

## 2023-11-07 PROCEDURE — 99214 OFFICE O/P EST MOD 30 MIN: CPT | Performed by: NURSE PRACTITIONER

## 2023-11-07 PROCEDURE — 1123F ACP DISCUSS/DSCN MKR DOCD: CPT | Performed by: NURSE PRACTITIONER

## 2023-11-07 PROCEDURE — 1090F PRES/ABSN URINE INCON ASSESS: CPT | Performed by: NURSE PRACTITIONER

## 2023-11-07 RX ORDER — LOSARTAN POTASSIUM 50 MG/1
50 TABLET ORAL DAILY
COMMUNITY

## 2023-11-07 RX ORDER — PHENTERMINE HYDROCHLORIDE 37.5 MG/1
37.5 TABLET ORAL
Qty: 30 TABLET | Refills: 0 | Status: SHIPPED | OUTPATIENT
Start: 2023-11-07 | End: 2023-12-07

## 2023-11-07 ASSESSMENT — ENCOUNTER SYMPTOMS
ANAL BLEEDING: 0
ABDOMINAL PAIN: 0
RESPIRATORY NEGATIVE: 1
GASTROINTESTINAL NEGATIVE: 1
EYES NEGATIVE: 1
ALLERGIC/IMMUNOLOGIC NEGATIVE: 1
BLURRED VISION: 0
RECTAL PAIN: 0
DIARRHEA: 0
ORTHOPNEA: 0
COLOR CHANGE: 0
SHORTNESS OF BREATH: 0
BLOOD IN STOOL: 0
CONSTIPATION: 0
TROUBLE SWALLOWING: 0
VOICE CHANGE: 0

## 2023-11-07 ASSESSMENT — LIFESTYLE VARIABLES
HOW OFTEN DO YOU HAVE A DRINK CONTAINING ALCOHOL: NEVER
HOW MANY STANDARD DRINKS CONTAINING ALCOHOL DO YOU HAVE ON A TYPICAL DAY: 1 OR 2

## 2023-11-07 ASSESSMENT — PATIENT HEALTH QUESTIONNAIRE - PHQ9
8. MOVING OR SPEAKING SO SLOWLY THAT OTHER PEOPLE COULD HAVE NOTICED. OR THE OPPOSITE, BEING SO FIGETY OR RESTLESS THAT YOU HAVE BEEN MOVING AROUND A LOT MORE THAN USUAL: 0
SUM OF ALL RESPONSES TO PHQ QUESTIONS 1-9: 0
SUM OF ALL RESPONSES TO PHQ QUESTIONS 1-9: 0
2. FEELING DOWN, DEPRESSED OR HOPELESS: 0
4. FEELING TIRED OR HAVING LITTLE ENERGY: 0
10. IF YOU CHECKED OFF ANY PROBLEMS, HOW DIFFICULT HAVE THESE PROBLEMS MADE IT FOR YOU TO DO YOUR WORK, TAKE CARE OF THINGS AT HOME, OR GET ALONG WITH OTHER PEOPLE: 0
9. THOUGHTS THAT YOU WOULD BE BETTER OFF DEAD, OR OF HURTING YOURSELF: 0
1. LITTLE INTEREST OR PLEASURE IN DOING THINGS: 0
SUM OF ALL RESPONSES TO PHQ9 QUESTIONS 1 & 2: 0
3. TROUBLE FALLING OR STAYING ASLEEP: 0
5. POOR APPETITE OR OVEREATING: 0
7. TROUBLE CONCENTRATING ON THINGS, SUCH AS READING THE NEWSPAPER OR WATCHING TELEVISION: 0
6. FEELING BAD ABOUT YOURSELF - OR THAT YOU ARE A FAILURE OR HAVE LET YOURSELF OR YOUR FAMILY DOWN: 0
SUM OF ALL RESPONSES TO PHQ QUESTIONS 1-9: 0
SUM OF ALL RESPONSES TO PHQ QUESTIONS 1-9: 0

## 2023-11-07 ASSESSMENT — COLUMBIA-SUICIDE SEVERITY RATING SCALE - C-SSRS
3. HAVE YOU BEEN THINKING ABOUT HOW YOU MIGHT KILL YOURSELF?: NO
7. DID THIS OCCUR IN THE LAST THREE MONTHS: NO
4. HAVE YOU HAD THESE THOUGHTS AND HAD SOME INTENTION OF ACTING ON THEM?: NO
5. HAVE YOU STARTED TO WORK OUT OR WORKED OUT THE DETAILS OF HOW TO KILL YOURSELF? DO YOU INTEND TO CARRY OUT THIS PLAN?: NO

## 2023-11-07 NOTE — PATIENT INSTRUCTIONS
[phone] or search [employer] on [address] [place] [address]. You need 9298-7349 mg of calcium and 7690-2546 IU of vitamin D per day. It is possible to meet your calcium requirement with diet alone, but a vitamin D supplement is usually necessary to meet this goal.  When exposed to the sun, use a sunscreen that protects against both UVA and UVB radiation with an SPF of 30 or greater. Reapply every 2 to 3 hours or after sweating, drying off with a towel, or swimming. Always wear a seat belt when traveling in a car. Always wear a helmet when riding a bicycle or motorcycle.

## 2023-11-07 NOTE — PROGRESS NOTES
breakfast) for 30 days. BMI 30.3 Max Daily Amount: 37.5 mg    Cervical stenosis of spinal canal    Primary hypertension      No orders of the defined types were placed in this encounter. Orders Placed This Encounter   Medications    phentermine (ADIPEX-P) 37.5 MG tablet     Sig: Take 1 tablet by mouth every morning (before breakfast) for 30 days. BMI 30.3 Max Daily Amount: 37.5 mg     Dispense:  30 tablet     Refill:  0     Medications Discontinued During This Encounter   Medication Reason    losartan (COZAAR) 25 MG tablet LIST CLEANUP     No follow-ups on file. Reviewed with the patient: current clinical status, medications, activities and diet. Side effects, adverse effects of the medication prescribed today, as well as treatment plan/ rationale and result expectations have been discussed with the patient who expresses understanding and desires to proceed. Close follow up to evaluate treatment results and for coordination of care. I have reviewed the patient's medical history in detail and updated the computerized patient record.     Jose Carlos Ferrera, NINO - CNP

## 2023-11-09 ENCOUNTER — OFFICE VISIT (OUTPATIENT)
Dept: ORTHOPEDIC SURGERY | Facility: CLINIC | Age: 73
End: 2023-11-09
Payer: MEDICARE

## 2023-11-09 ENCOUNTER — DOCUMENTATION (OUTPATIENT)
Dept: ORTHOPEDIC SURGERY | Facility: CLINIC | Age: 73
End: 2023-11-09

## 2023-11-09 DIAGNOSIS — M99.51 INTERVERTEBRAL DISC STENOSIS OF NEURAL CANAL OF CERVICAL REGION: Primary | ICD-10-CM

## 2023-11-09 DIAGNOSIS — M22.2X1 PATELLOFEMORAL PAIN SYNDROME OF BOTH KNEES: ICD-10-CM

## 2023-11-09 DIAGNOSIS — M22.2X2 PATELLOFEMORAL PAIN SYNDROME OF BOTH KNEES: ICD-10-CM

## 2023-11-09 NOTE — PROGRESS NOTES
Established patient to the practice new to us comes to the office complaining of pain at the base of her skull where she had a fall and she had some staples put into her scalp.  She denies radiculopathy to the arms or paresthesia this pain is not constant.  She denies any recent trauma accidents or falls to cause it.  No spine surgeries in the past she also has an appointment with her neurosurgeon at [facility].    Past medical history review of systems reviewed and unchanged    Physical exam: Well-nourished, well kept.  No lymphangitis or lymphadenopathy in the examined extremities.  Good perfusion to the extremities ×4.  Radial and dorsalis pedis pulses 2+.  Capillary refill to all 4 digits brisk.  No distal edema x 4.  Gait normal.  Can walk on heels and toes.  There is decreased range of motion flexion extension rotation cervical spine good strength no instability tender.  Examination of the upper extremities reveals no point tenderness, swelling, or deformity.  Range of motion of the shoulders, elbows, wrists, and fingers are all full without crepitance, instability, or exacerbation of pain.  Strength is 5/5 throughout.  Examination of the lower extremities reveals no point tenderness, swelling, or deformity.  Range of motion of the hips, knees, and ankles are full without crepitance, instability, or exacerbation of pain.  Strength is 5/5 throughout.  No redness, abrasions, or lesions on all 4 extremities, head and neck, or trunk.  Gross sensation intact in the extremities ×4.  Deep tendon reflexes 2+ and symmetric bilaterally.  Bello, clonus, and Babinski were negative.  Straight leg raise negative.  Affect normal.  Alert and oriented ×3.  Coordination normal.    X-ray: X-rays and MRI reviewed from [facility] and report reviewed as well showing arthritic degenerative changes multiple levels no fractures moderate stenosis cervical to the cervical 7.    Assessment: This is a patient with base of the skull pain where  she had a prior injury laceration was staples placed and she is getting some nerve type issues every now and then.  It is not constant.  She is not having any myelopathy type symptoms.  No saddle anesthesia gait or balance changes.  She does not want to have any spine surgery.    Plan: Get a get the patient into some physical therapy with modalities cupping dry needling etc.  She will follow-up with the neurosurgeon to see if he has any other recommendations and she will be as needed to us

## 2023-11-09 NOTE — PROGRESS NOTES
Patient was also complaining of some bilateral knee pain for diagnosed with patellofemoral syndrome.  Patient was recommended some knee braces which she was fitted for today in the office.

## 2023-11-13 ENCOUNTER — ANCILLARY PROCEDURE (OUTPATIENT)
Dept: RADIOLOGY | Facility: CLINIC | Age: 73
End: 2023-11-13
Payer: MEDICARE

## 2023-11-13 ENCOUNTER — ANCILLARY PROCEDURE (OUTPATIENT)
Dept: CARDIOLOGY | Facility: CLINIC | Age: 73
End: 2023-11-13
Payer: MEDICARE

## 2023-11-13 DIAGNOSIS — R06.02 SHORTNESS OF BREATH: ICD-10-CM

## 2023-11-13 DIAGNOSIS — R00.2 PALPITATIONS: ICD-10-CM

## 2023-11-13 DIAGNOSIS — R93.1 AGATSTON CORONARY ARTERY CALCIUM SCORE LESS THAN 100: ICD-10-CM

## 2023-11-13 DIAGNOSIS — I20.0 UNSTABLE ANGINA PECTORIS (MULTI): ICD-10-CM

## 2023-11-13 DIAGNOSIS — Z78.9 NEVER SMOKED ANY SUBSTANCE: ICD-10-CM

## 2023-11-13 LAB
AORTIC VALVE MEAN GRADIENT: 15
AORTIC VALVE PEAK VELOCITY: 2.48
AV PEAK GRADIENT: 24.6
AVA (PEAK VEL): 1.47
AVA (VTI): 1.35
EJECTION FRACTION APICAL 4 CHAMBER: 67.2
EJECTION FRACTION: 68
LEFT VENTRICLE INTERNAL DIMENSION DIASTOLE: 4.43 (ref 3.5–6)
LEFT VENTRICULAR OUTFLOW TRACT DIAMETER: 1.8
MITRAL VALVE E/A RATIO: 1.04
MITRAL VALVE E/E' RATIO: 18.4
RIGHT VENTRICLE PEAK SYSTOLIC PRESSURE: 40.2

## 2023-11-13 PROCEDURE — 2500000004 HC RX 250 GENERAL PHARMACY W/ HCPCS (ALT 636 FOR OP/ED): Performed by: INTERNAL MEDICINE

## 2023-11-13 PROCEDURE — 78452 HT MUSCLE IMAGE SPECT MULT: CPT | Performed by: INTERNAL MEDICINE

## 2023-11-13 PROCEDURE — 93306 TTE W/DOPPLER COMPLETE: CPT | Performed by: INTERNAL MEDICINE

## 2023-11-13 PROCEDURE — 93306 TTE W/DOPPLER COMPLETE: CPT

## 2023-11-13 PROCEDURE — 93016 CV STRESS TEST SUPVJ ONLY: CPT | Performed by: INTERNAL MEDICINE

## 2023-11-13 PROCEDURE — 78452 HT MUSCLE IMAGE SPECT MULT: CPT

## 2023-11-13 PROCEDURE — 93225 XTRNL ECG REC<48 HRS REC: CPT | Performed by: INTERNAL MEDICINE

## 2023-11-13 PROCEDURE — 93018 CV STRESS TEST I&R ONLY: CPT | Performed by: INTERNAL MEDICINE

## 2023-11-13 PROCEDURE — 93227 XTRNL ECG REC<48 HR R&I: CPT | Performed by: INTERNAL MEDICINE

## 2023-11-13 PROCEDURE — A9502 TC99M TETROFOSMIN: HCPCS | Performed by: INTERNAL MEDICINE

## 2023-11-13 PROCEDURE — 3430000001 HC RX 343 DIAGNOSTIC RADIOPHARMACEUTICALS: Performed by: INTERNAL MEDICINE

## 2023-11-13 RX ORDER — REGADENOSON 0.08 MG/ML
0.4 INJECTION, SOLUTION INTRAVENOUS ONCE
Status: COMPLETED | OUTPATIENT
Start: 2023-11-13 | End: 2023-11-13

## 2023-11-13 RX ADMIN — REGADENOSON 0.4 MG: 0.08 INJECTION, SOLUTION INTRAVENOUS at 09:01

## 2023-11-13 RX ADMIN — TETROFOSMIN 11.3 MILLICURIE: 0.23 INJECTION, POWDER, LYOPHILIZED, FOR SOLUTION INTRAVENOUS at 07:48

## 2023-11-13 RX ADMIN — TETROFOSMIN 35.3 MILLICURIE: 0.23 INJECTION, POWDER, LYOPHILIZED, FOR SOLUTION INTRAVENOUS at 08:49

## 2023-11-15 ENCOUNTER — INITIAL CONSULT (OUTPATIENT)
Dept: NEUROSURGERY | Age: 73
End: 2023-11-15
Payer: MEDICARE

## 2023-11-15 VITALS
BODY MASS INDEX: 30.3 KG/M2 | DIASTOLIC BLOOD PRESSURE: 74 MMHG | HEIGHT: 63 IN | WEIGHT: 171 LBS | SYSTOLIC BLOOD PRESSURE: 122 MMHG | TEMPERATURE: 97.6 F

## 2023-11-15 DIAGNOSIS — M48.02 CERVICAL STENOSIS OF SPINE: Primary | ICD-10-CM

## 2023-11-15 PROCEDURE — G8417 CALC BMI ABV UP PARAM F/U: HCPCS | Performed by: NEUROLOGICAL SURGERY

## 2023-11-15 PROCEDURE — 99204 OFFICE O/P NEW MOD 45 MIN: CPT | Performed by: NEUROLOGICAL SURGERY

## 2023-11-15 PROCEDURE — G8427 DOCREV CUR MEDS BY ELIG CLIN: HCPCS | Performed by: NEUROLOGICAL SURGERY

## 2023-11-15 PROCEDURE — 3074F SYST BP LT 130 MM HG: CPT | Performed by: NEUROLOGICAL SURGERY

## 2023-11-15 PROCEDURE — 1090F PRES/ABSN URINE INCON ASSESS: CPT | Performed by: NEUROLOGICAL SURGERY

## 2023-11-15 PROCEDURE — G8484 FLU IMMUNIZE NO ADMIN: HCPCS | Performed by: NEUROLOGICAL SURGERY

## 2023-11-15 PROCEDURE — 3078F DIAST BP <80 MM HG: CPT | Performed by: NEUROLOGICAL SURGERY

## 2023-11-15 ASSESSMENT — ENCOUNTER SYMPTOMS
ABDOMINAL PAIN: 0
BACK PAIN: 0
TROUBLE SWALLOWING: 0
ABDOMINAL DISTENTION: 0
SHORTNESS OF BREATH: 0
EYE PAIN: 0
COUGH: 0

## 2023-11-15 NOTE — PROGRESS NOTES
Patient Name: Mario Bueno : 1950        Date: 11/15/2023      Type of Appt: Consult    Reason for appt: MRI AT [facility],  [place] FROM A FALL.     Referred by: DR. José Wilson MD    Studies done: 11/3/23 MRI CERVICAL SPINE AT [facility]  10/6/23 XR OF CERVICAL SPINE AT     Smoking: Yes or No    REVIEW OF SYSTEMS:    Rash   Difficulty Urinating Nausea     Fever   Headaches  Bruising/Bleeding Easily     Hearing loss  Constipation  Sleep Disturbance    Shortness of breath Diarrhea  Neck Pain  Back Pain
Tendon Reflexes:      Reflex Scores:       Bicep reflexes are 2+ on the right side and 2+ on the left side. Patellar reflexes are 2+ on the right side and 2+ on the left side. Achilles reflexes are 2+ on the right side and 2+ on the left side. Comments: Motor 5/5 UE and LE  Sensation intact LT  DTR's 2+ biceps, achilles and patella  Negative Caballero's   Psychiatric:         Mood and Affect: Mood normal.         Speech: Speech normal.         Thought Content: Thought content normal.              Gait  normal            Radiology Personal review:    Review of MRI of the cervical spine from November 3, 2023 reveals ventral epidural appearing lesion at C1-2 which is mostly hyperintense, small area of hypointensity, there is mild to moderate central stenosis secondary to this, there is degenerative changes with moderate central and moderate to severe bilateral foraminal stenosis C3-4 and C6-7, there is mild central and moderate to severe bilateral foraminal stenosis C4-5 and C5-6. ASSESSMENT / PLAN :       68year-old with complaints of some walking difficulties and right leg giving out which appears to be mostly secondary to right knee pathology. She is denying any neck pain or arm pain numbness or weakness or difficulty with hand function. Clinically she does not have any significant evidence of cervical myelopathy. MRI of the cervical spine reveals ventral lesion at C1-2 which appears cystic, could be secondary to degenerative disease or synovial cyst, malignancy cannot be completely ruled out. There is also multilevel stenosis from C3-C7. I discussed options with the patient including further conservative therapy and surgery. She could possibly have early myelopathy which could be affecting her walking.   As the worst problem appears to be from C3-C7, cervical decompression of stenosis could be considered and I have discussed the risks, benefits and alternatives of C3-C7 posterior cervical

## 2023-11-21 NOTE — PROGRESS NOTES
Patient was initially seen by me on 10/25/2023, and presents for follow-up after additional testing.    Subjective :     Continues to have palpitations, 1 episode woke her up from sleep and she says it was pretty bad.  Has some exertional shortness of breath, no orthopnea or PND  She has reviewed the results of all her testing on [facility].  Does not want to pursue additional workup for pulmonary hypertension.  Does not want pulmonary function testing or pulmonology referral for sleep study      Objective      Wt Readings from Last 3 Encounters:   10/25/23 78.9 kg (174 lb)   06/10/22 75.3 kg (166 lb)   12/11/20 72.6 kg (160 lb)            Physical Exam:    GENERAL APPEARANCE: in no acute distress.  CHEST: Symmetric and non-tender.  INTEGUMENT: Skin warm and dry  HEENT: No gross abnormalities identified.No pallor or scleral icterus.  NECK: Supple, no JVD, no bruit.   NEURO/PSHCY: Alert and oriented x3; appropriate behavior and responses and responses  LUNGS: Clear to auscultation bilaterally; normal respiratory effort.  HEART: Rate and rhythm regular with grade 1/6 crescendo decrescendo murmur along the left sternal border no gallop appreciated.   ABDOMEN: Soft, non tender.  MUSCULOSKELETAL: No gross deformities.  EXTREMITIES: Warm  There is no edema noted.    Meds:  Current Outpatient Medications   Medication Instructions    CHOLECALCIFEROL, VITAMIN D3, ORAL oral    ibuprofen 800 mg, oral, Daily PRN, PRN    losartan (Cozaar) 25 mg tablet 1 tablet, oral, Daily    losartan (COZAAR) 50 mg, oral, Daily    magnesium oxide (Mag-Ox) 400 mg (241.3 mg magnesium) tablet 1 tablet, oral, Daily, PRN    PARoxetine (PAXIL) 10 mg, oral, Every morning          Allergies   Allergen Reactions    Penicillins Unknown             LABS:    Lab Results   Component Value Date    HGBA1C 6.0 (H) 05/08/2023                   Problem List:    Patient Active Problem List    Diagnosis Date Noted    Shortness of breath 10/25/2023    Palpitations  10/25/2023    Syncope and collapse 10/25/2023    Agatston coronary artery calcium score less than 100 10/25/2023    Concussion 10/23/2023    Degenerative arthritis of cervical spine 10/23/2023    Internal derangement of right knee 10/23/2023    Knee osteoarthritis 10/23/2023    Vestibular dysfunction 10/23/2023                    Assessment:      1.Shortness of breath  Relatively new symptoms starting about 3 months ago  Has taken phentermine for weight loss  Never a smoker   Not volume overloaded on examination  No orthopnea or PND or symptoms to suggest sleep apnea  Anginal equivalent should be considered given her age and risk factors and Lexiscan Myoview was performed, results reviewed, normal perfusion, normal transient ischemic dilatation  Abnormal EKG with inferior lateral ST-T abnormality  Prediabetic, hemoglobin A1c 6.0 May 2023  Additional work-up is prudent        Palpitations:  Usually with activity.  Sometimes at rest  Describes as heart racing, frequently comes on with activity.  Holter results reviewed-has PACs PVCs and short bursts of paroxysmal atrial tachycardia    Talked about antiarrhythmic, versus beta-blocker, and decided on metoprolol succinate 25 mg p.o. daily   Patient should take 400 mg p.o. twice daily, she stopped taking magnesium, she was using it for leg cramps, I told her to resume, and increase dose to 400 mg twice a day.  We talked about starting flecainide low-dose, but we will see how she does with the beta-blockade.  If shortness of breath worsens, with metoprolol, possibilities include reactive airway disease or chronotropic blunting.     History of syncope/collapse :  Tilt table test June 2013-orthostatic blood pressure response to tilt table testing, 10 minutes after head up tilt, symptomatic with lightheadedness blood pressure dropped from 135/88 to 117/95 heart rate went from 77 to 75 bpm, then patient received sublingual nitroglycerin, became dizzy mom felt like she was  going to pass out blood pressure dropped to 81/58 heart rate went from 88 to 89 bpm    To me these findings suggest some dysautonomia and that heart rate did not increase appropriately with drop in blood pressure.  I would say she is prone to vasodepressor syncope  She should stay well-hydrated, and avoid precipitating scenarios    [facility] Myoview November 2023-normal LVEF 84%, transient ischemic dilatation 0.79.  No evidence of attenuation artifact.  This along with her coronary calcium score makes it very unlikely that she has any flow-limiting coronary artery disease    Echocardiogram November 2023: LVEF 65% indeterminate diastolic function normal RV size and systolic function pulmonary hypertension mild tricuspid regurgitation RVSP 42 mmHg mitral valve leaflet thickening 1+ mitral regurgitation mild to moderate aortic stenosis peak and mean gradients 25 and 15 respectively calculated valve area 1.4 cm², which probably overestimates the severity slightly no aortic regurgitation LV end-systolic diameter 2.39 cm aortic root 2.5 cm left atrium 3 cm, aortic valve dimensionless index 0.53    24-hour Holter: Sinus rhythm average rate 79 bpm palpitations corresponded to normal sinus rhythm supraventricular premature beats over 10/h 3 episodes of SVT atrial tachycardia 1 episode 2 seconds long no heart block        Agatston coronary artery calcium score less than 100   Medium               Overview      Written:Shanti Rdz MD10/25/2023 6:19 PM   September 2023-left main left circumflex and RCA 0 LAD 41, thoracic aorta 3.5 cm in diameter no pericardial effusion heavy mitral annular calcification normal pulmonary artery size, 53rd percentile for age gender and race in asymptomatic patients          She has severe DJD of the right knee, and hence she was unable to do a treadmill stress test  She also has severe DJD of the cervical spine, and has been seeing neurosurgery, about to start physical  therapy.    Recommendations:  1.  Magnesium oxide 400 mg p.o. twice daily  2.  Metoprolol succinate 25 mg p.o. daily  3.  Follow-up sooner if symptoms escalate      Follow up : 6 months      Shanti Rdz MD           Scribe Attestation  By signing my name below, Liz LR LPN , Scribe   attest that this documentation has been prepared under the direction and in the presence of Shanti Rdz MD.

## 2023-11-22 ENCOUNTER — OFFICE VISIT (OUTPATIENT)
Dept: CARDIOLOGY | Facility: CLINIC | Age: 73
End: 2023-11-22
Payer: MEDICARE

## 2023-11-22 VITALS
HEART RATE: 74 BPM | HEIGHT: 63 IN | BODY MASS INDEX: 30.12 KG/M2 | WEIGHT: 170 LBS | SYSTOLIC BLOOD PRESSURE: 136 MMHG | DIASTOLIC BLOOD PRESSURE: 72 MMHG

## 2023-11-22 DIAGNOSIS — Z79.899 MEDICATION COURSE CHANGED: ICD-10-CM

## 2023-11-22 DIAGNOSIS — I10 PRIMARY HYPERTENSION: ICD-10-CM

## 2023-11-22 DIAGNOSIS — Z78.9 NEVER SMOKED ANY SUBSTANCE: ICD-10-CM

## 2023-11-22 DIAGNOSIS — R00.2 PALPITATIONS: ICD-10-CM

## 2023-11-22 DIAGNOSIS — R06.02 SHORTNESS OF BREATH: ICD-10-CM

## 2023-11-22 DIAGNOSIS — I20.0 UNSTABLE ANGINA PECTORIS (MULTI): ICD-10-CM

## 2023-11-22 DIAGNOSIS — I27.20 PULMONARY HYPERTENSION (MULTI): ICD-10-CM

## 2023-11-22 DIAGNOSIS — Z71.2 ENCOUNTER TO DISCUSS TEST RESULTS: ICD-10-CM

## 2023-11-22 DIAGNOSIS — R93.1 AGATSTON CORONARY ARTERY CALCIUM SCORE LESS THAN 100: ICD-10-CM

## 2023-11-22 PROCEDURE — 99214 OFFICE O/P EST MOD 30 MIN: CPT | Performed by: INTERNAL MEDICINE

## 2023-11-22 PROCEDURE — 3078F DIAST BP <80 MM HG: CPT | Performed by: INTERNAL MEDICINE

## 2023-11-22 PROCEDURE — 1159F MED LIST DOCD IN RCRD: CPT | Performed by: INTERNAL MEDICINE

## 2023-11-22 PROCEDURE — 3075F SYST BP GE 130 - 139MM HG: CPT | Performed by: INTERNAL MEDICINE

## 2023-11-22 PROCEDURE — 1036F TOBACCO NON-USER: CPT | Performed by: INTERNAL MEDICINE

## 2023-11-22 PROCEDURE — 1160F RVW MEDS BY RX/DR IN RCRD: CPT | Performed by: INTERNAL MEDICINE

## 2023-11-22 RX ORDER — LOSARTAN POTASSIUM 50 MG/1
50 TABLET ORAL DAILY
Qty: 90 TABLET | Refills: 2 | Status: SHIPPED | OUTPATIENT
Start: 2023-11-22 | End: 2024-08-18

## 2023-11-22 RX ORDER — METOPROLOL SUCCINATE 25 MG/1
25 TABLET, EXTENDED RELEASE ORAL DAILY
Qty: 90 TABLET | Refills: 2 | Status: SHIPPED | OUTPATIENT
Start: 2023-11-22 | End: 2023-12-01 | Stop reason: SDUPTHER

## 2023-11-22 RX ORDER — CALCIUM CARBONATE 300MG(750)
400 TABLET,CHEWABLE ORAL 2 TIMES DAILY
Qty: 180 TABLET | Refills: 2 | Status: SHIPPED | OUTPATIENT
Start: 2023-11-22

## 2023-12-01 ENCOUNTER — TELEPHONE (OUTPATIENT)
Dept: CARDIOLOGY | Facility: CLINIC | Age: 73
End: 2023-12-01
Payer: MEDICARE

## 2023-12-01 DIAGNOSIS — I10 PRIMARY HYPERTENSION: ICD-10-CM

## 2023-12-01 DIAGNOSIS — R00.2 PALPITATIONS: ICD-10-CM

## 2023-12-01 RX ORDER — METOPROLOL SUCCINATE 25 MG/1
12.5 TABLET, EXTENDED RELEASE ORAL DAILY
Qty: 90 TABLET | Refills: 2
Start: 2023-12-01 | End: 2024-11-30

## 2023-12-01 NOTE — TELEPHONE ENCOUNTER
Patient left  requesting to cut Metoprolol 25 mg tablet in 1/2. Patient states her HR has been in the low 50's since she started on the medication. Please advise.

## 2023-12-07 ENCOUNTER — HOSPITAL ENCOUNTER (OUTPATIENT)
Dept: PHYSICAL THERAPY | Age: 73
Setting detail: THERAPIES SERIES
Discharge: HOME | End: 2023-12-07
Attending: NEUROLOGICAL SURGERY
Payer: MEDICARE

## 2023-12-07 PROCEDURE — 97110 THERAPEUTIC EXERCISES: CPT

## 2023-12-07 PROCEDURE — 97161 PT EVAL LOW COMPLEX 20 MIN: CPT

## 2023-12-07 ASSESSMENT — PAIN DESCRIPTION - LOCATION: LOCATION: NECK

## 2023-12-07 ASSESSMENT — PAIN DESCRIPTION - PAIN TYPE: TYPE: CHRONIC PAIN

## 2023-12-07 ASSESSMENT — PAIN DESCRIPTION - DESCRIPTORS: DESCRIPTORS: ACHING;SHARP

## 2023-12-07 ASSESSMENT — PAIN SCALES - GENERAL: PAINLEVEL_OUTOF10: 0

## 2023-12-07 ASSESSMENT — PAIN DESCRIPTION - ORIENTATION: ORIENTATION: MID

## 2023-12-07 NOTE — PROGRESS NOTES
reviewed and patient agrees with plan of care. Yes  [x]  No  []   Explain:     Sommers Fall Risk Assessment  Risk Factor Scale  Score   History of Falls [] Yes  [x] No 25  0 0   Secondary Diagnosis [] Yes  [x] No 15  0 0   Ambulatory Aid [] Furniture  [x] Crutches/cane/walker  [] None/bedrest/wheelchair/nurse 30  15  0 0   IV/Heparin Lock [] Yes  [x] No 20  0 0   Gait/Transferring [] Impaired  [] Weak  [x] Normal/bedrest/immobile 20  10  0 0   Mental Status [] Forgets limitations  [x] Oriented to own ability 15  0 0      Total:0     Based on the Assessment score: check the appropriate box.   [x]  No intervention needed   Low =   Score of 0-24  []  Use standard prevention interventions Moderate =  Score of 24-44   [] Discuss fall prevention strategies   [] Indicate moderate falls risk on eval  []  Use high risk prevention interventions High = Score of 45 and higher   [] Discuss fall prevention strategies   [] Provide supervision during treatment time      Minutes:  PT Individual Minutes  Time In: 1310  Time Out: 1400  Minutes: 50  Timed Code Treatment Minutes: 15 Minutes  Procedure Minutes: 25 min evaluation + 10 min HP      Timed Activity Minutes Units   Ther Ex 15      Electronically signed by Alden Gosselin, PT on 12/7/23 at 2:59 PM EST

## 2023-12-07 NOTE — THERAPY EVALUATION
[address] [facility]     Ph: [phone]  Fax: [phone]    [x] Certification  [] Recertification [x]  Plan of Care  [] Progress Note [] Discharge      Referring Provider: Greg Hurst MD    From:  Perez March,      Patient: Darrell Hull (15 y.o. female) : 1950 Date: 2023   Medical Diagnosis: Cervical stenosis of spine [M48.02]    Treatment Diagnosis: cervical pain, reduced ROM, reduced strength, decreased posture    Plan of Care/Certification Expiration Date: 24   Progress Report Period from: 2023  to 2023    Visits to Date: 1 No Show: 0 Cancelled Appts:      OBJECTIVE:   Long Term Goals - Time Frame for Long Term Goals : 4 weeks  Goals Current/ Discharge status Status   Long Term Goal 1: patient will improve cervical ROM by >/= 5-10 degs to improve mobility during ADLs General AROM UE: Right WFL, Left WFL      AROM Cervical Spine   Cervical Spine AROM : Painful  Measured as: Degrees  Cervical flexion: 68  Cervical extension: 38  Cervical right lateral: 25  Cervical left lateral: 20  Cervical right rotation: 60  Cervical left rotation: 65  New   Long Term Goal 2: patient will impove mid and low trap strength to 5/5 for improved lifting / carrying and postural mechanics L Upper Trapezius: 5/5  L Middle Trapezius: 4-/5  L Rhomboids: 5/5  L Lower Trapezius: 4-/5  L UE Strength Comment: 5/5  R UE Strength Comment: 5/5  R Upper Trapezius: 5/5  R Middle Trapezius: 4-/5  R Rhomboids: 5/5  R Lower Trapezius: 4/5  R UE Strength Comment: 5/5  New   Long Term Goal 3: patient will report avg pain of </=3/10 during AROM and repetitive movement for increased QOL Pain Screening  Patient Currently in Pain: Yes  Pain Assessment: 0-10  Pain Level: 0  Best Pain Level: 0  Worst Pain Level: 6  Pain Type: Chronic pain  Pain Location: Neck  Pain Orientation: Mid  Pain Descriptors: Aching,

## 2023-12-08 ENCOUNTER — OFFICE VISIT (OUTPATIENT)
Dept: ORTHOPEDIC SURGERY | Facility: CLINIC | Age: 73
End: 2023-12-08
Payer: MEDICARE

## 2023-12-08 DIAGNOSIS — M17.12 PRIMARY OSTEOARTHRITIS OF LEFT KNEE: ICD-10-CM

## 2023-12-08 DIAGNOSIS — M17.11 PRIMARY OSTEOARTHRITIS OF RIGHT KNEE: Primary | ICD-10-CM

## 2023-12-08 PROCEDURE — 20610 DRAIN/INJ JOINT/BURSA W/O US: CPT | Performed by: ORTHOPAEDIC SURGERY

## 2023-12-08 PROCEDURE — 1125F AMNT PAIN NOTED PAIN PRSNT: CPT | Performed by: ORTHOPAEDIC SURGERY

## 2023-12-08 PROCEDURE — 1036F TOBACCO NON-USER: CPT | Performed by: ORTHOPAEDIC SURGERY

## 2023-12-08 PROCEDURE — 99214 OFFICE O/P EST MOD 30 MIN: CPT | Performed by: ORTHOPAEDIC SURGERY

## 2023-12-08 PROCEDURE — 1160F RVW MEDS BY RX/DR IN RCRD: CPT | Performed by: ORTHOPAEDIC SURGERY

## 2023-12-08 PROCEDURE — 1159F MED LIST DOCD IN RCRD: CPT | Performed by: ORTHOPAEDIC SURGERY

## 2023-12-08 RX ORDER — BETAMETHASONE SODIUM PHOSPHATE AND BETAMETHASONE ACETATE 3; 3 MG/ML; MG/ML
12 INJECTION, SUSPENSION INTRA-ARTICULAR; INTRALESIONAL; INTRAMUSCULAR; SOFT TISSUE ONCE
Status: COMPLETED | OUTPATIENT
Start: 2023-12-08 | End: 2023-12-08

## 2023-12-08 RX ORDER — LIDOCAINE HYDROCHLORIDE 10 MG/ML
4 INJECTION INFILTRATION; PERINEURAL ONCE
Status: COMPLETED | OUTPATIENT
Start: 2023-12-08 | End: 2023-12-08

## 2023-12-08 RX ADMIN — LIDOCAINE HYDROCHLORIDE 40 MG: 10 INJECTION INFILTRATION; PERINEURAL at 11:35

## 2023-12-08 RX ADMIN — BETAMETHASONE SODIUM PHOSPHATE AND BETAMETHASONE ACETATE 12 MG: 3; 3 INJECTION, SUSPENSION INTRA-ARTICULAR; INTRALESIONAL; INTRAMUSCULAR; SOFT TISSUE at 11:35

## 2023-12-08 ASSESSMENT — PAIN SCALES - GENERAL: PAINLEVEL_OUTOF10: 8

## 2023-12-08 ASSESSMENT — PAIN - FUNCTIONAL ASSESSMENT: PAIN_FUNCTIONAL_ASSESSMENT: 0-10

## 2023-12-09 NOTE — PROGRESS NOTES
History of present illness    73-year-old female chronic right-sided knee pain no she has significant knee arthritis he is having popping clicking grinding in her knee she had a knee brace which seems to give her some support she had a Medrol Dosepak which helped somewhat with her swelling she had Synvisc in her left knee in the past it caused somewhat of a reaction she is not interested in further gel injections.  She not had any new injury but the right knee is giving her more more trouble particularly on the medial aspect      Past medical , Surgical, Family and social history reviewed.      Physical exam  General: No acute distress and breathing comfortably.  Patient is pleasant and cooperative with the examination.    Extremity  The affected knee was examined and inspected and was tender to touch along the medial aspect.  The patient has catching/locking and occasional mechanical symptoms.  The skin was intact without breakdown or open wounds.  There was a mild Parish exam seen with mild evidence of instability and weakness in the collateral ligaments with laxity to varus valgus stress and in the anterior posterior plane.  There was a negative Lachman's test, pivot shift test, and posterior drawer sign.  There was no foot drop, numbness or tingling.  Sensation, reflexes, and pulses in the foot and ankle were present.  There was an effusion but range of motion was good and straight leg raise testing was normal.   The patient had the ability to bear weight but with discomfort.  The patient's gait was antalgic secondary to the discomfort. Knee range of motion was 5-115    Diagnostics      Holter or Event Cardiac Monitor    Result Date: 11/16/2023  Procedure: 24-hour Holter monitor Indication: Palpitations/chest pain Symptoms: Patient did have symptoms of palpitation with 3 activations around 3:15 PM all associated with normal sinus rhythm rates between 75 and 80 bpm.  No ST changes  identified. Results 24 hours of Holter monitoring was performed.  Patient remained in sinus rhythm throughout with an average heart rate of 79 bpm ranging from 58 bpm to a maximum of 112 bpm the extremes of heart rate are of a sinus mechanism.  Sinus tachycardia did occur with physical activity raking leaves and hands appropriate.  There were only 3 PVCs during the entire recording and no other ventricular ectopy.  There were just over 10 PACs per hour.  There were 3 episodes of SVT the longest being 3 seconds at approximately 130 bpm.  There was a 2-second episode of at about 140 bpm.  The longer episode was clearly atrial tachycardia the second episode probably atrial tachycardia P waves not clear on each beat.  No heart block identified Impression 1.  Predominant rhythm is sinus with average heart rate 79 bpm. 2.  Symptoms of palpitation correlated to normal sinus rhythm 3.  Mild increase in PACs just over 10/h 4.  3 episodes of SVT to clearly atrial tachycardia.  1 episode of 2 seconds probable atrial tachycardia P waves difficult to assess that particular episode 2 seconds in length 5.  No evidence of heart block 6.  No evidence of ventricular tachycardia     Transthoracic Echo (TTE) Complete    Result Date: 11/13/2023   [facility] [address], [address], [place] [address]            Tel [phone] Fax [phone] TRANSTHORACIC ECHOCARDIOGRAM REPORT  Patient Name:      MARQUEZ RICK DUMONT      Reading Physician:    [id number] Manuel Payne MD, [facility] Study Date:        11/13/2023           Ordering Provider:    [id number] KELLI BOB MRN/PID:           [id number]             Fellow: Accession#:        [id number]         Nurse: Date of Birth/Age: 1950 / 73 years Sonographer:          Dacia Jones                                                               RDCS, RT(R), RDMS,                                                                RVT Gender:            F                    Additional Staff: Height:            160.02 cm            Admit Date: Weight:            79.38 kg             Admission Status: BSA:               1.83 m2              Department Location:  [facility] Study Type:    TRANSTHORACIC ECHO (TTE) COMPLETE Diagnosis/ICD: Palpitations-R00.2; Shortness of breath-R06.02; Unstable                angina-I20.0  Study Detail: The following Echo studies were performed: 2D, M-Mode, Doppler and               color flow.  PHYSICIAN INTERPRETATION: Left Ventricle: Left ventricular systolic function is normal, with an estimated ejection fraction of 65%. There are no regional wall motion abnormalities. The left ventricular cavity size is normal. Left ventricular diastolic filling was indeterminate. Diastolic function currently indeterminate. Left Atrium: The left atrium is normal in size. Right Ventricle: The right ventricle is normal in size. There is normal right ventricular global systolic function. Normal RV size and function Mild pulm hypertension RVSP 42 mmHg. Right Atrium: The right atrium is normal in size. Aortic Valve: The aortic valve is trileaflet. There is no evidence of aortic valve regurgitation. The peak instantaneous gradient of the aortic valve is 24.6 mmHg. The mean gradient of the aortic valve is 15.0 mmHg. Mild-moderate aortic stenosis V-max 2.5 m/s mean gradient 15 mmHg. Calculated aortic valve area 1.4 cmï¿½ which probably overestimates the severity slightly. No AI. Mitral Valve: The mitral valve is normal in structure. There is mild mitral valve regurgitation. 1+ MR secondary mild mitral valve leaflet thickening. Tricuspid Valve: The tricuspid valve is structurally normal. There is mild tricuspid regurgitation. Pulmonic Valve: The pulmonic valve is structurally normal. There is no indication of pulmonic  valve regurgitation. Pericardium: There is no pericardial effusion noted. Aorta: The aortic root is normal.  CONCLUSIONS:  1. Left ventricular systolic function is normal with a 65% estimated ejection fraction.  2. Diastolic function currently indeterminate.  3. Normal RV size and function     Mild pulm hypertension RVSP 42 mmHg.  4. 1+ MR secondary mild mitral valve leaflet thickening.  5. Mild-moderate aortic stenosis V-max 2.5 m/s mean gradient 15 mmHg. Calculated aortic valve area 1.4 cmï¿½ which probably overestimates the severity slightly. No AI. QUANTITATIVE DATA SUMMARY: 2D MEASUREMENTS:                          Normal Ranges: Ao Root d:     2.50 cm   (2.0-3.7cm) LAs:           3.00 cm   (2.7-4.0cm) RVIDd:         3.25 cm   (0.9-3.6cm) IVSd:          0.95 cm   (0.6-1.1cm) LVPWd:         0.88 cm   (0.6-1.1cm) LVIDd:         4.43 cm   (3.9-5.9cm) LVIDs:         2.39 cm LV Mass Index: 72.5 g/m2 LV % FS        46.0 % AORTA MEASUREMENTS:                    Normal Ranges: Asc Ao, d: 2.50 cm (2.1-3.4cm) LV SYSTOLIC FUNCTION BY 2D PLANIMETRY (MOD):                     Normal Ranges: EF-A4C View: 67.2 % (>=55%) EF-A2C View: 69.1 % EF-Biplane:  68.0 % LV DIASTOLIC FUNCTION:                               Normal Ranges: MV Peak E:        1.22 m/s    (0.7-1.2 m/s) MV Peak A:        1.17 m/s    (0.42-0.7 m/s) E/A Ratio:        1.04        (1.0-2.2) MV lateral e'     0.07 m/s MV medial e'      0.06 m/s E/e' Ratio:       18.40       (<8.0) PulmV Sys Remy:    64.70 cm/s PulmV Peres Remy:   47.90 cm/s PulmV S/D Remy:    1.40 PulmV A Revs Remy: 26.20 cm/s PulmV A Revs Dur: 123.00 msec MITRAL VALVE:                      Normal Ranges: MV Vmax:    1.26 m/s (<=1.3m/s) MV peak P.4 mmHg (<5mmHg) MV mean PG: 3.0 mmHg (<48mmHg) MV DT:      211 msec (150-240msec) MITRAL INSUFFICIENCY:                      Normal Ranges: MR Vmax: 535.00 cm/s dP/dt:   699 mmHg/s  (>1200mmHg/sec) AORTIC VALVE:                                    Normal  Ranges: AoV Vmax:                2.48 m/s  (<=1.7m/s) AoV Peak P.6 mmHg (<20mmHg) AoV Mean PG:             15.0 mmHg (1.7-11.5mmHg) LVOT Max Remy:            1.43 m/s  (<=1.1m/s) AoV VTI:                 69.90 cm  (18-25cm) LVOT VTI:                37.20 cm LVOT Diameter:           1.80 cm   (1.8-2.4cm) AoV Area, VTI:           1.35 cm2  (2.5-5.5cm2) AoV Area,Vmax:           1.47 cm2  (2.5-4.5cm2) AoV Dimensionless Index: 0.53 TRICUSPID VALVE/RVSP:                             Normal Ranges: Peak TR Velocity: 3.05 m/s RV Syst Pressure: 40.2 mmHg (< 30mmHg) PULMONIC VALVE:                       Normal Ranges: PV Max Remy: 1.0 m/s   (0.6-0.9m/s) PV Max P.1 mmHg PIEDV:      1.33 m/s PADP:       10.1 mmHg Pulmonary Veins: PulmV A Revs Dur: 123.00 msec PulmV A Revs Remy: 26.20 cm/s PulmV Peres Remy:   47.90 cm/s PulmV S/D Remy:    1.40 PulmV Sys Remy:    64.70 cm/s  [id number] Manuel Payne MD, FACC Electronically signed on 2023 at 3:24:31 PM  ** Final **     Nuclear Stress Test    Result Date: 2023  Interpreted By:  Manuel Payne,  Olaf Blunt STUDY: MYOCARDIAL PERFUSION STRESS TEST WITH LEXISCAN   Performing facility: [facility], [address] [place] [address] [facility] Provider:  Kelli Bob MD, FACC PCP:  Dr. HOMAR GLEASON (CNP) Supervising provider:  Manuel Payne MD, FACC   INDICATION: CORONARY ARTERY CALCIUM SCORE <100; NEVER SMOKED; UNSTABLE ANGINA; SOB; PALPITATIONS.   HISTORY: Gender:  F; Age:  74 y/o ; Height:   cm cm; Weight:   WT 78.926 kg kg.   ABNORMAL EKG; PRE DIABETES; HTN; PALPITATIONS; CHEST PAIN; SOB; SYNCOPE; ELEVATED CORONARY ARTERY CALCIUM SCORE.   Denies smoking.   COMPARISON: Previous nuclear testing completed [id number] at [facility].   ACCESSION NUMBER(S): [id number]   ORDERING CLINICIAN: KELLI BOB   TECHNIQUE: ONE DAY protocol. Stress injection: Date:2023, 35.3 mCi of Myoview IV 20 seconds after rapid injection of  Lexiscan. Rest injection: Date: 11/13/2023, 11.3 mCi of Myoview IV at rest. The patient had a rapid injection of 0.4 mg of Lexiscan IV over 10 seconds. Imaging was performed by gated tomographic technique. Reason for Lexiscan: DJD.   STRESS TEST DATA: Resting heart rate was 71 BPM. Resting blood pressure was 152/88 mmHg. Peak blood pressure was 194/100 mmHg. Peak heart rate was 82 BPM.   TEST TERMINATED DUE TO:  Protocol completed.   FINDINGS: STRESS TEST RESULTS:   Resting electrocardiogram revealed normal sinus rhythm normal record. There were no significant ischemic ECG changes or dysrhythmias. The patient did not have chest pains/symptoms during procedure. There was a normal recovery phase.   IMAGING RESULTS:   Image quality was good. Rest and stress tomographic images were reviewed and revealed normal perfusion without evidence of ischemia, myocardial infarction, or left ventricular dilatation with stress. Overall left ventricular systolic function appeared to be normal without regional wall motion abnormalities. Ejection fraction was 84%. TID is 0.79 and is normal. There was no evidence of significant attenuation artifact.       Normal Lexiscan Myoview cardiac perfusion stress test. No evidence of ischemia or myocardial infarction by perfusion imaging. Normal left ventricular systolic function, ejection fraction 84%. Normal Lexiscan perfusion study with preserved LV function likelihood of critical coronary disease would be considered low.     Signed by: Manuel Payne 11/13/2023 2:57 PM Dictation workstation:   [id number]       Procedure  Before aspiration/injection, the risks  of this procedure including but not limited to;  infection, local skin irritation, skin atrophy, calcification, continued pain or discomfort, elevated blood sugar, burning, failure to relieve pain, possible late infection were all discussed with the patient.  The patient verbalized understanding and consented to the procedure.   After  informed consent was provided, patient identification was confirmed, and allergies were verified, the patient was appropriately positioned. The site was marked and time-out performed.  The injection site was prepped in the usual sterile manner to provide a sterile environment. The skin was anesthetized with ethyl chloride spray. The aspiration/injection was performed with standard technique. The needle was withdrawn and the puncture site was secured with a Band-Aid. The patient tolerated the procedure well without complication.   Post-procedure discomfort can be alleviated with additional medication, ice, elevation, and rest over the first 24 hours as recommended.  The injection was right knee 2 cc celestone 4cc lidocaine    Assessment  Right knee arthritis    Treatment plan  1.  The natural history of the condition and its associated treatment alternatives including surgical and nonsurgical options were discussed with the patient at length.  2.  Cortisone injection right knee performed today without complication.  Patient understands the cortisone may provide temporary relief how much it helps her how long it lasts is unpredictable.  Cortisone can be repeated after 3 months if symptoms return.  3.  Follow-up as needed  4.  All of the patient's questions were answered.    This note was prepared using voice recognition software.  The details of this note are correct and have been reviewed, and corrected to the best of my ability.  Some grammatical areas may persist related to the Dragon software    Shane De La Garza MD  Senior Attending Physician  [facility]  Orthopedic [place]    [phone]

## 2023-12-14 ENCOUNTER — HOSPITAL ENCOUNTER (OUTPATIENT)
Dept: PHYSICAL THERAPY | Age: 73
Setting detail: THERAPIES SERIES
Discharge: HOME | End: 2023-12-14
Attending: NEUROLOGICAL SURGERY
Payer: MEDICARE

## 2023-12-14 PROCEDURE — 97140 MANUAL THERAPY 1/> REGIONS: CPT

## 2023-12-14 PROCEDURE — 97110 THERAPEUTIC EXERCISES: CPT

## 2023-12-14 NOTE — PROGRESS NOTES
[facility]  Outpatient Physical Therapy   Treatment Note        Date: 2023  Patient: Fantasma Nails  : 1950   Confirmed: Yes  MRN: [id number]  Referring Provider: lAexis Gimenez MD      Medical Diagnosis: Cervical stenosis of spine [M48.02]      Treatment Diagnosis: cervical pain, reduced ROM, reduced strength, decreased posture    Visit Information:  Insurance: Payor: Danisha Swift / Plan: Isidro Verduog / Product Type: *No Product type* /   PT Visit Information  Onset Date:  (2022)  PT Insurance Information: [facility] Medicare  Total # of Visits Approved:  (BMN)  Total # of Visits to Date: 2  Plan of Care/Certification Expiration Date: 24  No Show: 0  Progress Note Due Date: 24  Canceled Appointment: 0  Progress Note Counter: -    Subjective Information:  Subjective: Pt reports \"no pain just tight\"  HEP Compliance:  [x] Good [] Fair [] Poor [] Reports not doing due to:               Pain Screening  Patient Currently in Pain: Denies    Treatment:  Exercises:  Exercises  Exercise 1: supine chin tucks 10 reps x 5 second holds  Exercise 2: seated cervical isometrics: flexion, extension, sin SBing 10 reps x 5sec holds  Exercise 3: Upper trap / levator str 30 sec x 3  Exercise 4: towel rotation  3-5 sec x 10/ extension*NV  Exercise 8: scapular retractions 5 sec x 10  Exercise 20: HEP: Continue current + UT, LS scap retract, Towel rot. Manual:   Manual Therapy  Manual Traction: subocciptial release  Soft Tissue Mobilizaton: Upper trap TPR, STM Sin.  14 minutes total.  Other: IDN* cupping* Ktape*       Modalities:  Moist Heat (CPT 10890)  Patient Position: Seated  Number Minutes Moist Heat: 10  Moist heat location: Cervical  Post treatment skin assessment: Redness - no adverse reaction  Limitations addressed: Pain modulation, Tissue extensibility       *Indicates exercise, modality, or manual techniques to be initiated when appropriate    Objective Measures:

## 2023-12-28 ENCOUNTER — HOSPITAL ENCOUNTER (OUTPATIENT)
Dept: PHYSICAL THERAPY | Age: 73
Setting detail: THERAPIES SERIES
Discharge: HOME | End: 2023-12-28
Attending: NEUROLOGICAL SURGERY
Payer: MEDICARE

## 2023-12-29 ENCOUNTER — OFFICE VISIT (OUTPATIENT)
Dept: FAMILY MEDICINE CLINIC | Age: 73
End: 2023-12-29

## 2023-12-29 VITALS
HEART RATE: 79 BPM | WEIGHT: 169 LBS | DIASTOLIC BLOOD PRESSURE: 82 MMHG | BODY MASS INDEX: 29.95 KG/M2 | OXYGEN SATURATION: 97 % | SYSTOLIC BLOOD PRESSURE: 128 MMHG | HEIGHT: 63 IN | TEMPERATURE: 97.8 F

## 2023-12-29 DIAGNOSIS — B96.89 BACTERIAL CONJUNCTIVITIS OF BOTH EYES: ICD-10-CM

## 2023-12-29 DIAGNOSIS — J01.90 ACUTE BACTERIAL SINUSITIS: Primary | ICD-10-CM

## 2023-12-29 DIAGNOSIS — R05.1 ACUTE COUGH: ICD-10-CM

## 2023-12-29 DIAGNOSIS — H10.9 BACTERIAL CONJUNCTIVITIS OF BOTH EYES: ICD-10-CM

## 2023-12-29 DIAGNOSIS — J01.90 ACUTE BACTERIAL SINUSITIS: ICD-10-CM

## 2023-12-29 DIAGNOSIS — B96.89 ACUTE BACTERIAL SINUSITIS: ICD-10-CM

## 2023-12-29 DIAGNOSIS — B96.89 ACUTE BACTERIAL SINUSITIS: Primary | ICD-10-CM

## 2023-12-29 LAB
INFLUENZA A ANTIBODY: NORMAL
INFLUENZA B ANTIBODY: NORMAL
Lab: NORMAL
PERFORMING INSTRUMENT: NORMAL
QC PASS/FAIL: NORMAL
SARS-COV-2, POC: NORMAL

## 2023-12-29 RX ORDER — FLUTICASONE PROPIONATE 50 MCG
2 SPRAY, SUSPENSION (ML) NASAL DAILY
Qty: 48 G | OUTPATIENT
Start: 2023-12-29

## 2023-12-29 RX ORDER — POLYMYXIN B SULFATE AND TRIMETHOPRIM 1; 10000 MG/ML; [USP'U]/ML
1 SOLUTION OPHTHALMIC EVERY 4 HOURS
Qty: 10 ML | Refills: 0 | Status: SHIPPED | OUTPATIENT
Start: 2023-12-29 | End: 2024-01-08

## 2023-12-29 RX ORDER — DOXYCYCLINE HYCLATE 100 MG
100 TABLET ORAL 2 TIMES DAILY
Qty: 20 TABLET | Refills: 0 | Status: SHIPPED | OUTPATIENT
Start: 2023-12-29 | End: 2024-01-08

## 2023-12-29 RX ORDER — FLUTICASONE PROPIONATE 50 MCG
2 SPRAY, SUSPENSION (ML) NASAL DAILY
Qty: 16 G | Refills: 0 | Status: SHIPPED | OUTPATIENT
Start: 2023-12-29

## 2024-01-03 NOTE — PROGRESS NOTES
Therapy                            Cancellation/No-show Note    Date: 2024  Patient: Ester Holley (73 y.o. female)  : 1950  MRN:  [id number]  Referring Physician: Duke Bhagat MD    Medical Diagnosis: Cervical stenosis of spine [M48.02]      Visit Information:  Insurance: Payor: [facility] MEDICARE / Plan: [facility] MEDICARE COMPLETE / Product Type: *No Product type* /   Visits to Date: 3   No Show/Cancelled Appts: 0 /       For today's appointment patient:  [x]  Cancelled  []  Rescheduled appointment  []  No-show   []  Called pt to remind of next appointment     Reason given by patient:  [x]  Patient ill  []  Conflicting appointment  []  No transportation    []  Conflict with work  []  No reason given  []  Other:      [x] Pt has future appointments scheduled, no follow up needed  [] Pt requests to be on hold.    Reason:   If > 2 weeks please discuss with therapist.  [] Therapist to call pt for follow up     Comments:       Signature: Electronically signed by Svetlana Lopez PTA on 1/3/24 at 2:18 PM EST

## 2024-01-04 ENCOUNTER — HOSPITAL ENCOUNTER (OUTPATIENT)
Dept: PHYSICAL THERAPY | Age: 74
Setting detail: THERAPIES SERIES
Discharge: HOME | End: 2024-01-04
Attending: NEUROLOGICAL SURGERY

## 2024-01-11 ENCOUNTER — HOSPITAL ENCOUNTER (OUTPATIENT)
Dept: PHYSICAL THERAPY | Age: 74
Setting detail: THERAPIES SERIES
Discharge: HOME | End: 2024-01-11
Attending: NEUROLOGICAL SURGERY
Payer: MEDICARE

## 2024-01-11 PROCEDURE — 97110 THERAPEUTIC EXERCISES: CPT

## 2024-01-11 PROCEDURE — 97140 MANUAL THERAPY 1/> REGIONS: CPT

## 2024-01-11 NOTE — PROGRESS NOTES
[facility]  Outpatient Physical Therapy    Treatment Note        Date: 2024  Patient: Ester Holley  : 1950   Confirmed: Yes  MRN: [id number]  Referring Provider: Duke Bhagat MD    Medical Diagnosis: Cervical stenosis of spine [M48.02]       Treatment Diagnosis: cervical pain, reduced ROM, reduced strength, decreased posture    Visit Information:  Insurance: Payor: [employer] MEDICARE / Plan: [employer] MEDICARE COMPLETE / Product Type: *No Product type* /   PT Visit Information  Onset Date:  (2022)  PT Insurance Information: [employer] Medicare  Total # of Visits Approved:  (BMN)  Total # of Visits to Date: 4  Plan of Care/Certification Expiration Date: 24  No Show: 0  Progress Note Due Date: 24  Canceled Appointment: 2  Progress Note Counter: 3/4-    Subjective Information:  Subjective: Pt reports hasnt done much exercises as she has been ill. reported 9 minutes late.  HEP Compliance:  [] Good [] Fair [x] Poor [x] Reports not doing due to: Recent illness               Pain Screening  Patient Currently in Pain: Denies    Treatment:  Exercises:  Exercises  Exercise 1: supine chin tucks 20 reps x 5 second holds  Exercise 3: Upper trap / levator str 20 sec x 3  Exercise 4: towel rotation/extension  3-5 sec x 10  Exercise 5: sin ER YTB x 10  Exercise 9: thoracic extension seated EOM 3'' x10  Exercise 20: HEP: Continue current + increase frequency       Manual:           Modalities:          *Indicates exercise, modality, or manual techniques to be initiated when appropriate    Objective Measures:                                 Strength: [] NT  [] MMT completed:                   ROM: [] NT  [] ROM measurements:                                                                             Assessment:   Body Structures, Functions, Activity Limitations Requiring Skilled Therapeutic Intervention: Decreased ROM, Decreased body mechanics, Decreased strength, Decreased posture, Increased pain   
Decreased body mechanics, Decreased strength, Decreased posture, Increased pain  Assessment: Pt reported to session 9 minutes late, able to accomodate. Pt stated non compliate with HEP secodary to illness. Restarted exercises today with good tolerance. Mild tightness noted R>L cervical & scapular MM decreased with manual techniques.  Treatment Diagnosis: cervical pain, reduced ROM, reduced strength, decreased posture  Therapy Prognosis: Good          Post-Pain Assessment:       Pain Rating (0-10 pain scale):   0/10   Location and pain description same as pre-treatment unless indicated.   Action: [] NA   [x] Perform HEP  [] Meds as prescribed  [] Modalities as prescribed   [] Call Physician     GOALS   Patient Goal(s): Patient Goals : \"comfort\"    Long Term Goals Completed by 4 weeks Goal Status   LTG 1 patient will improve cervical ROM by >/= 5-10 degs to improve mobility during ADLs In progress   LTG 2 patient will impove mid and low trap strength to 5/5 for improved lifting / carrying and postural mechanics In progress   LTG 3 patient will report avg pain of </=3/10 during AROM and repetitive movement for increased QOL In progress   LTG 4 patient will improve NDI to </=4/50 for improved subjective function In progress   LTG 5 patient will be indep with HEP to self manage symptoms upon d/c In progress          Plan:  Frequency/Duration:  Plan  Plan Frequency: 1x  Plan weeks: 4-6  Current Treatment Recommendations: Strengthening, ROM, Neuromuscular re-education, Manual, Pain management, Home exercise program, Safety education & training, Patient/Caregiver education & training, Modalities, Dry needling  Modalities: Heat/Cold, E-stim - unattended  Pt to continue current HEP.  See objective section for any therapeutic exercise changes, additions or modifications this date.    Therapy Time:      PT Individual Minutes  Time In: 1349  Time Out: 1430  Minutes: 41  Timed Code Treatment Minutes: 41 Minutes  Procedure 
Minutes:0  Timed Activity Minutes Units   Ther Ex 32 2   Manual  9 1     Electronically signed by Svetlana Lopez PTA on 1/11/24 at 2:38 PM EST

## 2024-01-18 ENCOUNTER — HOSPITAL ENCOUNTER (OUTPATIENT)
Dept: PHYSICAL THERAPY | Age: 74
Setting detail: THERAPIES SERIES
Discharge: HOME | End: 2024-01-18
Attending: NEUROLOGICAL SURGERY
Payer: MEDICARE

## 2024-01-18 PROCEDURE — 97110 THERAPEUTIC EXERCISES: CPT

## 2024-01-18 NOTE — PROGRESS NOTES
trap: 4-/5; R low trap: 4-/5 ; L mid trap: 4/5 ; L low trap: 4-/5  LTG 3 Current Status:: 1/18: patient states pain is very little  LTG 4 Current Status:: 1/18: 5/50  LTG 5 Current Status:: 1/18: reports understanding and compliance    Assessment:   Body Structures, Functions, Activity Limitations Requiring Skilled Therapeutic Intervention: Decreased ROM, Decreased body mechanics, Decreased strength, Decreased posture, Increased pain  Assessment: Patient is a 72 yo presenting with neck pain. patient presented to 5 skilled PT visits. Patient requesting discharge at this time. Reviewed previously given stretches and incorporated more scapular strengthening for further improvements indep at home. Patient demonstrated improvements in ROM, pain and overall subjective function. patient appropriate for discharge at this time.  Treatment Diagnosis: cervical pain, reduced ROM, reduced strength, decreased posture  Therapy Prognosis: Good  PT Education: Body mechanics, Anatomy of condition, Home Exercise Program  Activity Tolerance  Activity Tolerance: Patient tolerated treatment well    Post-Pain Assessment:       Pain Rating (0-10 pain scale):  0 /10   Location and pain description same as pre-treatment unless indicated.   Action: [] NA   [x] Perform HEP  [] Meds as prescribed  [] Modalities as prescribed   [] Call Physician     GOALS   Patient Goal(s): Patient Goals : \"comfort\"    Long Term Goals Completed by 4 weeks Goal Status   LTG 1 patient will improve cervical ROM by >/= 5-10 degs to improve mobility during ADLs Partially met   LTG 2 patient will impove mid and low trap strength to 5/5 for improved lifting / carrying and postural mechanics Not Met   LTG 3 patient will report avg pain of </=3/10 during AROM and repetitive movement for increased QOL Met   LTG 4 patient will improve NDI to </=4/50 for improved subjective function Partially met   LTG 5 patient will be indep with HEP to self manage symptoms upon d/c Met

## 2024-01-18 NOTE — THERAPY DISCHARGE
strength, Decreased posture, Increased pain  Assessment: Patient is a 74 yo presenting with neck pain. patient presented to 5 skilled PT visits. Patient requesting discharge at this time. Reviewed previously given stretches and incorporated more scapular strengthening for further improvements indep at home. Patient demonstrated improvements in ROM, pain and overall subjective function. patient appropriate for discharge at this time.  Therapy Prognosis: Good    PT Education: Body mechanics;Anatomy of condition;Home Exercise Program    PLAN: [] Evaluate and Treat  Frequency/Duration:  Additional Comments: discharge                   Patient Status:[x] Continue/ Initiate plan of Care     [] Discharge PT.  Recommend pt continue with HEP.      [] Additional visits requested, Please re-certify for additional visits:     [] Hold     Signature:  Electronically signed by Leny Cartagena PT on 1/18/24 at 12:18 PM EST        If you have any questions or concerns, please don't hesitate to call.  Thank you for your referral.

## 2024-02-06 ENCOUNTER — OFFICE VISIT (OUTPATIENT)
Dept: FAMILY MEDICINE CLINIC | Age: 74
End: 2024-02-06
Payer: MEDICARE

## 2024-02-06 VITALS
BODY MASS INDEX: 29.59 KG/M2 | WEIGHT: 167 LBS | SYSTOLIC BLOOD PRESSURE: 134 MMHG | HEIGHT: 63 IN | DIASTOLIC BLOOD PRESSURE: 70 MMHG

## 2024-02-06 VITALS — HEIGHT: 63 IN | DIASTOLIC BLOOD PRESSURE: 70 MMHG | BODY MASS INDEX: 29.94 KG/M2 | SYSTOLIC BLOOD PRESSURE: 134 MMHG

## 2024-02-06 DIAGNOSIS — E66.3 OVERWEIGHT WITH BODY MASS INDEX (BMI) OF 29 TO 29.9 IN ADULT: ICD-10-CM

## 2024-02-06 DIAGNOSIS — Z00.00 MEDICARE ANNUAL WELLNESS VISIT, SUBSEQUENT: Primary | ICD-10-CM

## 2024-02-06 DIAGNOSIS — F33.0 MAJOR DEPRESSIVE DISORDER, RECURRENT, MILD (HCC): Primary | ICD-10-CM

## 2024-02-06 PROCEDURE — 1036F TOBACCO NON-USER: CPT | Performed by: NURSE PRACTITIONER

## 2024-02-06 PROCEDURE — 1123F ACP DISCUSS/DSCN MKR DOCD: CPT | Performed by: NURSE PRACTITIONER

## 2024-02-06 PROCEDURE — 99214 OFFICE O/P EST MOD 30 MIN: CPT | Performed by: NURSE PRACTITIONER

## 2024-02-06 PROCEDURE — G8428 CUR MEDS NOT DOCUMENT: HCPCS | Performed by: NURSE PRACTITIONER

## 2024-02-06 PROCEDURE — G0439 PPPS, SUBSEQ VISIT: HCPCS | Performed by: NURSE PRACTITIONER

## 2024-02-06 PROCEDURE — 3017F COLORECTAL CA SCREEN DOC REV: CPT | Performed by: NURSE PRACTITIONER

## 2024-02-06 PROCEDURE — 3075F SYST BP GE 130 - 139MM HG: CPT | Performed by: NURSE PRACTITIONER

## 2024-02-06 PROCEDURE — G8399 PT W/DXA RESULTS DOCUMENT: HCPCS | Performed by: NURSE PRACTITIONER

## 2024-02-06 PROCEDURE — 3078F DIAST BP <80 MM HG: CPT | Performed by: NURSE PRACTITIONER

## 2024-02-06 PROCEDURE — G8484 FLU IMMUNIZE NO ADMIN: HCPCS | Performed by: NURSE PRACTITIONER

## 2024-02-06 PROCEDURE — 1090F PRES/ABSN URINE INCON ASSESS: CPT | Performed by: NURSE PRACTITIONER

## 2024-02-06 PROCEDURE — G8417 CALC BMI ABV UP PARAM F/U: HCPCS | Performed by: NURSE PRACTITIONER

## 2024-02-06 RX ORDER — PHENTERMINE HYDROCHLORIDE 37.5 MG/1
37.5 TABLET ORAL
Qty: 30 TABLET | Refills: 0 | Status: SHIPPED | OUTPATIENT
Start: 2024-02-06 | End: 2024-03-07

## 2024-02-06 ASSESSMENT — PATIENT HEALTH QUESTIONNAIRE - PHQ9
SUM OF ALL RESPONSES TO PHQ QUESTIONS 1-9: 0
5. POOR APPETITE OR OVEREATING: 0
SUM OF ALL RESPONSES TO PHQ QUESTIONS 1-9: 0
9. THOUGHTS THAT YOU WOULD BE BETTER OFF DEAD, OR OF HURTING YOURSELF: 0
4. FEELING TIRED OR HAVING LITTLE ENERGY: 0
SUM OF ALL RESPONSES TO PHQ QUESTIONS 1-9: 0
8. MOVING OR SPEAKING SO SLOWLY THAT OTHER PEOPLE COULD HAVE NOTICED. OR THE OPPOSITE, BEING SO FIGETY OR RESTLESS THAT YOU HAVE BEEN MOVING AROUND A LOT MORE THAN USUAL: 0
10. IF YOU CHECKED OFF ANY PROBLEMS, HOW DIFFICULT HAVE THESE PROBLEMS MADE IT FOR YOU TO DO YOUR WORK, TAKE CARE OF THINGS AT HOME, OR GET ALONG WITH OTHER PEOPLE: 0
3. TROUBLE FALLING OR STAYING ASLEEP: 0
6. FEELING BAD ABOUT YOURSELF - OR THAT YOU ARE A FAILURE OR HAVE LET YOURSELF OR YOUR FAMILY DOWN: 0
2. FEELING DOWN, DEPRESSED OR HOPELESS: 0
7. TROUBLE CONCENTRATING ON THINGS, SUCH AS READING THE NEWSPAPER OR WATCHING TELEVISION: 0
1. LITTLE INTEREST OR PLEASURE IN DOING THINGS: 0
SUM OF ALL RESPONSES TO PHQ QUESTIONS 1-9: 0
SUM OF ALL RESPONSES TO PHQ9 QUESTIONS 1 & 2: 0

## 2024-02-06 ASSESSMENT — ENCOUNTER SYMPTOMS
ORTHOPNEA: 0
SHORTNESS OF BREATH: 0
BLOOD IN STOOL: 0
BLURRED VISION: 0
EYES NEGATIVE: 1
DIARRHEA: 0
ANAL BLEEDING: 0
RECTAL PAIN: 0
COLOR CHANGE: 0
CONSTIPATION: 0
ALLERGIC/IMMUNOLOGIC NEGATIVE: 1
TROUBLE SWALLOWING: 0
VOICE CHANGE: 0
ABDOMINAL PAIN: 0
RESPIRATORY NEGATIVE: 1
GASTROINTESTINAL NEGATIVE: 1

## 2024-02-06 ASSESSMENT — LIFESTYLE VARIABLES
HOW OFTEN DO YOU HAVE A DRINK CONTAINING ALCOHOL: MONTHLY OR LESS
HOW MANY STANDARD DRINKS CONTAINING ALCOHOL DO YOU HAVE ON A TYPICAL DAY: 1 OR 2

## 2024-02-06 NOTE — PROGRESS NOTES
Medicare Annual Wellness Visit    Ester Holley is here for Medicare AWV and Health Maintenance (Refuse colonoscopy and mammogram )    Assessment & Plan   Medicare annual wellness visit, subsequent  Recommendations for Preventive Services Due: see orders and patient instructions/AVS.  Recommended screening schedule for the next 5-10 years is provided to the patient in written form: see Patient Instructions/AVS.     Return in 1 year (on 2/6/2025).     Subjective       Patient's complete Health Risk Assessment and screening values have been reviewed and are found in Flowsheets. The following problems were reviewed today and where indicated follow up appointments were made and/or referrals ordered.    No Positive Risk Factors identified today.                                  Objective   Vitals:    02/06/24 1024   BP: 134/70   Weight: 75.8 kg (167 lb)   Height: 1.6 m (5' 3\")      Body mass index is 29.58 kg/m².               Allergies   Allergen Reactions    Lidocaine Angioedema    Penicillins Hives     Prior to Visit Medications    Medication Sig Taking? Authorizing Provider   phentermine (ADIPEX-P) 37.5 MG tablet Take 1 tablet by mouth every morning (before breakfast) for 30 days.  Max Daily Amount: 37.5 mg  Jennifer Salazar APRN - CNP   fluticasone (FLONASE) 50 MCG/ACT nasal spray 2 sprays by Each Nostril route daily  Shana Jay APRN - CNP   busPIRone (BUSPAR) 10 MG tablet Take 1 tablet by mouth 2 times daily  Shaun Vázquez MD   losartan (COZAAR) 50 MG tablet Take 1 tablet by mouth daily  ProviderJessica MD   Cholecalciferol (VITAMIN D3) 50 MCG (2000 UT) TABS TAKE 1 TABLET BY MOUTH DAILY  Jennifer Salazar APRN - CNP   ibuprofen (ADVIL;MOTRIN) 800 MG tablet TAKE 1 TABLET BY MOUTH THREE TIMES DAILY AFTER MEALS  ProviderJessica MD   lidocaine-prilocaine (EMLA) 2.5-2.5 % cream Apply topically of the neck when needed  Shaun Vázquez MD   PARoxetine (PAXIL) 10 MG tablet Take 1 tablet

## 2024-02-06 NOTE — PROGRESS NOTES
Subjective  Ester Holley, 73 y.o. female presents today with:  Chief Complaint   Patient presents with   • Weight Management     adipex        Adipex  BMI 29.6      Loss 9    Hypertension  This is a chronic problem. The problem is controlled. Pertinent negatives include no anxiety, blurred vision, chest pain, headaches, malaise/fatigue, neck pain, orthopnea, palpitations, peripheral edema, PND, shortness of breath or sweats. Risk factors for coronary artery disease include post-menopausal state, stress and sedentary lifestyle. Past treatments include angiotensin blockers.   Weight Management  Pertinent negatives include no abdominal pain, chest pain, fatigue, headaches, neck pain, rash or weakness.   Saw mehran got MRI C-spine has bulging disks and stenosis      Review of Systems   Constitutional: Negative.  Negative for activity change, appetite change, fatigue, malaise/fatigue and unexpected weight change.   HENT: Negative.  Negative for dental problem, nosebleeds, trouble swallowing and voice change.    Eyes: Negative.  Negative for blurred vision and visual disturbance.   Respiratory: Negative.  Negative for shortness of breath.    Cardiovascular: Negative.  Negative for chest pain, palpitations, orthopnea, leg swelling and PND.   Gastrointestinal: Negative.  Negative for abdominal pain, anal bleeding, blood in stool, constipation, diarrhea and rectal pain.   Endocrine: Negative.  Negative for cold intolerance, heat intolerance, polydipsia, polyphagia and polyuria.   Genitourinary: Negative.    Musculoskeletal: Negative.  Negative for neck pain.   Skin: Negative.  Negative for color change and rash.   Allergic/Immunologic: Negative.    Neurological: Negative.  Negative for dizziness, syncope, weakness and headaches.   Hematological: Negative.  Negative for adenopathy. Does not bruise/bleed easily.   Psychiatric/Behavioral: Negative.  Negative for dysphoric mood and sleep disturbance. The patient is

## 2024-02-09 ENCOUNTER — OFFICE VISIT (OUTPATIENT)
Dept: ORTHOPEDIC SURGERY | Facility: CLINIC | Age: 74
End: 2024-02-09
Payer: MEDICARE

## 2024-02-09 DIAGNOSIS — M17.11 PRIMARY OSTEOARTHRITIS OF RIGHT KNEE: ICD-10-CM

## 2024-02-09 DIAGNOSIS — M17.12 PRIMARY OSTEOARTHRITIS OF LEFT KNEE: Primary | ICD-10-CM

## 2024-02-09 PROCEDURE — 1125F AMNT PAIN NOTED PAIN PRSNT: CPT | Performed by: ORTHOPAEDIC SURGERY

## 2024-02-09 PROCEDURE — 1159F MED LIST DOCD IN RCRD: CPT | Performed by: ORTHOPAEDIC SURGERY

## 2024-02-09 PROCEDURE — 1036F TOBACCO NON-USER: CPT | Performed by: ORTHOPAEDIC SURGERY

## 2024-02-09 PROCEDURE — 99213 OFFICE O/P EST LOW 20 MIN: CPT | Performed by: ORTHOPAEDIC SURGERY

## 2024-02-09 PROCEDURE — 20610 DRAIN/INJ JOINT/BURSA W/O US: CPT | Performed by: ORTHOPAEDIC SURGERY

## 2024-02-09 RX ADMIN — LIDOCAINE HYDROCHLORIDE 4 ML: 10 INJECTION INFILTRATION; PERINEURAL at 11:12

## 2024-02-09 RX ADMIN — BETAMETHASONE SODIUM PHOSPHATE AND BETAMETHASONE ACETATE 2 ML: 3; 3 INJECTION, SUSPENSION INTRA-ARTICULAR; INTRALESIONAL; INTRAMUSCULAR; SOFT TISSUE at 11:12

## 2024-02-09 ASSESSMENT — PAIN SCALES - GENERAL: PAINLEVEL_OUTOF10: 6

## 2024-02-09 ASSESSMENT — PAIN - FUNCTIONAL ASSESSMENT: PAIN_FUNCTIONAL_ASSESSMENT: 0-10

## 2024-02-09 NOTE — PROGRESS NOTES
History of present illness    73-year-old female follow-up of her left knee she had previous cortisone in her right knee back in December with good relief she had Synvisc 1 in her left knee back in August and did not have much improvement in fact seem to have a reaction to the Synvisc she is states that the left knee is giving her more more trouble she is having popping clicking grinding and swelling no new injury.      Past medical , Surgical, Family and social history reviewed.      Physical exam  General: No acute distress and breathing comfortably.  Patient is pleasant and cooperative with the examination.    Extremity  Left knee has crepitus with range of motion tenderness medial joint space no instability brisk cap refill compartments soft calves nontender    Diagnostics      No results found.     Procedure  See dictated procedure note    Assessment  Left knee arthritis    Treatment plan  1.  The natural history of the condition and its associated treatment alternatives including surgical and nonsurgical options were discussed with the patient at length.  2.  Cortisone injection left knee performed today with her consent without complication.  Patient understands the cortisone may provide temporary relief how much it helps her how long it lasts is unpredictable.  Cortisone can be repeated after 3 months if symptoms return.  3.  She is requesting prescription for handicap placard which is provided today.  She will follow-up with me as needed.  4.  All of the patient's questions were answered.    Orders Placed This Encounter    L Inj/Asp: L knee    Disability Placard       This note was prepared using voice recognition software.  The details of this note are correct and have been reviewed, and corrected to the best of my ability.  Some grammatical areas may persist related to the Dragon software    Shane De La Garza MD  Senior Attending Physician  [facility]  [place]    [phone]      L Inj/Asp: L knee on 2/9/2024 11:12 AM  Indications: pain and diagnostic evaluation  Details: 22 G needle, anteromedial approach  Medications: 2 mL betamethasone acet,sod phos 6 mg/mL; 4 mL lidocaine 10 mg/mL (1 %)  Outcome: tolerated well, no immediate complications  Procedure, treatment alternatives, risks and benefits explained, specific risks discussed. Consent was given by the patient. Immediately prior to procedure a time out was called to verify the correct patient, procedure, equipment, support staff and site/side marked as required. Patient was prepped and draped in the usual sterile fashion.

## 2024-02-10 RX ORDER — BETAMETHASONE SODIUM PHOSPHATE AND BETAMETHASONE ACETATE 3; 3 MG/ML; MG/ML
2 INJECTION, SUSPENSION INTRA-ARTICULAR; INTRALESIONAL; INTRAMUSCULAR; SOFT TISSUE
Status: COMPLETED | OUTPATIENT
Start: 2024-02-09 | End: 2024-02-09

## 2024-02-10 RX ORDER — LIDOCAINE HYDROCHLORIDE 10 MG/ML
4 INJECTION INFILTRATION; PERINEURAL
Status: COMPLETED | OUTPATIENT
Start: 2024-02-09 | End: 2024-02-09

## 2024-03-26 PROBLEM — I27.20 PULMONARY HYPERTENSION (HCC): Status: ACTIVE | Noted: 2023-11-22

## 2024-04-08 SDOH — ECONOMIC STABILITY: INCOME INSECURITY: HOW HARD IS IT FOR YOU TO PAY FOR THE VERY BASICS LIKE FOOD, HOUSING, MEDICAL CARE, AND HEATING?: NOT HARD AT ALL

## 2024-04-08 SDOH — ECONOMIC STABILITY: FOOD INSECURITY: WITHIN THE PAST 12 MONTHS, YOU WORRIED THAT YOUR FOOD WOULD RUN OUT BEFORE YOU GOT MONEY TO BUY MORE.: PATIENT DECLINED

## 2024-04-08 SDOH — ECONOMIC STABILITY: FOOD INSECURITY: WITHIN THE PAST 12 MONTHS, THE FOOD YOU BOUGHT JUST DIDN'T LAST AND YOU DIDN'T HAVE MONEY TO GET MORE.: PATIENT DECLINED

## 2024-04-11 ENCOUNTER — OFFICE VISIT (OUTPATIENT)
Dept: FAMILY MEDICINE CLINIC | Age: 74
End: 2024-04-11
Payer: MEDICARE

## 2024-04-11 VITALS
BODY MASS INDEX: 29.95 KG/M2 | HEIGHT: 63 IN | HEART RATE: 76 BPM | DIASTOLIC BLOOD PRESSURE: 82 MMHG | WEIGHT: 169 LBS | SYSTOLIC BLOOD PRESSURE: 128 MMHG

## 2024-04-11 DIAGNOSIS — E66.9 CLASS 1 OBESITY WITH SERIOUS COMORBIDITY AND BODY MASS INDEX (BMI) OF 30.0 TO 30.9 IN ADULT, UNSPECIFIED OBESITY TYPE: ICD-10-CM

## 2024-04-11 DIAGNOSIS — E55.9 VITAMIN D DEFICIENCY: ICD-10-CM

## 2024-04-11 DIAGNOSIS — I27.20 PULMONARY HYPERTENSION (HCC): Primary | ICD-10-CM

## 2024-04-11 DIAGNOSIS — R73.01 ELEVATED FASTING GLUCOSE: ICD-10-CM

## 2024-04-11 DIAGNOSIS — I10 PRIMARY HYPERTENSION: ICD-10-CM

## 2024-04-11 DIAGNOSIS — F33.0 MAJOR DEPRESSIVE DISORDER, RECURRENT, MILD (HCC): ICD-10-CM

## 2024-04-11 PROCEDURE — 99214 OFFICE O/P EST MOD 30 MIN: CPT | Performed by: NURSE PRACTITIONER

## 2024-04-11 PROCEDURE — G8417 CALC BMI ABV UP PARAM F/U: HCPCS | Performed by: NURSE PRACTITIONER

## 2024-04-11 PROCEDURE — G8427 DOCREV CUR MEDS BY ELIG CLIN: HCPCS | Performed by: NURSE PRACTITIONER

## 2024-04-11 PROCEDURE — 3017F COLORECTAL CA SCREEN DOC REV: CPT | Performed by: NURSE PRACTITIONER

## 2024-04-11 PROCEDURE — 3079F DIAST BP 80-89 MM HG: CPT | Performed by: NURSE PRACTITIONER

## 2024-04-11 PROCEDURE — 3074F SYST BP LT 130 MM HG: CPT | Performed by: NURSE PRACTITIONER

## 2024-04-11 PROCEDURE — 1123F ACP DISCUSS/DSCN MKR DOCD: CPT | Performed by: NURSE PRACTITIONER

## 2024-04-11 PROCEDURE — 1090F PRES/ABSN URINE INCON ASSESS: CPT | Performed by: NURSE PRACTITIONER

## 2024-04-11 PROCEDURE — G8399 PT W/DXA RESULTS DOCUMENT: HCPCS | Performed by: NURSE PRACTITIONER

## 2024-04-11 PROCEDURE — 1036F TOBACCO NON-USER: CPT | Performed by: NURSE PRACTITIONER

## 2024-04-11 RX ORDER — PHENTERMINE HYDROCHLORIDE 37.5 MG/1
37.5 TABLET ORAL
Qty: 30 TABLET | Refills: 0 | Status: SHIPPED | OUTPATIENT
Start: 2024-04-11 | End: 2024-05-11

## 2024-04-12 ENCOUNTER — OFFICE VISIT (OUTPATIENT)
Dept: ORTHOPEDIC SURGERY | Facility: CLINIC | Age: 74
End: 2024-04-12
Payer: MEDICARE

## 2024-04-12 DIAGNOSIS — M17.11 PRIMARY OSTEOARTHRITIS OF RIGHT KNEE: Primary | ICD-10-CM

## 2024-04-12 PROCEDURE — 1159F MED LIST DOCD IN RCRD: CPT | Performed by: ORTHOPAEDIC SURGERY

## 2024-04-12 PROCEDURE — 99213 OFFICE O/P EST LOW 20 MIN: CPT | Performed by: ORTHOPAEDIC SURGERY

## 2024-04-12 PROCEDURE — 20610 DRAIN/INJ JOINT/BURSA W/O US: CPT | Performed by: ORTHOPAEDIC SURGERY

## 2024-04-12 RX ADMIN — BETAMETHASONE SODIUM PHOSPHATE AND BETAMETHASONE ACETATE 2 ML: 3; 3 INJECTION, SUSPENSION INTRA-ARTICULAR; INTRALESIONAL; INTRAMUSCULAR; SOFT TISSUE at 08:11

## 2024-04-12 RX ADMIN — LIDOCAINE HYDROCHLORIDE 4 ML: 10 INJECTION INFILTRATION; PERINEURAL at 08:11

## 2024-04-12 ASSESSMENT — PAIN SCALES - GENERAL: PAINLEVEL_OUTOF10: 5 - MODERATE PAIN

## 2024-04-12 ASSESSMENT — PAIN - FUNCTIONAL ASSESSMENT: PAIN_FUNCTIONAL_ASSESSMENT: 0-10

## 2024-04-12 NOTE — PROGRESS NOTES
History of present illness    74-year-old female I seen previously presents complaining of increasing right-sided knee pain she has pain along the medial side of the knee difficulty bearing weight no new injury she has had an ongoing diagnosis of arthritis in her knee she had excellent result from previous cortisone injection.      Past medical , Surgical, Family and social history reviewed.      Physical exam  General: No acute distress and breathing comfortably.  Patient is pleasant and cooperative with the examination.    Extremity  Right knee has crepitus with range of motion tenderness the medial joint space without instability brisk capillary fill compartments soft calf is nontender    Diagnostics      No results found.     Procedure  See dictated procedure note    Assessment  Right knee arthritis    Treatment plan  1.  The natural history of the condition and its associated treatment alternatives including surgical and nonsurgical options were discussed with the patient at length.  2.  Cortisone injection right knee performed today without complication.  Patient understands the cortisone may provide temporary relief how much it helps her how long it lasts is unpredictable.  Cortisone can be repeated after 3 months if symptoms return.  3.  Follow-up as needed  4.  All of the patient's questions were answered.    Orders Placed This Encounter    Inj/Asp: Right Knee       This note was prepared using voice recognition software.  The details of this note are correct and have been reviewed, and corrected to the best of my ability.  Some grammatical areas may persist related to the Dragon software    Shane De La Garza MD  Senior Attending Physician  [facility]  [facility] [place]    [phone]    Inj/Asp: Right Knee on 4/12/2024 8:11 AM  Indications: pain and diagnostic evaluation  Details: 22 G needle, anteromedial approach  Medications: 4 mL lidocaine 10 mg/mL (1 %); 2 mL  betamethasone acet,sod phos 6 mg/mL  Outcome: tolerated well, no immediate complications  Procedure, treatment alternatives, risks and benefits explained, specific risks discussed. Consent was given by the patient. Immediately prior to procedure a time out was called to verify the correct patient, procedure, equipment, support staff and site/side marked as required. Patient was prepped and draped in the usual sterile fashion.

## 2024-04-13 RX ORDER — LIDOCAINE HYDROCHLORIDE 10 MG/ML
4 INJECTION INFILTRATION; PERINEURAL
Status: COMPLETED | OUTPATIENT
Start: 2024-04-12 | End: 2024-04-12

## 2024-04-13 RX ORDER — BETAMETHASONE SODIUM PHOSPHATE AND BETAMETHASONE ACETATE 3; 3 MG/ML; MG/ML
2 INJECTION, SUSPENSION INTRA-ARTICULAR; INTRALESIONAL; INTRAMUSCULAR; SOFT TISSUE
Status: COMPLETED | OUTPATIENT
Start: 2024-04-12 | End: 2024-04-12

## 2024-04-16 ASSESSMENT — ENCOUNTER SYMPTOMS
DIARRHEA: 0
CONSTIPATION: 0
ANAL BLEEDING: 0
VOICE CHANGE: 0
GASTROINTESTINAL NEGATIVE: 1
EYES NEGATIVE: 1
BLURRED VISION: 0
RECTAL PAIN: 0
ABDOMINAL PAIN: 0
ALLERGIC/IMMUNOLOGIC NEGATIVE: 1
RESPIRATORY NEGATIVE: 1
TROUBLE SWALLOWING: 0
SHORTNESS OF BREATH: 0
COLOR CHANGE: 0
ORTHOPNEA: 0
BLOOD IN STOOL: 0

## 2024-04-16 NOTE — PROGRESS NOTES
Subjective  Ester Holley, 74 y.o. female presents today with:  Chief Complaint   Patient presents with    Weight Management     adipex        Adipex  BMI 29.6      Loss 9    Hypertension  This is a chronic problem. The problem is controlled. Pertinent negatives include no anxiety, blurred vision, chest pain, headaches, malaise/fatigue, neck pain, orthopnea, palpitations, peripheral edema, PND, shortness of breath or sweats. Risk factors for coronary artery disease include post-menopausal state, stress and sedentary lifestyle. Past treatments include angiotensin blockers.   Depression  Visit Type: follow-up  Patient presents with the following symptoms: depressed mood, insomnia, irritability and nervousness/anxiety.  Patient is not experiencing: palpitations and shortness of breath.  Frequency of symptoms: rarely   Severity: mild   Sleep quality: fair  Nighttime awakenings: occasional  Compliance with medications:  %    Pulm HTN- Chronic controlled, managed by Dr. Mercedes.      Review of Systems   Constitutional:  Positive for irritability. Negative for activity change, appetite change, fatigue, malaise/fatigue and unexpected weight change.   HENT: Negative.  Negative for dental problem, nosebleeds, trouble swallowing and voice change.    Eyes: Negative.  Negative for blurred vision and visual disturbance.   Respiratory: Negative.  Negative for shortness of breath.    Cardiovascular: Negative.  Negative for chest pain, palpitations, orthopnea, leg swelling and PND.   Gastrointestinal: Negative.  Negative for abdominal pain, anal bleeding, blood in stool, constipation, diarrhea and rectal pain.   Endocrine: Negative.  Negative for cold intolerance, heat intolerance, polydipsia, polyphagia and polyuria.   Genitourinary: Negative.    Musculoskeletal: Negative.  Negative for neck pain.   Skin: Negative.  Negative for color change and rash.   Allergic/Immunologic: Negative.    Neurological: Negative.

## 2024-05-10 ENCOUNTER — OFFICE VISIT (OUTPATIENT)
Dept: ORTHOPEDIC SURGERY | Facility: CLINIC | Age: 74
End: 2024-05-10
Payer: MEDICARE

## 2024-05-10 DIAGNOSIS — M17.0 PRIMARY OSTEOARTHRITIS OF BOTH KNEES: Primary | ICD-10-CM

## 2024-05-10 PROCEDURE — 20610 DRAIN/INJ JOINT/BURSA W/O US: CPT | Performed by: ORTHOPAEDIC SURGERY

## 2024-05-10 PROCEDURE — 1159F MED LIST DOCD IN RCRD: CPT | Performed by: ORTHOPAEDIC SURGERY

## 2024-05-10 PROCEDURE — 99214 OFFICE O/P EST MOD 30 MIN: CPT | Performed by: ORTHOPAEDIC SURGERY

## 2024-05-10 RX ADMIN — LIDOCAINE HYDROCHLORIDE 4 ML: 10 INJECTION INFILTRATION; PERINEURAL at 09:04

## 2024-05-10 RX ADMIN — BETAMETHASONE SODIUM PHOSPHATE AND BETAMETHASONE ACETATE 2 ML: 3; 3 INJECTION, SUSPENSION INTRA-ARTICULAR; INTRALESIONAL; INTRAMUSCULAR; SOFT TISSUE at 09:04

## 2024-05-10 NOTE — PROGRESS NOTES
History of present illness    74-year-old female here for both knees she states that the right knee is giving her more more pain she had previous gel injection in the right knee she had good result from previous gel injection but she did have a reaction to the Synvisc she would like to do a different gel if possible.  Her left knee is giving her more trouble she is a good result from cortisone in the left knee she is not a new injury she has difficulty bearing weight difficulty standing for prolonged period time.      Past medical , Surgical, Family and social history reviewed.      Physical exam  General: No acute distress and breathing comfortably.  Patient is pleasant and cooperative with the examination.    Extremity  Right and left knee are both examined today she is Crepitus with range of motion tenderness medial joint space without instability brisk cap refill compartments are soft calf is nontender    Diagnostics      No results found.     Procedure  See dictated procedure note    Assessment  Bilateral knee arthritis    Treatment plan  1.  The natural history of the condition and its associated treatment alternatives including surgical and nonsurgical options were discussed with the patient at length.  2.  Cortisone injection left knee performed today without complication.  Patient understands the cortisone may provide temporary relief how much it helps her how long it lasts is unpredictable.  Cortisone can be repeated after 3 months if symptoms return.  She like us to submit for viscosupplementation for her right knee.  In conclusion, this patient has osteoarthritis of the knee which is symptomatic.  This is causing significant morning stiffness lasting over an hour.  The patient has popping clicking and grinding in the knee with range of motion that is decreased and gets worse with prolonged standing or going up and down stairs.  This affects functional activities and activities of  daily living.  There is radiographic evidence of osteoarthritis with joint space narrowing and marginal osteophyte formation.  This patient has also failed nonpharmacologic treatment for osteoarthritis including attempts at weight loss, and a home exercise program with or without physical therapy.  The patient has also failed pharmacologic treatments for osteoarthritis including over-the-counter analgesics, anti-inflammatory medication as well as injectable treatments.  For these reasons I feel the patient is a candidate for Visco supplementation injections of the knee.  3. [   ]  4.  All of the patient's questions were answered.    Orders Placed This Encounter    Inj/Asp: Left knee       This note was prepared using voice recognition software.  The details of this note are correct and have been reviewed, and corrected to the best of my ability.  Some grammatical areas may persist related to the Dragon software    Shane De La Graza MD  Senior Attending Physician  [facility]  [facility] [place]    [phone]    Inj/Asp: Left knee on 5/10/2024 9:04 AM  Indications: pain and diagnostic evaluation  Details: 22 G needle, anteromedial approach  Medications: 2 mL betamethasone acet,sod phos 6 mg/mL; 4 mL lidocaine 10 mg/mL (1 %)  Outcome: tolerated well, no immediate complications  Procedure, treatment alternatives, risks and benefits explained, specific risks discussed. Consent was given by the patient. Immediately prior to procedure a time out was called to verify the correct patient, procedure, equipment, support staff and site/side marked as required. Patient was prepped and draped in the usual sterile fashion.

## 2024-05-12 RX ORDER — LIDOCAINE HYDROCHLORIDE 10 MG/ML
4 INJECTION INFILTRATION; PERINEURAL
Status: COMPLETED | OUTPATIENT
Start: 2024-05-10 | End: 2024-05-10

## 2024-05-12 RX ORDER — BETAMETHASONE SODIUM PHOSPHATE AND BETAMETHASONE ACETATE 3; 3 MG/ML; MG/ML
2 INJECTION, SUSPENSION INTRA-ARTICULAR; INTRALESIONAL; INTRAMUSCULAR; SOFT TISSUE
Status: COMPLETED | OUTPATIENT
Start: 2024-05-10 | End: 2024-05-10

## 2024-05-22 ENCOUNTER — APPOINTMENT (OUTPATIENT)
Dept: CARDIOLOGY | Facility: CLINIC | Age: 74
End: 2024-05-22
Payer: MEDICARE

## 2024-05-29 RX ORDER — BUSPIRONE HYDROCHLORIDE 10 MG/1
10 TABLET ORAL 2 TIMES DAILY
Qty: 60 TABLET | Refills: 3 | Status: SHIPPED | OUTPATIENT
Start: 2024-05-29

## 2024-05-29 NOTE — TELEPHONE ENCOUNTER
Pharmacy is requesting medication refill. Please approve or deny this request.    Rx requested:  Requested Prescriptions     Pending Prescriptions Disp Refills    busPIRone (BUSPAR) 10 MG tablet [Pharmacy Med Name: BUSPIRONE 10MG TABLETS] 60 tablet 3     Sig: TAKE 1 TABLET BY MOUTH TWICE DAILY         Last Office Visit:   10/23/2023      Next Visit Date:  Future Appointments   Date Time Provider Department Center   6/6/2024 10:00 AM Jennifer Salazar, APRN - CNP [facility]   8/7/2024  3:30 PM Shaun Vázquez MD [place] [facility] -

## 2024-06-06 ENCOUNTER — OFFICE VISIT (OUTPATIENT)
Dept: FAMILY MEDICINE CLINIC | Age: 74
End: 2024-06-06
Payer: MEDICARE

## 2024-06-06 VITALS
WEIGHT: 167 LBS | SYSTOLIC BLOOD PRESSURE: 138 MMHG | BODY MASS INDEX: 29.59 KG/M2 | DIASTOLIC BLOOD PRESSURE: 82 MMHG | HEIGHT: 63 IN

## 2024-06-06 DIAGNOSIS — R73.01 ELEVATED FASTING GLUCOSE: ICD-10-CM

## 2024-06-06 DIAGNOSIS — I10 PRIMARY HYPERTENSION: ICD-10-CM

## 2024-06-06 DIAGNOSIS — I10 ESSENTIAL (PRIMARY) HYPERTENSION: ICD-10-CM

## 2024-06-06 DIAGNOSIS — E66.9 CLASS 1 OBESITY WITH SERIOUS COMORBIDITY AND BODY MASS INDEX (BMI) OF 30.0 TO 30.9 IN ADULT, UNSPECIFIED OBESITY TYPE: ICD-10-CM

## 2024-06-06 DIAGNOSIS — I10 ESSENTIAL (PRIMARY) HYPERTENSION: Primary | ICD-10-CM

## 2024-06-06 DIAGNOSIS — E55.9 VITAMIN D DEFICIENCY: ICD-10-CM

## 2024-06-06 LAB
ALBUMIN SERPL-MCNC: 4.2 G/DL (ref 3.5–4.6)
ALP SERPL-CCNC: 95 U/L (ref 40–130)
ALT SERPL-CCNC: 14 U/L (ref 0–33)
ANION GAP SERPL CALCULATED.3IONS-SCNC: 12 MEQ/L (ref 9–15)
AST SERPL-CCNC: 19 U/L (ref 0–35)
BASOPHILS # BLD: 0.1 K/UL (ref 0–0.2)
BASOPHILS NFR BLD: 1.4 %
BILIRUB SERPL-MCNC: 0.5 MG/DL (ref 0.2–0.7)
BUN SERPL-MCNC: 12 MG/DL (ref 8–23)
CALCIUM SERPL-MCNC: 9.4 MG/DL (ref 8.5–9.9)
CHLORIDE SERPL-SCNC: 106 MEQ/L (ref 95–107)
CHOLEST SERPL-MCNC: 195 MG/DL (ref 0–199)
CO2 SERPL-SCNC: 23 MEQ/L (ref 20–31)
CREAT SERPL-MCNC: 0.7 MG/DL (ref 0.5–0.9)
EOSINOPHIL # BLD: 0.3 K/UL (ref 0–0.7)
EOSINOPHIL NFR BLD: 4.9 %
ERYTHROCYTE [DISTWIDTH] IN BLOOD BY AUTOMATED COUNT: 13.1 % (ref 11.5–14.5)
ESTIMATED AVERAGE GLUCOSE: 117 MG/DL
GLOBULIN SER CALC-MCNC: 2.5 G/DL (ref 2.3–3.5)
GLUCOSE SERPL-MCNC: 118 MG/DL (ref 70–99)
HBA1C MFR BLD: 5.7 % (ref 4–6)
HCT VFR BLD AUTO: 39.6 % (ref 37–47)
HDLC SERPL-MCNC: 72 MG/DL (ref 40–59)
HGB BLD-MCNC: 13.2 G/DL (ref 12–16)
LDLC SERPL CALC-MCNC: 108 MG/DL (ref 0–129)
LYMPHOCYTES # BLD: 1.8 K/UL (ref 1–4.8)
LYMPHOCYTES NFR BLD: 35.4 %
MCH RBC QN AUTO: 30.9 PG (ref 27–31.3)
MCHC RBC AUTO-ENTMCNC: 33.3 % (ref 33–37)
MCV RBC AUTO: 92.7 FL (ref 79.4–94.8)
MONOCYTES # BLD: 0.5 K/UL (ref 0.2–0.8)
MONOCYTES NFR BLD: 10.4 %
NEUTROPHILS # BLD: 2.4 K/UL (ref 1.4–6.5)
NEUTS SEG NFR BLD: 47.5 %
PLATELET # BLD AUTO: 264 K/UL (ref 130–400)
POTASSIUM SERPL-SCNC: 4.1 MEQ/L (ref 3.4–4.9)
PROT SERPL-MCNC: 6.7 G/DL (ref 6.3–8)
RBC # BLD AUTO: 4.27 M/UL (ref 4.2–5.4)
SODIUM SERPL-SCNC: 141 MEQ/L (ref 135–144)
TRIGL SERPL-MCNC: 75 MG/DL (ref 0–150)
TSH SERPL-MCNC: 2.72 UIU/ML (ref 0.44–3.86)
VITAMIN D 25-HYDROXY: 25.6 NG/ML (ref 30–100)
WBC # BLD AUTO: 5.1 K/UL (ref 4.8–10.8)

## 2024-06-06 PROCEDURE — 99214 OFFICE O/P EST MOD 30 MIN: CPT | Performed by: NURSE PRACTITIONER

## 2024-06-06 PROCEDURE — 1036F TOBACCO NON-USER: CPT | Performed by: NURSE PRACTITIONER

## 2024-06-06 PROCEDURE — G8427 DOCREV CUR MEDS BY ELIG CLIN: HCPCS | Performed by: NURSE PRACTITIONER

## 2024-06-06 PROCEDURE — 3075F SYST BP GE 130 - 139MM HG: CPT | Performed by: NURSE PRACTITIONER

## 2024-06-06 PROCEDURE — G8399 PT W/DXA RESULTS DOCUMENT: HCPCS | Performed by: NURSE PRACTITIONER

## 2024-06-06 PROCEDURE — 3017F COLORECTAL CA SCREEN DOC REV: CPT | Performed by: NURSE PRACTITIONER

## 2024-06-06 PROCEDURE — 1090F PRES/ABSN URINE INCON ASSESS: CPT | Performed by: NURSE PRACTITIONER

## 2024-06-06 PROCEDURE — 3079F DIAST BP 80-89 MM HG: CPT | Performed by: NURSE PRACTITIONER

## 2024-06-06 PROCEDURE — 1123F ACP DISCUSS/DSCN MKR DOCD: CPT | Performed by: NURSE PRACTITIONER

## 2024-06-06 PROCEDURE — G8417 CALC BMI ABV UP PARAM F/U: HCPCS | Performed by: NURSE PRACTITIONER

## 2024-06-06 RX ORDER — METOPROLOL SUCCINATE 25 MG/1
12.5 TABLET, EXTENDED RELEASE ORAL DAILY
Qty: 15 TABLET | Refills: 5
Start: 2024-06-06

## 2024-06-06 RX ORDER — TOPIRAMATE 50 MG/1
50 TABLET, FILM COATED ORAL 2 TIMES DAILY
Qty: 60 TABLET | Refills: 5 | Status: SHIPPED | OUTPATIENT
Start: 2024-06-06 | End: 2024-06-13

## 2024-06-06 RX ORDER — PHENTERMINE HYDROCHLORIDE 37.5 MG/1
37.5 TABLET ORAL
Qty: 30 TABLET | Refills: 0 | Status: SHIPPED | OUTPATIENT
Start: 2024-06-06 | End: 2024-07-06

## 2024-06-07 ENCOUNTER — OFFICE VISIT (OUTPATIENT)
Dept: ORTHOPEDIC SURGERY | Facility: CLINIC | Age: 74
End: 2024-06-07
Payer: MEDICARE

## 2024-06-07 DIAGNOSIS — M17.11 PRIMARY OSTEOARTHRITIS OF RIGHT KNEE: Primary | ICD-10-CM

## 2024-06-07 PROCEDURE — 1036F TOBACCO NON-USER: CPT | Performed by: ORTHOPAEDIC SURGERY

## 2024-06-07 PROCEDURE — 20610 DRAIN/INJ JOINT/BURSA W/O US: CPT | Performed by: ORTHOPAEDIC SURGERY

## 2024-06-07 PROCEDURE — 1159F MED LIST DOCD IN RCRD: CPT | Performed by: ORTHOPAEDIC SURGERY

## 2024-06-07 NOTE — PROGRESS NOTES
Subjective    Patient ID: Francie    Chief Complaint   Patient presents with    Right Knee - Injections     Gelsyn #1       Inj/Asp: Right knee on 6/7/2024 9:57 AM  Indications: pain  Details: 22 G needle, anteromedial approach  Medications: 2 mL sodium hyaluronate 16.8 mg/2 mL  Outcome: tolerated well, no immediate complications  Procedure, treatment alternatives, risks and benefits explained, specific risks discussed. Consent was given by the patient. Immediately prior to procedure a time out was called to verify the correct patient, procedure, equipment, support staff and site/side marked as required. Patient was prepped and draped in the usual sterile fashion.

## 2024-06-11 ASSESSMENT — ENCOUNTER SYMPTOMS
RECTAL PAIN: 0
ORTHOPNEA: 0
ABDOMINAL PAIN: 0
BLURRED VISION: 0
EYES NEGATIVE: 1
BLOOD IN STOOL: 0
TROUBLE SWALLOWING: 0
CONSTIPATION: 0
SHORTNESS OF BREATH: 0
ANAL BLEEDING: 0
RESPIRATORY NEGATIVE: 1
GASTROINTESTINAL NEGATIVE: 1
VOICE CHANGE: 0
COLOR CHANGE: 0
DIARRHEA: 0
ALLERGIC/IMMUNOLOGIC NEGATIVE: 1

## 2024-06-12 DIAGNOSIS — I10 PRIMARY HYPERTENSION: ICD-10-CM

## 2024-06-12 DIAGNOSIS — R00.2 PALPITATIONS: ICD-10-CM

## 2024-06-12 RX ORDER — METOPROLOL SUCCINATE 25 MG/1
12.5 TABLET, EXTENDED RELEASE ORAL DAILY
Qty: 15 TABLET | Refills: 1 | Status: SHIPPED | OUTPATIENT
Start: 2024-06-12 | End: 2024-06-13

## 2024-06-12 NOTE — TELEPHONE ENCOUNTER
Called and spoke with patient.  Advised that she is taking Metoprolol Succ 25mg 1/2 tablet daily at bedtime.

## 2024-06-12 NOTE — TELEPHONE ENCOUNTER
Received call from patient requesting refills for metoprolol to be sent to [facility]. Patient has pending appt 7/31/24 with Dr. Rdz. Medication reordered and routed to [facility] in absence of Dr. Rdz for authorization.

## 2024-06-12 NOTE — PROGRESS NOTES
Subjective  Ester Holley, 74 y.o. female presents today with:  Chief Complaint   Patient presents with   • Weight Management     adipex        Adipex  BMI 29.6      Loss 9    Hypertension  This is a chronic problem. The problem is controlled. Pertinent negatives include no anxiety, blurred vision, chest pain, headaches, malaise/fatigue, neck pain, orthopnea, palpitations, peripheral edema, PND, shortness of breath or sweats. Risk factors for coronary artery disease include post-menopausal state, stress and sedentary lifestyle. Past treatments include angiotensin blockers.   Depression  Visit Type: follow-up  Patient presents with the following symptoms: depressed mood, insomnia, irritability and nervousness/anxiety.  Patient is not experiencing: palpitations and shortness of breath.  Frequency of symptoms: rarely   Severity: mild   Sleep quality: fair  Nighttime awakenings: occasional  Compliance with medications:  %    Weight Management  Pertinent negatives include no abdominal pain, chest pain, fatigue, headaches, neck pain, rash or weakness.   Pulm HTN- Chronic controlled, managed by Dr. Merecdes.      Review of Systems   Constitutional:  Positive for irritability. Negative for activity change, appetite change, fatigue, malaise/fatigue and unexpected weight change.   HENT: Negative.  Negative for dental problem, nosebleeds, trouble swallowing and voice change.    Eyes: Negative.  Negative for blurred vision and visual disturbance.   Respiratory: Negative.  Negative for shortness of breath.    Cardiovascular: Negative.  Negative for chest pain, palpitations, orthopnea, leg swelling and PND.   Gastrointestinal: Negative.  Negative for abdominal pain, anal bleeding, blood in stool, constipation, diarrhea and rectal pain.   Endocrine: Negative.  Negative for cold intolerance, heat intolerance, polydipsia, polyphagia and polyuria.   Genitourinary: Negative.    Musculoskeletal: Negative.  Negative for

## 2024-06-13 RX ORDER — TOPIRAMATE 50 MG/1
50 TABLET, FILM COATED ORAL 2 TIMES DAILY
Qty: 180 TABLET | Refills: 1 | Status: SHIPPED | OUTPATIENT
Start: 2024-06-13

## 2024-06-14 ENCOUNTER — APPOINTMENT (OUTPATIENT)
Dept: ORTHOPEDIC SURGERY | Facility: CLINIC | Age: 74
End: 2024-06-14
Payer: MEDICARE

## 2024-06-14 DIAGNOSIS — M17.11 PRIMARY OSTEOARTHRITIS OF RIGHT KNEE: Primary | ICD-10-CM

## 2024-06-14 NOTE — PROGRESS NOTES
Subjective    Patient ID: Francie    Chief Complaint   Patient presents with    Right Knee - Injections     Gelsyn #2       Inj/Asp: Right knee on 6/14/2024 7:58 AM  Indications: pain  Details: 22 G needle, anteromedial approach  Medications: 2 mL sodium hyaluronate 16.8 mg/2 mL  Outcome: tolerated well, no immediate complications  Procedure, treatment alternatives, risks and benefits explained, specific risks discussed. Consent was given by the patient. Immediately prior to procedure a time out was called to verify the correct patient, procedure, equipment, support staff and site/side marked as required. Patient was prepped and draped in the usual sterile fashion.

## 2024-06-18 ENCOUNTER — APPOINTMENT (OUTPATIENT)
Dept: CARDIOLOGY | Facility: CLINIC | Age: 74
End: 2024-06-18
Payer: MEDICARE

## 2024-06-19 ENCOUNTER — TELEPHONE (OUTPATIENT)
Dept: ORTHOPEDIC SURGERY | Facility: CLINIC | Age: 74
End: 2024-06-19
Payer: MEDICARE

## 2024-06-19 DIAGNOSIS — M17.12 PRIMARY OSTEOARTHRITIS OF LEFT KNEE: ICD-10-CM

## 2024-06-19 RX ORDER — METHYLPREDNISOLONE 4 MG/1
TABLET ORAL
Qty: 21 TABLET | Refills: 0 | Status: SHIPPED | OUTPATIENT
Start: 2024-06-19

## 2024-06-19 NOTE — TELEPHONE ENCOUNTER
Pt called and said she had her second synvisc injection last Thursday and she is starting to have a reaction , itching , red swelling at the injection site, she wanted to know if Dr De La Garza could call in prednisone before it gets to bad . States she had a reaction to the synvics on the other knee., So she doesn't think she should come in for the last injection, she would like a return call to discuss. Pharmacy is correct

## 2024-06-19 NOTE — TELEPHONE ENCOUNTER
Called and spoke with patient after speaking with Dr. De La Garza. We will send in prednisone for patient to take to see if it helps with the reaction she is having with the Gelsyn injection we also will refrain from all gel injections at this time since there have been 2 separate reactions to 2 different brands of gel injections.

## 2024-06-20 ENCOUNTER — APPOINTMENT (OUTPATIENT)
Dept: CARDIOLOGY | Facility: CLINIC | Age: 74
End: 2024-06-20
Payer: MEDICARE

## 2024-06-21 ENCOUNTER — APPOINTMENT (OUTPATIENT)
Dept: ORTHOPEDIC SURGERY | Facility: CLINIC | Age: 74
End: 2024-06-21
Payer: MEDICARE

## 2024-07-31 ENCOUNTER — APPOINTMENT (OUTPATIENT)
Dept: CARDIOLOGY | Facility: CLINIC | Age: 74
End: 2024-07-31
Payer: MEDICARE

## 2024-07-31 VITALS
SYSTOLIC BLOOD PRESSURE: 132 MMHG | DIASTOLIC BLOOD PRESSURE: 72 MMHG | HEIGHT: 63 IN | HEART RATE: 63 BPM | WEIGHT: 169.6 LBS | BODY MASS INDEX: 30.05 KG/M2

## 2024-07-31 DIAGNOSIS — E66.9 CLASS 1 OBESITY WITH SERIOUS COMORBIDITY AND BODY MASS INDEX (BMI) OF 30.0 TO 30.9 IN ADULT, UNSPECIFIED OBESITY TYPE: Primary | ICD-10-CM

## 2024-07-31 DIAGNOSIS — I47.19 PAT (PAROXYSMAL ATRIAL TACHYCARDIA) (CMS-HCC): ICD-10-CM

## 2024-07-31 DIAGNOSIS — Z79.899 MEDICATION COURSE CHANGED: ICD-10-CM

## 2024-07-31 DIAGNOSIS — R94.09 ABNORMAL TILT TABLE TEST: ICD-10-CM

## 2024-07-31 DIAGNOSIS — R73.03 PREDIABETES: ICD-10-CM

## 2024-07-31 DIAGNOSIS — R00.2 PALPITATIONS: ICD-10-CM

## 2024-07-31 DIAGNOSIS — I10 PRIMARY HYPERTENSION: ICD-10-CM

## 2024-07-31 PROCEDURE — 99214 OFFICE O/P EST MOD 30 MIN: CPT | Performed by: INTERNAL MEDICINE

## 2024-07-31 PROCEDURE — 3008F BODY MASS INDEX DOCD: CPT | Performed by: INTERNAL MEDICINE

## 2024-07-31 PROCEDURE — 3075F SYST BP GE 130 - 139MM HG: CPT | Performed by: INTERNAL MEDICINE

## 2024-07-31 PROCEDURE — 1159F MED LIST DOCD IN RCRD: CPT | Performed by: INTERNAL MEDICINE

## 2024-07-31 PROCEDURE — 3078F DIAST BP <80 MM HG: CPT | Performed by: INTERNAL MEDICINE

## 2024-07-31 PROCEDURE — 1160F RVW MEDS BY RX/DR IN RCRD: CPT | Performed by: INTERNAL MEDICINE

## 2024-07-31 RX ORDER — CALCIUM CARBONATE 300MG(750)
400 TABLET,CHEWABLE ORAL 2 TIMES DAILY
Qty: 180 TABLET | Refills: 2 | Status: SHIPPED | OUTPATIENT
Start: 2024-07-31

## 2024-07-31 RX ORDER — BUSPIRONE HYDROCHLORIDE 10 MG/1
1 TABLET ORAL 2 TIMES DAILY
COMMUNITY
Start: 2024-05-29

## 2024-07-31 RX ORDER — METOPROLOL SUCCINATE 25 MG/1
12.5 TABLET, EXTENDED RELEASE ORAL DAILY
Qty: 45 TABLET | Refills: 3 | Status: SHIPPED | OUTPATIENT
Start: 2024-07-31 | End: 2025-07-31

## 2024-07-31 RX ORDER — LOSARTAN POTASSIUM 50 MG/1
50 TABLET ORAL DAILY
Qty: 90 TABLET | Refills: 2 | Status: SHIPPED | OUTPATIENT
Start: 2024-07-31 | End: 2025-04-27

## 2024-07-31 NOTE — PATIENT INSTRUCTIONS
Continue same medications and treatments.     Please bring any lab results from other providers / physicians to your next appointment.     Please bring all medicines, vitamins, and herbal supplements with you when you come to the office.     Prescriptions will not be filled unless you are compliant with your follow up appointments or have a follow up appointment scheduled as per instruction of your physician. Refills should be requested at the time of your visit.    Scribe Attestation  By signing my name below, Parker LR Scribe   attest that this documentation has been prepared under the direction and in the presence of Shanti Rdz MD.

## 2024-07-31 NOTE — PROGRESS NOTES
6-month follow-up visit.    Subjective :   Interval review of systems is negative for chest discomfort pressure tightness heaviness palpitations lightheadedness orthopnea paroxysmal nocturnal dyspnea dependent edema or claudication TIA or CVA type symptoms or bleeding diathesis        History so Far :  1.  Palpitations  2.  Exertional shortness of breath  3.  Pulmonary hypertension  4.  Prediabetes, hemoglobin A1c 6.0 May 2023  5.  Coronary calcium score less than 100 October 2023, left main 0, LAD 41 left circumflex 0 RCA 0 ascending thoracic aorta 3.5 cm heavy mitral annular calcification  6.  Lexiscan Myoview November 2023-normal perfusion normal transient ischemic dilatation abnormal EKG with inferolateral ST-T abnormality LVEF 84%  7.  Holter PACs PVCs short bursts of paroxysmal atrial tachycardia, improved considerably on magnesium and low-dose beta-blockers  8.  History of syncope tilt table test in June 2013, orthostatic blood pressure response to tilt table testing.  10 minutes after head up tilt, symptomatic with lightheadedness, blood pressure dropped from 1 35-1 17 systolic, heart rate did not change much, patient then received sublingual nitroglycerin and became dizzy and presyncopal blood pressure dropped to 81/58 heart rate went from 88 to 89 bpm.  To me these findings suggest dysautonomia, because the heart rate did not increase appropriately.  9.  Echocardiogram November 2023-LVEF 65% RV systolic pressure 42 mmHg 1+ mitral regurgitation mild to moderate aortic stenosis peak and mean gradients 25 and 15 respectively aortic valve area 1.4 cm² which probably overestimates the severity slightly no aortic regurgitation LV end-systolic diameter 2.39 cm aortic root 2.5 cm left atrium 3 cm aortic valve dimensionless index 0.53  10.24-hour Holter October 2023: Sinus rhythm average rate 79 bpm palpitations corresponded to normal sinus rhythm supraventricular premature beats over 10/h 3 episodes of SVT atrial  "tachycardia 1 episode 2 seconds long no heart block   Objective   Wt Readings from Last 3 Encounters:   07/31/24 76.9 kg (169 lb 9.6 oz)   11/22/23 77.1 kg (170 lb)   10/25/23 78.9 kg (174 lb)            Vitals:    07/31/24 1611   BP: 132/72   BP Location: Right arm   Patient Position: Sitting   Pulse: 63   Weight: 76.9 kg (169 lb 9.6 oz)   Height: 1.6 m (5' 3\")                Physical Exam:    GENERAL APPEARANCE: in no acute distress.  CHEST: Symmetric and non-tender.  INTEGUMENT: Skin warm and dry  HEENT: No gross abnormalities identified.No pallor or scleral icterus.  NECK: Supple, no JVD, no bruit.   NEURO/PSHCY: Alert and oriented x3; appropriate behavior and responses and responses  LUNGS: Clear to auscultation bilaterally; normal respiratory effort.  HEART: Rate and rhythm regular with no evident murmur; no gallop appreciated.   ABDOMEN: Soft, non tender.  MUSCULOSKELETAL: No gross deformities.  EXTREMITIES: Warm  There is no edema noted.    Meds:  Current Outpatient Medications   Medication Instructions    busPIRone (Buspar) 10 mg tablet 1 tablet, oral, 2 times daily    CHOLECALCIFEROL, VITAMIN D3, ORAL oral, Daily PRN    ibuprofen 800 mg, oral, Daily PRN, PRN    losartan (COZAAR) 50 mg, oral, Daily    magnesium oxide (MAG-OX) 400 mg, oral, 2 times daily    metoprolol succinate XL (TOPROL-XL) 12.5 mg, oral, Daily    PARoxetine (PAXIL) 10 mg, oral, Every morning          Allergies   Allergen Reactions    Penicillins Unknown             LABS:    Lab Results   Component Value Date    HGBA1C 5.7 06/06/2024                 Reviewed lipid profile TSH CBC comprehensive profile hemoglobin A1c June 2024.  LDL cholesterol 108 HDL 72    Patient Active Problem List    Diagnosis Date Noted    Encounter to discuss test results 11/22/2023    Pulmonary hypertension (Multi) 11/22/2023    Primary hypertension 11/22/2023    Medication course changed 11/22/2023    Shortness of breath 10/25/2023    Palpitations 10/25/2023    " Syncope and collapse 10/25/2023    Agatston coronary artery calcium score less than 100 10/25/2023    Concussion 10/23/2023    Degenerative arthritis of cervical spine 10/23/2023    Internal derangement of right knee 10/23/2023    Knee osteoarthritis 10/23/2023    Vestibular dysfunction 10/23/2023                 Assessment:    1. Palpitations  Follow Up In Cardiology    Follow Up In Cardiology    magnesium oxide (Mag-Ox) 400 mg tablet    metoprolol succinate XL (Toprol-XL) 25 mg 24 hr tablet    Comprehensive metabolic panel    Lipid panel      2. Primary hypertension  Follow Up In Cardiology    Follow Up In Cardiology    losartan (Cozaar) 50 mg tablet    metoprolol succinate XL (Toprol-XL) 25 mg 24 hr tablet    Comprehensive metabolic panel    Lipid panel      3. Medication course changed  Follow Up In Cardiology    Follow Up In Cardiology    Comprehensive metabolic panel    Lipid panel      4. Abnormal tilt table test        5. Prediabetes        6. PAT (paroxysmal atrial tachycardia) (CMS-[facility])           Patient is doing well on current regimen.  For her prediabetes, she will follow-up with primary care.  Laboratory data reviewed  Follow up : 1 year        Provider Attestation - Scribe documentation    All medical record entries made by the Scribe were at my direction and personally dictated by me. I have reviewed the chart and agree that the record accurately reflects my personal performance of the history, physical exam, discussion and plan.

## 2024-08-01 RX ORDER — PHENTERMINE HYDROCHLORIDE 37.5 MG/1
37.5 TABLET ORAL
Qty: 30 TABLET | Refills: 0 | Status: SHIPPED | OUTPATIENT
Start: 2024-08-01 | End: 2024-08-31

## 2024-08-07 ENCOUNTER — OFFICE VISIT (OUTPATIENT)
Dept: NEUROLOGY | Age: 74
End: 2024-08-07
Payer: MEDICARE

## 2024-08-07 VITALS
BODY MASS INDEX: 29.71 KG/M2 | WEIGHT: 167.7 LBS | HEART RATE: 81 BPM | DIASTOLIC BLOOD PRESSURE: 70 MMHG | SYSTOLIC BLOOD PRESSURE: 116 MMHG

## 2024-08-07 DIAGNOSIS — G44.311 INTRACTABLE ACUTE POST-TRAUMATIC HEADACHE: ICD-10-CM

## 2024-08-07 DIAGNOSIS — H81.93 VESTIBULAR DYSFUNCTION OF BOTH EARS: ICD-10-CM

## 2024-08-07 DIAGNOSIS — R27.0 ATAXIA: ICD-10-CM

## 2024-08-07 DIAGNOSIS — S06.0X0D CONCUSSION WITHOUT LOSS OF CONSCIOUSNESS, SUBSEQUENT ENCOUNTER: Primary | ICD-10-CM

## 2024-08-07 PROCEDURE — 3074F SYST BP LT 130 MM HG: CPT | Performed by: PSYCHIATRY & NEUROLOGY

## 2024-08-07 PROCEDURE — 99214 OFFICE O/P EST MOD 30 MIN: CPT | Performed by: PSYCHIATRY & NEUROLOGY

## 2024-08-07 PROCEDURE — G8417 CALC BMI ABV UP PARAM F/U: HCPCS | Performed by: PSYCHIATRY & NEUROLOGY

## 2024-08-07 PROCEDURE — 1036F TOBACCO NON-USER: CPT | Performed by: PSYCHIATRY & NEUROLOGY

## 2024-08-07 PROCEDURE — 3078F DIAST BP <80 MM HG: CPT | Performed by: PSYCHIATRY & NEUROLOGY

## 2024-08-07 PROCEDURE — 1090F PRES/ABSN URINE INCON ASSESS: CPT | Performed by: PSYCHIATRY & NEUROLOGY

## 2024-08-07 PROCEDURE — 1123F ACP DISCUSS/DSCN MKR DOCD: CPT | Performed by: PSYCHIATRY & NEUROLOGY

## 2024-08-07 PROCEDURE — 3017F COLORECTAL CA SCREEN DOC REV: CPT | Performed by: PSYCHIATRY & NEUROLOGY

## 2024-08-07 PROCEDURE — G8399 PT W/DXA RESULTS DOCUMENT: HCPCS | Performed by: PSYCHIATRY & NEUROLOGY

## 2024-08-07 PROCEDURE — G8427 DOCREV CUR MEDS BY ELIG CLIN: HCPCS | Performed by: PSYCHIATRY & NEUROLOGY

## 2024-08-07 ASSESSMENT — ENCOUNTER SYMPTOMS
SHORTNESS OF BREATH: 0
PHOTOPHOBIA: 0
VOMITING: 0
BACK PAIN: 0
CHOKING: 0
NAUSEA: 0
TROUBLE SWALLOWING: 0
COLOR CHANGE: 0

## 2024-08-07 NOTE — PROGRESS NOTES
Physical Activity: Insufficiently Active (2/6/2024)    Exercise Vital Sign     Days of Exercise per Week: 3 days     Minutes of Exercise per Session: 40 min   Stress: Not on file   Social Connections: Not on file   Intimate Partner Violence: Not on file   Housing Stability: Unknown (4/8/2024)    Housing Stability Vital Sign     Unable to Pay for Housing in the Last Year: Not on file     Number of Places Lived in the Last Year: Not on file     Unstable Housing in the Last Year: No     No family history on file.  Allergies   Allergen Reactions    Lidocaine Angioedema    Penicillins Hives       Current Outpatient Medications   Medication Sig Dispense Refill    topiramate (TOPAMAX) 50 MG tablet TAKE 1 TABLET BY MOUTH TWICE DAILY 180 tablet 1    metoprolol succinate (TOPROL XL) 25 MG extended release tablet Take 0.5 tablets by mouth daily From [facility] for PALP 15 tablet 5    busPIRone (BUSPAR) 10 MG tablet TAKE 1 TABLET BY MOUTH TWICE DAILY 60 tablet 3    fluticasone (FLONASE) 50 MCG/ACT nasal spray 2 sprays by Each Nostril route daily 16 g 0    losartan (COZAAR) 50 MG tablet Take 1 tablet by mouth daily      Cholecalciferol (VITAMIN D3) 50 MCG (2000 UT) TABS TAKE 1 TABLET BY MOUTH DAILY 30 tablet 5    ibuprofen (ADVIL;MOTRIN) 800 MG tablet TAKE 1 TABLET BY MOUTH THREE TIMES DAILY AFTER MEALS      lidocaine-prilocaine (EMLA) 2.5-2.5 % cream Apply topically of the neck when needed 100 g 3    magnesium oxide (MAG-OX) 400 (240 Mg) MG tablet Take 1 tablet by mouth daily 30 tablet 2    phentermine (ADIPEX-P) 37.5 MG tablet Take 1 tablet by mouth every morning (before breakfast) for 30 days. BMI 30 Max Daily Amount: 37.5 mg (Patient not taking: Reported on 8/7/2024) 30 tablet 0     Current Facility-Administered Medications   Medication Dose Route Frequency Provider Last Rate Last Admin    cyanocobalamin injection 1,000 mcg  1,000 mcg IntraMUSCular Q30 Days Jennifer Salazar APRN - CNP   1,000 mcg at 08/07/23 0931

## 2024-09-12 ENCOUNTER — OFFICE VISIT (OUTPATIENT)
Dept: FAMILY MEDICINE CLINIC | Age: 74
End: 2024-09-12

## 2024-09-12 VITALS
DIASTOLIC BLOOD PRESSURE: 88 MMHG | SYSTOLIC BLOOD PRESSURE: 140 MMHG | WEIGHT: 165 LBS | HEIGHT: 63 IN | BODY MASS INDEX: 29.23 KG/M2

## 2024-09-12 DIAGNOSIS — F33.0 MAJOR DEPRESSIVE DISORDER, RECURRENT, MILD (HCC): ICD-10-CM

## 2024-09-12 DIAGNOSIS — E55.9 VITAMIN D DEFICIENCY: ICD-10-CM

## 2024-09-12 DIAGNOSIS — E66.9 CLASS 1 OBESITY WITH SERIOUS COMORBIDITY AND BODY MASS INDEX (BMI) OF 30.0 TO 30.9 IN ADULT, UNSPECIFIED OBESITY TYPE: Primary | ICD-10-CM

## 2024-09-12 DIAGNOSIS — I10 PRIMARY HYPERTENSION: ICD-10-CM

## 2024-09-12 DIAGNOSIS — I27.20 PULMONARY HYPERTENSION (HCC): ICD-10-CM

## 2024-09-12 RX ORDER — PHENTERMINE HYDROCHLORIDE 37.5 MG/1
37.5 TABLET ORAL
Qty: 30 TABLET | Refills: 2 | Status: SHIPPED | OUTPATIENT
Start: 2024-09-12 | End: 2024-10-12

## 2024-09-12 ASSESSMENT — ENCOUNTER SYMPTOMS
ANAL BLEEDING: 0
ABDOMINAL PAIN: 0
TROUBLE SWALLOWING: 0
BLOOD IN STOOL: 0
EYES NEGATIVE: 1
GASTROINTESTINAL NEGATIVE: 1
CONSTIPATION: 0
BLURRED VISION: 0
RECTAL PAIN: 0
VOICE CHANGE: 0
COLOR CHANGE: 0
ORTHOPNEA: 0
DIARRHEA: 0
ALLERGIC/IMMUNOLOGIC NEGATIVE: 1
RESPIRATORY NEGATIVE: 1
SHORTNESS OF BREATH: 0

## 2024-09-20 DIAGNOSIS — H81.91 VESTIBULOPATHY OF RIGHT EAR: Primary | ICD-10-CM

## 2024-09-26 RX ORDER — BUSPIRONE HYDROCHLORIDE 10 MG/1
10 TABLET ORAL 2 TIMES DAILY
Qty: 60 TABLET | Refills: 3 | OUTPATIENT
Start: 2024-09-26

## 2024-10-30 ENCOUNTER — APPOINTMENT (OUTPATIENT)
Dept: ORTHOPEDIC SURGERY | Facility: CLINIC | Age: 74
End: 2024-10-30
Payer: MEDICARE

## 2024-10-30 DIAGNOSIS — M17.11 PRIMARY OSTEOARTHRITIS OF RIGHT KNEE: Primary | ICD-10-CM

## 2024-10-30 PROCEDURE — 1160F RVW MEDS BY RX/DR IN RCRD: CPT | Performed by: ORTHOPAEDIC SURGERY

## 2024-10-30 PROCEDURE — 20610 DRAIN/INJ JOINT/BURSA W/O US: CPT | Performed by: ORTHOPAEDIC SURGERY

## 2024-10-30 PROCEDURE — 99214 OFFICE O/P EST MOD 30 MIN: CPT | Performed by: ORTHOPAEDIC SURGERY

## 2024-10-30 PROCEDURE — 1159F MED LIST DOCD IN RCRD: CPT | Performed by: ORTHOPAEDIC SURGERY

## 2024-10-30 PROCEDURE — 1036F TOBACCO NON-USER: CPT | Performed by: ORTHOPAEDIC SURGERY

## 2024-10-30 RX ORDER — BETAMETHASONE SODIUM PHOSPHATE AND BETAMETHASONE ACETATE 3; 3 MG/ML; MG/ML
2 INJECTION, SUSPENSION INTRA-ARTICULAR; INTRALESIONAL; INTRAMUSCULAR; SOFT TISSUE
Status: COMPLETED | OUTPATIENT
Start: 2024-10-30 | End: 2024-10-30

## 2024-10-30 RX ORDER — LIDOCAINE HYDROCHLORIDE 10 MG/ML
4 INJECTION, SOLUTION INFILTRATION; PERINEURAL
Status: COMPLETED | OUTPATIENT
Start: 2024-10-30 | End: 2024-10-30

## 2024-11-05 ENCOUNTER — APPOINTMENT (OUTPATIENT)
Dept: OTOLARYNGOLOGY | Facility: CLINIC | Age: 74
End: 2024-11-05
Payer: MEDICARE

## 2024-11-05 VITALS — BODY MASS INDEX: 29.23 KG/M2 | WEIGHT: 165 LBS

## 2024-11-05 DIAGNOSIS — J30.1 SEASONAL ALLERGIC RHINITIS DUE TO POLLEN: ICD-10-CM

## 2024-11-05 DIAGNOSIS — J01.91 ACUTE RECURRENT SINUSITIS, UNSPECIFIED LOCATION: Primary | ICD-10-CM

## 2024-11-05 DIAGNOSIS — J34.89 NASAL SEPTAL PERFORATION: ICD-10-CM

## 2024-11-05 PROCEDURE — 99203 OFFICE O/P NEW LOW 30 MIN: CPT | Performed by: OTOLARYNGOLOGY

## 2024-11-05 ASSESSMENT — ENCOUNTER SYMPTOMS
CARDIOVASCULAR NEGATIVE: 1
CONSTITUTIONAL NEGATIVE: 1
NEUROLOGICAL NEGATIVE: 1
RESPIRATORY NEGATIVE: 1

## 2024-11-05 NOTE — PROGRESS NOTES
Subjective   Patient ID: Francie Adames is a 74 y.o. female who presents for Deviated Nasal Septum and Sinusitis.    HPI  This patient presents is a 74-year-old female with a history of previous nasal surgery by a now retired local ENT surgeon with septoplasty etc. and end resulting septal perforation.  She continues to note evidence of congestion and does note significant severe allergies with flares in particular in the spring and fall.  Previous use of sprays etc. have not always been efficacious.  She does show evidence of recurrent rhinosinusitis superimposed.  All remaining ENT inquiry is otherwise grossly clear.    Review of Systems   Constitutional: Negative.    HENT: Negative.     Respiratory: Negative.     Cardiovascular: Negative.    Neurological: Negative.        Physical Exam    General appearance: No acute distress. Normal facies. Symmetric facial movement. No gross lesions of the face are noted.  The external ear structures appear normal. The ear canals patent and the tympanic membranes are intact without evidence of air-fluid levels, retraction, or congenital defects.  The tongue is normally mobile. There are no lesions on the gingiva, buccal, or oral mucosa. There are no oral cavity masses.  The neck is negative for mass lymphadenopathy. The trachea and parotid are clear. The thyroid bed is grossly unremarkable. The salivary gland structures are grossly unremarkable.  Nasal exam noted for anterior septal perforation.  Overall the nasal airway is not bad.  She may even have a component of atrophic rhinitis.    Assessment/Plan     74-year-old female with history of chronic nasal obstruction and congestion with severe allergies and septal perforation as described.  We will have her utilize Nasacort appropriately and then have CT scan performed.  If evident looks fairly good on the scan I will likely have her see our dedicated colleagues in allergy for further management  All questions were answered in  this regard accordingly.

## 2024-11-13 ENCOUNTER — ANCILLARY PROCEDURE (OUTPATIENT)
Facility: HOSPITAL | Age: 74
End: 2024-11-13
Payer: MEDICARE

## 2024-11-13 PROCEDURE — 70486 CT MAXILLOFACIAL W/O DYE: CPT

## 2024-12-04 ENCOUNTER — APPOINTMENT (OUTPATIENT)
Dept: OTOLARYNGOLOGY | Facility: CLINIC | Age: 74
End: 2024-12-04
Payer: MEDICARE

## 2024-12-04 DIAGNOSIS — J34.89 NASAL SEPTAL PERFORATION: ICD-10-CM

## 2024-12-04 DIAGNOSIS — J34.8210 NASAL ALAR COLLAPSE: Primary | ICD-10-CM

## 2024-12-04 PROCEDURE — 99213 OFFICE O/P EST LOW 20 MIN: CPT | Performed by: OTOLARYNGOLOGY

## 2024-12-04 NOTE — PROGRESS NOTES
Patient returns.  We are seeing her back today for follow-up check status post CT scanning for chronic congestion with septal perforation.  She had previous surgery by outside now retired ENT.  Has had prominent septal perforation noted.  Has had evidence of probable alar collapse.  CT scan was ordered to make sure nothing is being overlooked in particular polyps etc. as etiology or contributing factors.  Thankfully the CT scan does not show any otherwise more worrisome.  This has been reviewed by me personally.  No evidence of any worrisome chronic infection.    Exam:  No acute distress.  The external ear structures appear normal. The ear canals patent and the tympanic membranes are intact without evidence of air-fluid levels, retraction, or congenital defects.  Anterior rhinoscopy notes stable prominent perforation of the anterior nasal septum.  Additionally, shows evidence of alar collapse right greater than left.  Examination is noted for normal healthy mucosal membranes without any evidence of lesions, polyps, or exudate. The tongue is normally mobile. There are no lesions on the gingiva, buccal, or oral mucosa. There are no oral cavity masses.  The neck is negative for mass lymphadenopathy. The trachea and parotid are clear. The thyroid bed is grossly unremarkable. The salivary gland structures are grossly unremarkable.    Assessment and plan:  Chronic nasal obstruction/congestion with clear evidence of nasal septal perforation superimposed with alar collapse.  I am going to recommend she see our dedicated plastic/ENT colleague Dr. Jonathan Frankel for evaluation and potential surgical intervention to help optimize her breathing.  All questions were answered in this regard accordingly.

## 2024-12-05 RX ORDER — LIDOCAINE/PRILOCAINE 2.5 %-2.5%
CREAM (GRAM) TOPICAL
Qty: 100 G | Refills: 3 | Status: SHIPPED | OUTPATIENT
Start: 2024-12-05

## 2024-12-10 DIAGNOSIS — I10 PRIMARY HYPERTENSION: ICD-10-CM

## 2024-12-10 DIAGNOSIS — R00.2 PALPITATIONS: ICD-10-CM

## 2024-12-11 RX ORDER — METOPROLOL SUCCINATE 25 MG/1
12.5 TABLET, EXTENDED RELEASE ORAL DAILY
Qty: 45 TABLET | Refills: 3 | Status: SHIPPED | OUTPATIENT
Start: 2024-12-11 | End: 2025-12-11

## 2024-12-11 NOTE — TELEPHONE ENCOUNTER
Received request for prescription refills from:   [facility] for patient.   Patient follows with Dr. BOB     Request is for   METOPROLOL SUCC 25 MG  1/2 TABLET DAILY    Is patient currently on medication yes     Last OV 7/31/24  Next OV 7/30/25     Pended for signing and sent to provider

## 2024-12-12 ENCOUNTER — OFFICE VISIT (OUTPATIENT)
Dept: FAMILY MEDICINE CLINIC | Age: 74
End: 2024-12-12

## 2024-12-12 VITALS
DIASTOLIC BLOOD PRESSURE: 70 MMHG | HEIGHT: 63 IN | SYSTOLIC BLOOD PRESSURE: 130 MMHG | WEIGHT: 162 LBS | BODY MASS INDEX: 28.7 KG/M2

## 2024-12-12 DIAGNOSIS — G57.62 MORTON'S NEUROMA OF LEFT FOOT: ICD-10-CM

## 2024-12-12 DIAGNOSIS — E66.3 OVERWEIGHT WITH BODY MASS INDEX (BMI) OF 29 TO 29.9 IN ADULT: ICD-10-CM

## 2024-12-12 DIAGNOSIS — I10 ESSENTIAL (PRIMARY) HYPERTENSION: ICD-10-CM

## 2024-12-12 DIAGNOSIS — F41.1 GAD (GENERALIZED ANXIETY DISORDER): ICD-10-CM

## 2024-12-12 DIAGNOSIS — F33.0 MAJOR DEPRESSIVE DISORDER, RECURRENT, MILD (HCC): Primary | ICD-10-CM

## 2024-12-12 DIAGNOSIS — M95.0 NASAL DEFECT: ICD-10-CM

## 2024-12-12 DIAGNOSIS — R45.4 IRRITABILITY: ICD-10-CM

## 2024-12-12 RX ORDER — PAROXETINE 20 MG/1
20 TABLET, FILM COATED ORAL DAILY
Qty: 90 TABLET | Refills: 3 | Status: SHIPPED | OUTPATIENT
Start: 2024-12-12

## 2024-12-12 RX ORDER — PHENTERMINE HYDROCHLORIDE 37.5 MG/1
37.5 TABLET ORAL
Qty: 30 TABLET | Refills: 2 | Status: SHIPPED | OUTPATIENT
Start: 2024-12-12 | End: 2025-01-11

## 2024-12-12 ASSESSMENT — ENCOUNTER SYMPTOMS
EYES NEGATIVE: 1
ALLERGIC/IMMUNOLOGIC NEGATIVE: 1
DIARRHEA: 0
RESPIRATORY NEGATIVE: 1
GASTROINTESTINAL NEGATIVE: 1
VOICE CHANGE: 0
SHORTNESS OF BREATH: 0
ANAL BLEEDING: 0
CONSTIPATION: 0
TROUBLE SWALLOWING: 0
RECTAL PAIN: 0
COLOR CHANGE: 0
ABDOMINAL PAIN: 0
BLOOD IN STOOL: 0

## 2024-12-12 NOTE — PROGRESS NOTES
to contact me to clarify.    On this date 12/12/2024 I have spent 35 minutes reviewing previous notes, test results and face to face with the patient discussing the diagnosis and importance of compliance with the treatment plan as well as documenting on the day of the visit.    The patient (or guardian, if applicable) and other individuals in attendance with the patient were advised that Artificial Intelligence will be utilized during this visit to record, process the conversation to generate a clinical note and to support improvement of the AI technology. The patient (or guardian, if applicable) and other individuals in attendance at the appointment consented to the use of AI, including the recording.      An electronic signature was used to authenticate this note.    --Jennifer Salazar, NINO - CNP

## 2025-02-05 ENCOUNTER — APPOINTMENT (OUTPATIENT)
Dept: ORTHOPEDIC SURGERY | Facility: CLINIC | Age: 75
End: 2025-02-05
Payer: MEDICARE

## 2025-02-05 VITALS — BODY MASS INDEX: 28.88 KG/M2 | WEIGHT: 163 LBS | HEIGHT: 63 IN

## 2025-02-05 DIAGNOSIS — M17.11 PRIMARY OSTEOARTHRITIS OF RIGHT KNEE: Primary | ICD-10-CM

## 2025-02-05 PROCEDURE — 99214 OFFICE O/P EST MOD 30 MIN: CPT | Performed by: ORTHOPAEDIC SURGERY

## 2025-02-05 PROCEDURE — 20610 DRAIN/INJ JOINT/BURSA W/O US: CPT | Performed by: ORTHOPAEDIC SURGERY

## 2025-02-05 PROCEDURE — 1036F TOBACCO NON-USER: CPT | Performed by: ORTHOPAEDIC SURGERY

## 2025-02-05 PROCEDURE — 3008F BODY MASS INDEX DOCD: CPT | Performed by: ORTHOPAEDIC SURGERY

## 2025-02-05 PROCEDURE — 1159F MED LIST DOCD IN RCRD: CPT | Performed by: ORTHOPAEDIC SURGERY

## 2025-02-05 PROCEDURE — 1160F RVW MEDS BY RX/DR IN RCRD: CPT | Performed by: ORTHOPAEDIC SURGERY

## 2025-02-05 RX ORDER — LIDOCAINE HYDROCHLORIDE 10 MG/ML
4 INJECTION, SOLUTION INFILTRATION; PERINEURAL
Status: COMPLETED | OUTPATIENT
Start: 2025-02-05 | End: 2025-02-05

## 2025-02-05 RX ORDER — BETAMETHASONE SODIUM PHOSPHATE AND BETAMETHASONE ACETATE 3; 3 MG/ML; MG/ML
2 INJECTION, SUSPENSION INTRA-ARTICULAR; INTRALESIONAL; INTRAMUSCULAR; SOFT TISSUE
Status: COMPLETED | OUTPATIENT
Start: 2025-02-05 | End: 2025-02-05

## 2025-02-05 RX ADMIN — LIDOCAINE HYDROCHLORIDE 4 ML: 10 INJECTION, SOLUTION INFILTRATION; PERINEURAL at 08:27

## 2025-02-05 RX ADMIN — BETAMETHASONE SODIUM PHOSPHATE AND BETAMETHASONE ACETATE 2 ML: 3; 3 INJECTION, SUSPENSION INTRA-ARTICULAR; INTRALESIONAL; INTRAMUSCULAR; SOFT TISSUE at 08:27

## 2025-02-05 NOTE — PROGRESS NOTES
History of present illness    74-year-old female follow-up of her right knee having increasing pain no new injury most of her soreness along the medial side she has a Baker's cyst and that gives her trouble as well.  She has not any recent treatment.      Past medical , Surgical, Family and social history reviewed.      Physical exam  General: No acute distress and breathing comfortably.  Patient is pleasant and cooperative with the examination.    Extremity  Right knee has crepitus with range of motion tenderness medial joint space no instability brisk cap refill Carton soft calf is nontender no signs of infection    Diagnostics      No results found.     Procedure  See dictated procedure note    Assessment  Right knee arthritis    Treatment plan  1.  The natural history of the condition and its associated treatment alternatives including surgical and nonsurgical options were discussed with the patient at length.  2.  Right knee cortisone injection performed today without complication.  Patient understands the cortisone may provide temporary relief how much it helps her how long it lasts is unpredictable.  Cortisone can be repeated after 3 months if symptoms return.  3. [   ]  4.  All of the patient's questions were answered.    Orders Placed This Encounter    Inj/Asp: Right knee       This note was prepared using voice recognition software.  The details of this note are correct and have been reviewed, and corrected to the best of my ability.  Some grammatical areas may persist related to the Dragon software    Shane De La Garza MD  Senior Attending Physician  [facility]  [facility]    [phone]    Inj/Asp: Right knee on 2/5/2025 8:27 AM  Indications: pain and diagnostic evaluation  Details: 22 G needle, anteromedial approach  Medications: 4 mL lidocaine 10 mg/mL (1 %); 2 mL betamethasone acet,sod phos 6 mg/mL  Outcome: tolerated well, no immediate  complications  Procedure, treatment alternatives, risks and benefits explained, specific risks discussed. Consent was given by the patient. Immediately prior to procedure a time out was called to verify the correct patient, procedure, equipment, support staff and site/side marked as required. Patient was prepped and draped in the usual sterile fashion.          Statement Selected

## 2025-02-17 DIAGNOSIS — I10 PRIMARY HYPERTENSION: ICD-10-CM

## 2025-02-17 RX ORDER — LOSARTAN POTASSIUM 50 MG/1
50 TABLET ORAL DAILY
Qty: 90 TABLET | Refills: 1 | Status: SHIPPED | OUTPATIENT
Start: 2025-02-17 | End: 2025-08-16

## 2025-02-17 NOTE — TELEPHONE ENCOUNTER
Rec'd fax from [facility]'s requesting refill of Losartan 50 mg daily.    Pt has pending appt 7/30/25 with Dr. Rdz

## 2025-04-29 ENCOUNTER — APPOINTMENT (OUTPATIENT)
Facility: CLINIC | Age: 75
End: 2025-04-29
Payer: MEDICARE

## 2025-04-29 DIAGNOSIS — J34.3 HYPERTROPHY OF INFERIOR NASAL TURBINATE: Primary | ICD-10-CM

## 2025-04-29 DIAGNOSIS — J34.89 NASAL SEPTAL PERFORATION: ICD-10-CM

## 2025-04-29 DIAGNOSIS — J34.8210 NASAL ALAR COLLAPSE: ICD-10-CM

## 2025-04-29 PROCEDURE — 1126F AMNT PAIN NOTED NONE PRSNT: CPT | Performed by: OTOLARYNGOLOGY

## 2025-04-29 PROCEDURE — 1159F MED LIST DOCD IN RCRD: CPT | Performed by: OTOLARYNGOLOGY

## 2025-04-29 PROCEDURE — 99214 OFFICE O/P EST MOD 30 MIN: CPT | Performed by: OTOLARYNGOLOGY

## 2025-04-29 PROCEDURE — 31231 NASAL ENDOSCOPY DX: CPT | Performed by: OTOLARYNGOLOGY

## 2025-04-29 RX ORDER — TRIAMCINOLONE ACETONIDE 55 UG/1
1 SPRAY, METERED NASAL 2 TIMES DAILY
Qty: 10.8 ML | Refills: 1 | Status: SHIPPED | OUTPATIENT
Start: 2025-04-29 | End: 2025-05-29

## 2025-04-29 ASSESSMENT — PATIENT HEALTH QUESTIONNAIRE - PHQ9
2. FEELING DOWN, DEPRESSED OR HOPELESS: NOT AT ALL
1. LITTLE INTEREST OR PLEASURE IN DOING THINGS: NOT AT ALL
2. FEELING DOWN, DEPRESSED OR HOPELESS: NOT AT ALL
1. LITTLE INTEREST OR PLEASURE IN DOING THINGS: NOT AT ALL
SUM OF ALL RESPONSES TO PHQ9 QUESTIONS 1 AND 2: 0
SUM OF ALL RESPONSES TO PHQ9 QUESTIONS 1 AND 2: 0

## 2025-04-29 ASSESSMENT — PAIN SCALES - GENERAL: PAINLEVEL_OUTOF10: 0-NO PAIN

## 2025-04-29 NOTE — LETTER
April 29, 2025     Edson Madrigal MD  [address]   [facility]  [facility] [address]    Patient: Francie Adames   YOB: 1950   Date of Visit: 4/29/2025       Dear Dr. Edson Mardigal MD:    Thank you for referring Francie Adames to me for evaluation. Below are my notes for this consultation.  If you have questions, please do not hesitate to call me. I look forward to following your patient along with you.       Sincerely,     Jonathan K Frankel, MD      CC: No Recipients  ______________________________________________________________________________________    History Of Present Illness  Francie Adames is a 75 y.o. female presenting with nasal obstruction.    Patient was kindly referred by Dr. Madrigal.  I personally reviewed his most recent note.    I personally reviewed the CT sinus showing a 1 cm x 8 mm mid septal perforation.  No evidence of prior sinus surgery.  No active sinus disease.  No significant septal deviation.  Patient has a history of open septorhinoplasty many years ago.    Patient states that she has left greater than right-sided nasal obstruction.  She notices this more at night and when she is exerting herself.  She has tried Flonase in the past without any noticeable improvement.  She does get crusting, and whistling.  Denies epistaxis.  She has tried oral antihistamines as well which she states dry her out.  She uses water to flush out her nose.  Occasional saline spray.  Patient states that she gets about 70 to 80% improvement with the use of Breathe Right strips.     Past Medical History  She has a past medical history of Personal history of other diseases of the musculoskeletal system and connective tissue (06/10/2022), Personal history of other diseases of the respiratory system, and Personal history of other specified conditions (06/10/2022).    Surgical History  She has no past surgical history on file.     Social History  She reports that she  has never smoked. She has never used smokeless tobacco. She reports that she does not drink alcohol and does not use drugs.    Family History  Family History[1]     Allergies  Lidocaine and Penicillins    Review of Systems   Constitutional: Negative.    HENT: Negative.     Eyes: Negative.    Respiratory: Negative.     Cardiovascular: Negative.    Gastrointestinal: Negative.    Endocrine: Negative.    Genitourinary: Negative.    Musculoskeletal: Negative.    Skin: Negative.    Allergic/Immunologic: Negative.    Neurological: Negative.    Hematological: Negative.    Psychiatric/Behavioral: Negative.     These systems were reviewed and are negative unless otherwise stated in the HPI      Physical Exam     Constitutional   General appearance: Healthy-appearing, well-nourished, well groomed, in no acute distress.      Voice  Ability to communicate: Normal communication without aids, normal voice quality    Head and Face   Head and face: Atraumatic with no masses, lesions, or scarring.    Salivary glands: No tenderness of the parotid glands or parotid masses. No tenderness of the submandibular glands or submandibular masses.    Facial strength: Normal strength and symmetry, no synkinesis or facial tic.     Eyes   Pupils and irises: EOM intact, PERRLA, conjunctiva non-injected.     Ears  External inspection of ears: Ears normally formed and free of lesions.     Nose   Dorsum Abnormal narrow middle vault.  Contour irregularities of the dorsum from prior rhinoplasty  No nasal lesions, lacerations or scars.  No polyps  Nasal Mucosa Abnormal dryness and mild amount of crusting around the septal perforation site  Nasal tip Abnormal bulbous nasal tip.  Wide and short lower lateral crural cartilages.  Asymmetry of the lower lateral crural cartilage.  Septum Abnormal mid septal perforation, 1 cm x 0.8 mm.  Inferior turbinates Abnormal bilateral inferior turbinate hypertrophy  Modified Rach maneuver Abnormal significant  improvement bilaterally      Oral Cavity/Mouth   Lips, teeth, and gums: Normal lips, gums, and dentition.     Throat   Oropharynx: Mucosa moist, no lesions. Hard and soft palate normal. Tongue normal, no lesions or edema. No tonsillar masses or lesions. Normal tongue base.    Neck   Neck: Symmetrical, trachea midline.    Thyroid symmetrical, no enlargement, no tenderness, no nodules.     Pulmonary   Respiratory effort: Chest expands symmetrically. No audible wheeze.      Cardiovascular   Peripheral vascular system: No varicosities, carotid pulse normal, no edema. No jugular venous distension    Lymphatic   Palpation of cervical lymph nodes: No palpable lymph node enlargement, no submandibular adenopathy, no anterior cervical adenopathy, no supraclavicular adenopathy    Neurological/Psychiatric   Cranial nerves: Cranial nerves II - XII intact. Normal gait. No nystagmus  Orientation to person, place, and time: Normal.     Mood and affect: Normal.      Extremities   Appearance of extremities: Normal.       Procedure  After verbal consent  a nasal endoscopy was performed.  No lesions in the nasal cavity, middle meatus or sphenoethmoid recess were seen.  No purulence was noted.      Abnormal mid septal perforation, 1 cm x 0.8 mm.  Mild amount of crusting at the perforation site.  No evidence of recent bleeding.  Evidence of prior sinus surgery.       Last Recorded Vitals  There were no vitals taken for this visit.    Relevant Results  Prior to Admission medications    Medication Sig Start Date End Date Taking? Authorizing Provider   CHOLECALCIFEROL, VITAMIN D3, ORAL Take by mouth once daily as needed.   Yes Historical Provider, MD   losartan (Cozaar) 50 mg tablet Take 1 tablet (50 mg) by mouth once daily. 2/17/25 8/16/25 Yes Shanti Rdz MD   metoprolol succinate XL (Toprol-XL) 25 mg 24 hr tablet Take 0.5 tablets (12.5 mg) by mouth once daily. 12/11/24 12/11/25 Yes Shanti Rdz MD   busPIRone (Buspar) 10 mg tablet Take  1 tablet (10 mg) by mouth 2 times a day.  Patient not taking: Reported on 4/29/2025 5/29/24   Historical Provider, MD   ibuprofen 800 mg tablet Take 1 tablet (800 mg) by mouth once daily as needed. PRN  Patient not taking: Reported on 4/29/2025 3/26/21   Historical Provider, MD   magnesium oxide (Mag-Ox) 400 mg tablet Take 1 tablet (400 mg) by mouth 2 times a day.  Patient not taking: Reported on 4/29/2025 7/31/24   Shanti Rdz MD   PARoxetine (Paxil) 10 mg tablet Take 1 tablet (10 mg) by mouth once daily in the morning.  Patient not taking: Reported on 4/29/2025    Historical Provider, MD     Imaging  No results found.    Cardiology, Vascular, and Other Imaging  No other imaging results found for the past 7 days         Assessment/Plan  Problem List Items Addressed This Visit          ENT    Nasal septal perforation    Nasal alar collapse    Hypertrophy of inferior nasal turbinate - Primary      Patient has a septal perforation, bilateral inferior turbinate hypertrophy and evidence of nasal valve insufficiency.  Patient is likely a candidate for septal perforation repair, inferior turbinate reduction and nasal valve repair.  This would likely involve use of irradiated cadaveric costal cartilage and temporalis fascia.  However we will begin with medical management.  Patient was encouraged to use saline spray on a frequent basis.  She was encouraged to use saline irrigation twice daily.  We will do a trial of Nasacort.  Patient was instructed on appropriate use.    Patient will follow-up in 6 to 8 weeks.  Patient agrees with the plan.           Jonathan K Frankel, MD  Facial Plastic & Reconstructive Surgery  Otolaryngology - Head & Neck Surgery       [1]  No family history on file.       [1]  No family history on file.

## 2025-04-29 NOTE — PATIENT INSTRUCTIONS
SINUS/ NASAL IRRIGATION INSTRUCTIONS  What is nasal irrigation and how does it help?  Nasal irrigation is really as simple as running a gentle saline solution through your nasal passages and sinuses. Irrigation is very helpful in patients with sinus problems because it helps the nose to flush out anything that can cause continued irritation, swelling, and nasal blockage. Following surgery, irrigation helps to wash out any blood clot or crusting and makes cleaning by the doctor in clinic much more comfortable and pleasant. Irrigation is also one of the most effective ways to get topical medications directly to the nasal lining where they are needed.    How do I make the solution?   You may purchase the NeilMed, or any other, over the counter sinus rinse system at your local pharmacy. Add 1 packet to the bottle and fill to the line with distilled, filtered, or boiled water at room temperature each time. In some cases, your doctor may ask you to add certain medications to the bottle.     Alternatively, you may use a bulb syringe, such as the kind used for infants, and follow the instructions below for making your own solution.  You may make your own saline rinse solution by using boiled or distilled water and placing this in a clean 1 liter container.   Add about 2-3 teaspoons of iodine free table salt (such as sea salt) and about 1 teaspoon of baking soda to the container.   Let cool to room temperature before use.  For a smaller recipe, you can add 1/4 teaspoon of salt (no iodine) and 1/4 teaspoon of baking soda to 8 ounces of water at room temperature.     How do I irrigate my nose?   Irrigation should be performed 1-2 times per day depending on your doctor's recommendations.    *Lean forward over a sink with your nose pointed slightly downwards and mouth open.    *Place the tip of the full irrigation bottle at the opening of the nostril and squeeze gently allowing the solution to enter the nose.   You will feel the  solution come out of the front of the nose and mouth and allow it to simply drain into the sink. Use half of the bottle for each nostril.     How do I clean the equipment?  You should clean the bottle daily with soap and water to make sure you are not reintroducing bacteria into the nose. The bottles may be placed in the  as well. Allow the bottle to cool and completely air dry before using again. After a few months, you may consider purchasing a replacement bottle.    Helpful tips:   *If you are unable to tolerate the squeeze bottle, may substitute with Ann-Pot for a more gentle irrigation   *It is okay to use Navage machine for irrigation if you prefer this method.   *Stop irrigating if you have to sneeze or cough.   *Do not inhale, speak, or swallow when irrigating as this could draw fluid into the middle ear or lung.   *At first you may find irrigations uncomfortable. Try warming the fluid slightly or change the amount of squeezing pressure.    After a while you should find irrigations quite pleasant.   *Video Tutorial: www.sinusvideos.com/videos/73/nasal-saline-irrigations http://www.sinusvideos.com/videos/73/nasal-saline-irrigations              NASAL SPRAY USE INSTRUCTIONS    · Blow your nose on both sides to clear any debris or mucous  · Prime and activate the delivery device as recommended by the   · Position your head by tilting forward (this aligns the medication more vertically and makes it easier to use all the medication from the bottom of the bottle each month)  · Aim the applicator tip about 30-45 degrees from the floor of the nose and direct the spray to the outer corner of the eye on the same side (right side to outside of the right eye,       left side to outside of left eye)       - This technique helps to avoid spraying the septum (structure that divides one side of the nose from the other) that can cause irritation and bleeding     - Do not spray straight up or straight  back into your nose         · NASAL STEROIDS (example: Flonase, Nasacort, Rhinocort) - Consistent utilization is important for maximal benefit (either 1 puff each nostril twice per day or 2 sprays each     nostril once per day)  · TOPICAL ANTIHISTAMINES (example: Azelastine, Patanase) and Ipratropium - can be utilized as needed for symptoms     “[facility] is pleased to meet your health care needs.  We strive to deliver the highest quality and most value based care possible.  Thank you for giving us the opportunity to serve you.”

## 2025-04-29 NOTE — PROGRESS NOTES
History Of Present Illness  Francie Adames is a 75 y.o. female presenting with nasal obstruction.    Patient was kindly referred by Dr. Madrigal.  I personally reviewed his most recent note.    I personally reviewed the CT sinus showing a 1 cm x 8 mm mid septal perforation.  No evidence of prior sinus surgery.  No active sinus disease.  No significant septal deviation.  Patient has a history of open septorhinoplasty many years ago.    Patient states that she has left greater than right-sided nasal obstruction.  She notices this more at night and when she is exerting herself.  She has tried Flonase in the past without any noticeable improvement.  She does get crusting, and whistling.  Denies epistaxis.  She has tried oral antihistamines as well which she states dry her out.  She uses water to flush out her nose.  Occasional saline spray.  Patient states that she gets about 70 to 80% improvement with the use of Breathe Right strips.     Past Medical History  She has a past medical history of Personal history of other diseases of the musculoskeletal system and connective tissue (06/10/2022), Personal history of other diseases of the respiratory system, and Personal history of other specified conditions (06/10/2022).    Surgical History  She has no past surgical history on file.     Social History  She reports that she has never smoked. She has never used smokeless tobacco. She reports that she does not drink alcohol and does not use drugs.    Family History  Family History[1]     Allergies  Lidocaine and Penicillins    Review of Systems   Constitutional: Negative.    HENT: Negative.     Eyes: Negative.    Respiratory: Negative.     Cardiovascular: Negative.    Gastrointestinal: Negative.    Endocrine: Negative.    Genitourinary: Negative.    Musculoskeletal: Negative.    Skin: Negative.    Allergic/Immunologic: Negative.    Neurological: Negative.    Hematological: Negative.    Psychiatric/Behavioral: Negative.     These  systems were reviewed and are negative unless otherwise stated in the HPI      Physical Exam     Constitutional   General appearance: Healthy-appearing, well-nourished, well groomed, in no acute distress.      Voice  Ability to communicate: Normal communication without aids, normal voice quality    Head and Face   Head and face: Atraumatic with no masses, lesions, or scarring.    Salivary glands: No tenderness of the parotid glands or parotid masses. No tenderness of the submandibular glands or submandibular masses.    Facial strength: Normal strength and symmetry, no synkinesis or facial tic.     Eyes   Pupils and irises: EOM intact, PERRLA, conjunctiva non-injected.     Ears  External inspection of ears: Ears normally formed and free of lesions.     Nose   Dorsum Abnormal narrow middle vault.  Contour irregularities of the dorsum from prior rhinoplasty  No nasal lesions, lacerations or scars.  No polyps  Nasal Mucosa Abnormal dryness and mild amount of crusting around the septal perforation site  Nasal tip Abnormal bulbous nasal tip.  Wide and short lower lateral crural cartilages.  Asymmetry of the lower lateral crural cartilage.  Septum Abnormal mid septal perforation, 1 cm x 0.8 mm.  Inferior turbinates Abnormal bilateral inferior turbinate hypertrophy  Modified Rach maneuver Abnormal significant improvement bilaterally      Oral Cavity/Mouth   Lips, teeth, and gums: Normal lips, gums, and dentition.     Throat   Oropharynx: Mucosa moist, no lesions. Hard and soft palate normal. Tongue normal, no lesions or edema. No tonsillar masses or lesions. Normal tongue base.    Neck   Neck: Symmetrical, trachea midline.    Thyroid symmetrical, no enlargement, no tenderness, no nodules.     Pulmonary   Respiratory effort: Chest expands symmetrically. No audible wheeze.      Cardiovascular   Peripheral vascular system: No varicosities, carotid pulse normal, no edema. No jugular venous distension    Lymphatic   Palpation  of cervical lymph nodes: No palpable lymph node enlargement, no submandibular adenopathy, no anterior cervical adenopathy, no supraclavicular adenopathy    Neurological/Psychiatric   Cranial nerves: Cranial nerves II - XII intact. Normal gait. No nystagmus  Orientation to person, place, and time: Normal.     Mood and affect: Normal.      Extremities   Appearance of extremities: Normal.       Procedure  After verbal consent  a nasal endoscopy was performed.  No lesions in the nasal cavity, middle meatus or sphenoethmoid recess were seen.  No purulence was noted.      Abnormal mid septal perforation, 1 cm x 0.8 mm.  Mild amount of crusting at the perforation site.  No evidence of recent bleeding.  Evidence of prior sinus surgery.       Last Recorded Vitals  There were no vitals taken for this visit.    Relevant Results  Prior to Admission medications    Medication Sig Start Date End Date Taking? Authorizing Provider   CHOLECALCIFEROL, VITAMIN D3, ORAL Take by mouth once daily as needed.   Yes Historical Provider, MD   losartan (Cozaar) 50 mg tablet Take 1 tablet (50 mg) by mouth once daily. 2/17/25 8/16/25 Yes Shanti Rdz MD   metoprolol succinate XL (Toprol-XL) 25 mg 24 hr tablet Take 0.5 tablets (12.5 mg) by mouth once daily. 12/11/24 12/11/25 Yes Shanti Rdz MD   busPIRone (Buspar) 10 mg tablet Take 1 tablet (10 mg) by mouth 2 times a day.  Patient not taking: Reported on 4/29/2025 5/29/24   Historical Provider, MD   ibuprofen 800 mg tablet Take 1 tablet (800 mg) by mouth once daily as needed. PRN  Patient not taking: Reported on 4/29/2025 3/26/21   Historical Provider, MD   magnesium oxide (Mag-Ox) 400 mg tablet Take 1 tablet (400 mg) by mouth 2 times a day.  Patient not taking: Reported on 4/29/2025 7/31/24   Shanti Rdz MD   PARoxetine (Paxil) 10 mg tablet Take 1 tablet (10 mg) by mouth once daily in the morning.  Patient not taking: Reported on 4/29/2025    Historical Provider, MD     Imaging  No results  found.    Cardiology, Vascular, and Other Imaging  No other imaging results found for the past 7 days         Assessment/Plan   Problem List Items Addressed This Visit          ENT    Nasal septal perforation    Nasal alar collapse    Hypertrophy of inferior nasal turbinate - Primary      Patient has a septal perforation, bilateral inferior turbinate hypertrophy and evidence of nasal valve insufficiency.  Patient is likely a candidate for septal perforation repair, inferior turbinate reduction and nasal valve repair.  This would likely involve use of irradiated cadaveric costal cartilage and temporalis fascia.  However we will begin with medical management.  Patient was encouraged to use saline spray on a frequent basis.  She was encouraged to use saline irrigation twice daily.  We will do a trial of Nasacort.  Patient was instructed on appropriate use.    Patient will follow-up in 6 to 8 weeks.  Patient agrees with the plan.    Patient believes she may have a lidocaine allergy.  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  She developed what she describes as significant swelling of her cheek and lips.  She has tolerated local anesthetic injections with her dentist and other forms of analgesia without issue.           Jonathan K Frankel, MD  Facial Plastic & Reconstructive Surgery  Otolaryngology - Head & Neck Surgery         [1] No family history on file.

## 2025-05-07 ENCOUNTER — APPOINTMENT (OUTPATIENT)
Dept: ORTHOPEDIC SURGERY | Facility: CLINIC | Age: 75
End: 2025-05-07
Payer: MEDICARE

## 2025-05-07 DIAGNOSIS — M17.11 PRIMARY OSTEOARTHRITIS OF RIGHT KNEE: Primary | ICD-10-CM

## 2025-05-07 PROCEDURE — 99214 OFFICE O/P EST MOD 30 MIN: CPT | Performed by: ORTHOPAEDIC SURGERY

## 2025-05-07 PROCEDURE — 20610 DRAIN/INJ JOINT/BURSA W/O US: CPT | Performed by: ORTHOPAEDIC SURGERY

## 2025-05-07 PROCEDURE — 1159F MED LIST DOCD IN RCRD: CPT | Performed by: ORTHOPAEDIC SURGERY

## 2025-05-07 PROCEDURE — 1036F TOBACCO NON-USER: CPT | Performed by: ORTHOPAEDIC SURGERY

## 2025-05-07 RX ORDER — BETAMETHASONE SODIUM PHOSPHATE AND BETAMETHASONE ACETATE 3; 3 MG/ML; MG/ML
2 INJECTION, SUSPENSION INTRA-ARTICULAR; INTRALESIONAL; INTRAMUSCULAR; SOFT TISSUE
Status: COMPLETED | OUTPATIENT
Start: 2025-05-07 | End: 2025-05-07

## 2025-05-07 RX ORDER — IBUPROFEN 800 MG/1
800 TABLET ORAL EVERY 8 HOURS PRN
Qty: 90 TABLET | Refills: 0 | Status: SHIPPED | OUTPATIENT
Start: 2025-05-07 | End: 2025-06-06

## 2025-05-07 RX ORDER — LIDOCAINE HYDROCHLORIDE 10 MG/ML
4 INJECTION, SOLUTION INFILTRATION; PERINEURAL
Status: COMPLETED | OUTPATIENT
Start: 2025-05-07 | End: 2025-05-07

## 2025-05-07 RX ADMIN — LIDOCAINE HYDROCHLORIDE 4 ML: 10 INJECTION, SOLUTION INFILTRATION; PERINEURAL at 08:25

## 2025-05-07 RX ADMIN — BETAMETHASONE SODIUM PHOSPHATE AND BETAMETHASONE ACETATE 2 ML: 3; 3 INJECTION, SUSPENSION INTRA-ARTICULAR; INTRALESIONAL; INTRAMUSCULAR; SOFT TISSUE at 08:25

## 2025-05-13 ENCOUNTER — TRANSCRIBE ORDERS (OUTPATIENT)
Dept: ORTHOPEDIC SURGERY | Facility: CLINIC | Age: 75
End: 2025-05-13
Payer: MEDICARE

## 2025-05-13 DIAGNOSIS — M17.11 UNILATERAL PRIMARY OSTEOARTHRITIS, RIGHT KNEE: Primary | ICD-10-CM

## 2025-06-05 ENCOUNTER — OFFICE VISIT (OUTPATIENT)
Dept: ORTHOPEDIC SURGERY | Facility: CLINIC | Age: 75
End: 2025-06-05
Payer: MEDICARE

## 2025-06-05 ENCOUNTER — HOSPITAL ENCOUNTER (OUTPATIENT)
Dept: RADIOLOGY | Facility: HOSPITAL | Age: 75
Discharge: HOME | End: 2025-06-05
Payer: MEDICARE

## 2025-06-05 DIAGNOSIS — M25.572 PAIN AND SWELLING OF LEFT ANKLE: ICD-10-CM

## 2025-06-05 DIAGNOSIS — M79.672 LEFT FOOT PAIN: ICD-10-CM

## 2025-06-05 DIAGNOSIS — S93.402A SPRAIN OF LEFT ANKLE, INITIAL ENCOUNTER: Primary | ICD-10-CM

## 2025-06-05 DIAGNOSIS — M25.472 PAIN AND SWELLING OF LEFT ANKLE: ICD-10-CM

## 2025-06-05 PROCEDURE — 73610 X-RAY EXAM OF ANKLE: CPT | Mod: LT

## 2025-06-05 PROCEDURE — 73630 X-RAY EXAM OF FOOT: CPT | Mod: LT

## 2025-06-05 PROCEDURE — 73630 X-RAY EXAM OF FOOT: CPT | Mod: LEFT SIDE | Performed by: RADIOLOGY

## 2025-06-05 PROCEDURE — 73610 X-RAY EXAM OF ANKLE: CPT | Mod: LEFT SIDE | Performed by: RADIOLOGY

## 2025-06-05 NOTE — PROGRESS NOTES
Acute Injury Established Patient Visit    Patient ID: Francie Adames is a 75 y.o. female  History of Present Illness  The patient is a 75-year-old female who presents with swelling in her left foot and ankle without any recent injury.    Left Foot and Ankle Swelling  - Swelling began last night and worsened by morning  - Associated with tightness and tenderness  - No recent trauma  - Compensates for right knee condition by favoring left side when descending stairs  - Ultrasound ruled out blood clot this morning  - History of hairline fracture in metatarsal treated by Dr. Johnson    Calf Tenderness  - Tenderness in calf  - Achilles tendon unaffected    Right Knee Condition  - Engages in yard work  - Uses knee brace  - No bone pain, some ligament discomfort  - History of varicose veins  - Scheduled for right knee surgery on 10/13/2025  - Interested in fluid drainage from knee    Supplemental information: None    SOCIAL HISTORY  - Works at a camp  - Engages in yard work       Assessment & Plan  1. Left foot and ankle sprain:  - X-rays show old avulsion injuries  - Provide tall boot for support  - Recommend rest, ice, elevation, and ibuprofen 800 mg with food  - Consider lace-up ankle brace if no improvement  - Monitor potential old fracture at fifth metatarsal with X-rays    2. Right knee Baker's cyst:  - Scheduled for surgery on 10/13/2025  - Discuss fluid drainage with Dr. De La Garza  - Advise consultation if discomfort persists    Follow-up:  - Follow up in 2 weeks or sooner if issues arise with repeat x-rays of the left foot and left ankle       Assessment:   Problem List Items Addressed This Visit    None  Visit Diagnoses         Sprain of left ankle, initial encounter    -  Primary    Relevant Orders    Walking Boot Tall    Ankle Brace, Lace Up or A60      Left foot pain        Relevant Orders    XR foot left 3+ views      Pain and swelling of left ankle        Relevant Orders    XR ankle left 3+ views             Diagnostics: Reviewed all relevant imaging including x-ray, MRI, CT, and US.    Results  Imaging   - X-ray: No acute fracture or dislocation, evidence of old avulsion injuries.    Diagnostic Testing   - Ultrasound: No blood clots.       Procedure:  Procedures    Physical Exam  - Musculoskeletal:    - Left foot and ankle: Swelling and ligament tenderness, primarily over the ATFL and CFL    - Left calf: Mild tenderness    - Left fifth metatarsal: Tenderness       Orders Placed This Encounter    Walking Boot Tall    Ankle Brace, Lace Up or A60    XR foot left 3+ views    XR ankle left 3+ views      At the conclusion of the visit there were no further questions by the patient/family regarding their plan of care.  Patient was instructed to call or return with any issues, questions, or concerns regarding their injury and/or treatment plan described above.     06/05/25 at 1:42 PM - rBandon Reyes DO    Office: [phone]    This note was prepared using voice recognition software.  The details of this note are correct and have been reviewed, and corrected to the best of my ability.  Some grammatical errors may persist related to the Dragon software.  This medical note was created with the assistance of artificial intelligence (AI) for documentation purposes. The content has been reviewed and confirmed by the healthcare provider for accuracy and completeness. Patient consented to the use of audio recording and use of AI during their visit.

## 2025-06-09 ASSESSMENT — PATIENT HEALTH QUESTIONNAIRE - PHQ9
6. FEELING BAD ABOUT YOURSELF - OR THAT YOU ARE A FAILURE OR HAVE LET YOURSELF OR YOUR FAMILY DOWN: NOT AT ALL
9. THOUGHTS THAT YOU WOULD BE BETTER OFF DEAD, OR OF HURTING YOURSELF: NOT AT ALL
5. POOR APPETITE OR OVEREATING: SEVERAL DAYS
8. MOVING OR SPEAKING SO SLOWLY THAT OTHER PEOPLE COULD HAVE NOTICED. OR THE OPPOSITE - BEING SO FIDGETY OR RESTLESS THAT YOU HAVE BEEN MOVING AROUND A LOT MORE THAN USUAL: NOT AT ALL
2. FEELING DOWN, DEPRESSED OR HOPELESS: NOT AT ALL
SUM OF ALL RESPONSES TO PHQ QUESTIONS 1-9: 2
3. TROUBLE FALLING OR STAYING ASLEEP: SEVERAL DAYS
2. FEELING DOWN, DEPRESSED OR HOPELESS: NOT AT ALL
9. THOUGHTS THAT YOU WOULD BE BETTER OFF DEAD, OR OF HURTING YOURSELF: NOT AT ALL
1. LITTLE INTEREST OR PLEASURE IN DOING THINGS: NOT AT ALL
SUM OF ALL RESPONSES TO PHQ QUESTIONS 1-9: 2
10. IF YOU CHECKED OFF ANY PROBLEMS, HOW DIFFICULT HAVE THESE PROBLEMS MADE IT FOR YOU TO DO YOUR WORK, TAKE CARE OF THINGS AT HOME, OR GET ALONG WITH OTHER PEOPLE: SOMEWHAT DIFFICULT
4. FEELING TIRED OR HAVING LITTLE ENERGY: NOT AT ALL
5. POOR APPETITE OR OVEREATING: SEVERAL DAYS
8. MOVING OR SPEAKING SO SLOWLY THAT OTHER PEOPLE COULD HAVE NOTICED. OR THE OPPOSITE, BEING SO FIGETY OR RESTLESS THAT YOU HAVE BEEN MOVING AROUND A LOT MORE THAN USUAL: NOT AT ALL
SUM OF ALL RESPONSES TO PHQ QUESTIONS 1-9: 2
3. TROUBLE FALLING OR STAYING ASLEEP: SEVERAL DAYS
10. IF YOU CHECKED OFF ANY PROBLEMS, HOW DIFFICULT HAVE THESE PROBLEMS MADE IT FOR YOU TO DO YOUR WORK, TAKE CARE OF THINGS AT HOME, OR GET ALONG WITH OTHER PEOPLE: SOMEWHAT DIFFICULT
1. LITTLE INTEREST OR PLEASURE IN DOING THINGS: NOT AT ALL
6. FEELING BAD ABOUT YOURSELF - OR THAT YOU ARE A FAILURE OR HAVE LET YOURSELF OR YOUR FAMILY DOWN: NOT AT ALL
4. FEELING TIRED OR HAVING LITTLE ENERGY: NOT AT ALL
SUM OF ALL RESPONSES TO PHQ QUESTIONS 1-9: 2
7. TROUBLE CONCENTRATING ON THINGS, SUCH AS READING THE NEWSPAPER OR WATCHING TELEVISION: NOT AT ALL
7. TROUBLE CONCENTRATING ON THINGS, SUCH AS READING THE NEWSPAPER OR WATCHING TELEVISION: NOT AT ALL
SUM OF ALL RESPONSES TO PHQ QUESTIONS 1-9: 2

## 2025-06-10 ENCOUNTER — OFFICE VISIT (OUTPATIENT)
Dept: ORTHOPEDIC SURGERY | Facility: CLINIC | Age: 75
End: 2025-06-10
Payer: MEDICARE

## 2025-06-10 DIAGNOSIS — M17.11 PRIMARY OSTEOARTHRITIS OF RIGHT KNEE: Primary | ICD-10-CM

## 2025-06-10 PROCEDURE — 99212 OFFICE O/P EST SF 10 MIN: CPT | Performed by: ORTHOPAEDIC SURGERY

## 2025-06-10 PROCEDURE — 99214 OFFICE O/P EST MOD 30 MIN: CPT | Performed by: ORTHOPAEDIC SURGERY

## 2025-06-10 RX ORDER — TIZANIDINE 4 MG/1
4 TABLET ORAL NIGHTLY PRN
Qty: 14 TABLET | Refills: 0 | Status: SHIPPED | OUTPATIENT
Start: 2025-06-10 | End: 2025-06-24

## 2025-06-10 RX ORDER — METHYLPREDNISOLONE 4 MG/1
TABLET ORAL
Qty: 21 TABLET | Refills: 0 | Status: SHIPPED | OUTPATIENT
Start: 2025-06-10

## 2025-06-10 NOTE — PROGRESS NOTES
Subjective    Patient ID: Francie    Chief Complaint:   Chief Complaint   Patient presents with    Right Knee - Follow-up     S/p Juan Jose 5/7/25  Scheduled for RT TKA     Left Foot - Follow-up     Seen RW - x-rays 6/5/25     History of present illness    75-year-old female presented to clinic today for follow-up evaluation right knee osteoarthritis.  She had a cortisone injection back in early May which gave minimal results.  She is scheduled for a right total knee replacement for the fall dated for 10/13/2025 at the [facility].  She is ambulating with a boot on the left foot/ankle for tendinitis.  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  She does notice some swelling of the right knee.  She complains of tightness with sitting to standing.  She takes Motrin which does seem to help a little bit but not significant.      Past medical , Surgical, Family and social history reviewed.      Physical exam  General: No acute distress and breathing comfortably.  Patient is pleasant and cooperative with the examination.    Extremity  Right knee is neurovascular intact.  The affected knee was examined and inspected and was tender to touch along the medial aspect.  The patient has catching/locking and occasional mechanical symptoms.  The skin was intact without breakdown or open wounds.  There was a mild Parish exam seen without evidence of instability in the collateral ligaments or in the anterior posterior plane.  There was a negative Lachman's test, pivot shift test, and posterior drawer sign.  There was no foot drop, numbness or tingling.  Sensation, reflexes, and pulses in the foot and ankle were present.  There was an effusion but range of motion was good and straight leg raise testing was normal.  Knee range of motion was 5 degrees of extension to 115 degrees of flexion.  The patient had the ability to bear weight but with discomfort.  The patient's  gait was antalgic secondary to the discomfort.    Diagnostics  [ none]  Imaging  XR ankle left 3+ views  Result Date: 6/6/2025  Small plantar calcaneal spur. Otherwise normal radiographs of the left ankle     MACRO: None   Signed by: Jose Alberto Kirkland 6/6/2025 7:07 PM Dictation workstation:   [id number]    XR foot left 3+ views  Result Date: 6/6/2025  Severe 1st MTP osteoarthritis. No acute abnormality seen   MACRO: None   Signed by: Jose Alberto Kirkland 6/6/2025 7:04 PM Dictation workstation:   [id number]      Cardiology, Vascular, and Other Imaging  No other imaging results found for the past 7 days       Procedure  [ none]    Assessment  Severe right knee osteoarthritis with varus deformity    Treatment plan  1.  At this time she will continue with weightbearing activity as tolerated.  2.  Prescription Medrol Dosepak and tizanidine sent to the pharmacy.  This will hopefully help her knee swelling and her left foot/ankle pain.  3.  She will follow-up with us as needed.  For complete plan and/or surgical details, please refer to Dr. De La Garza's portion of the split/shared dictation.  4.  All of the patient's questions were answered.    No orders of the defined types were placed in this encounter.      This note was prepared using voice recognition software.  The details of this note are correct and have been reviewed, and corrected to the best of my ability.  Some grammatical areas may persist related to the Dragon software    Lenny Henriquez PA-C, [facility]  [facility]    [phone]    In a face-to-face encounter performed today, BE De La Garza MD performed a history and physical examination, discussed pertinent diagnostic studies if indicated, and discussed diagnosis and management strategies with both the patient and the midlevel provider.  I reviewed the midlevel's note and agree with the documented findings and plan of care.  Greater than 50% of the evaluation and treatment  decision was performed by the physician myself during today's visit.    75-year-old female scheduled for knee replacement surgery on the right side in October and recent episode of pain in her left foot has been in a boot and now she is having swelling and pain in her right knee some tightness posteriorly no specific injury to her right knee she had a cortisone injection right knee back in May.  On examination right knee has tenderness medial joint space crepitus range of motion varus deformity brisk cap refill no sign infection compartments are soft calf is nontender.  Impression is acute exacerbation right knee arthritis.  Plan is Medrol Dosepak which is sent to pharmacy.  Prescription for tizanidine as well.  She will follow-up with me for her preop visit.          This note was prepared using voice recognition software.  The details of this note are correct and have been reviewed, and corrected to the best of my ability.  Some grammatical areas may persist related to the Dragon software    Shane De La Garza MD  Senior Attending Physician  [facility]    [phone]

## 2025-06-11 ENCOUNTER — TELEPHONE (OUTPATIENT)
Dept: ORTHOPEDIC SURGERY | Facility: CLINIC | Age: 75
End: 2025-06-11
Payer: MEDICARE

## 2025-06-11 NOTE — TELEPHONE ENCOUNTER
Ankle Lace Up: $144  Coinsurance: 80/20  Patient Out of Pocket: $28.80    Individual OOP:  $3900/$81 Met    This is what insurance is showing at this time. Insurance will cover the ankle brace at 80%. If the out of pocket was met completely then insurance will cover the brace at 100%

## 2025-06-12 ENCOUNTER — OFFICE VISIT (OUTPATIENT)
Age: 75
End: 2025-06-12

## 2025-06-12 VITALS
WEIGHT: 162 LBS | BODY MASS INDEX: 28.7 KG/M2 | DIASTOLIC BLOOD PRESSURE: 80 MMHG | SYSTOLIC BLOOD PRESSURE: 132 MMHG | HEIGHT: 63 IN

## 2025-06-12 DIAGNOSIS — E55.9 VITAMIN D DEFICIENCY: ICD-10-CM

## 2025-06-12 DIAGNOSIS — I27.20 PULMONARY HYPERTENSION (HCC): ICD-10-CM

## 2025-06-12 DIAGNOSIS — Z00.00 MEDICARE ANNUAL WELLNESS VISIT, SUBSEQUENT: ICD-10-CM

## 2025-06-12 DIAGNOSIS — M25.561 CHRONIC PAIN OF RIGHT KNEE: ICD-10-CM

## 2025-06-12 DIAGNOSIS — R11.0 NAUSEA: ICD-10-CM

## 2025-06-12 DIAGNOSIS — F41.9 ANXIETY: ICD-10-CM

## 2025-06-12 DIAGNOSIS — Z00.00 MEDICARE ANNUAL WELLNESS VISIT, SUBSEQUENT: Primary | ICD-10-CM

## 2025-06-12 DIAGNOSIS — F41.1 GAD (GENERALIZED ANXIETY DISORDER): ICD-10-CM

## 2025-06-12 DIAGNOSIS — G89.29 CHRONIC PAIN OF RIGHT KNEE: ICD-10-CM

## 2025-06-12 LAB
ALBUMIN SERPL-MCNC: 4.6 G/DL (ref 3.5–4.6)
ALP SERPL-CCNC: 101 U/L (ref 40–130)
ALT SERPL-CCNC: 17 U/L (ref 0–33)
ANION GAP SERPL CALCULATED.3IONS-SCNC: 14 MEQ/L (ref 9–15)
AST SERPL-CCNC: 20 U/L (ref 0–35)
BASOPHILS # BLD: 0.1 K/UL (ref 0–0.2)
BASOPHILS NFR BLD: 0.8 %
BILIRUB SERPL-MCNC: 0.3 MG/DL (ref 0.2–0.7)
BUN SERPL-MCNC: 24 MG/DL (ref 8–23)
CALCIUM SERPL-MCNC: 9.2 MG/DL (ref 8.5–9.9)
CHLORIDE SERPL-SCNC: 104 MEQ/L (ref 95–107)
CHOLEST SERPL-MCNC: 211 MG/DL (ref 0–199)
CO2 SERPL-SCNC: 22 MEQ/L (ref 20–31)
CREAT SERPL-MCNC: 0.59 MG/DL (ref 0.5–0.9)
EOSINOPHIL # BLD: 0 K/UL (ref 0–0.7)
EOSINOPHIL NFR BLD: 0.1 %
ERYTHROCYTE [DISTWIDTH] IN BLOOD BY AUTOMATED COUNT: 13.3 % (ref 11.5–14.5)
GLOBULIN SER CALC-MCNC: 2.6 G/DL (ref 2.3–3.5)
GLUCOSE SERPL-MCNC: 132 MG/DL (ref 70–99)
HCT VFR BLD AUTO: 40.6 % (ref 37–47)
HDLC SERPL-MCNC: 81 MG/DL (ref 40–59)
HGB BLD-MCNC: 13.5 G/DL (ref 12–16)
LDLC SERPL CALC-MCNC: 121 MG/DL (ref 0–129)
LYMPHOCYTES # BLD: 1.2 K/UL (ref 1–4.8)
LYMPHOCYTES NFR BLD: 15.7 %
MCH RBC QN AUTO: 31.5 PG (ref 27–31.3)
MCHC RBC AUTO-ENTMCNC: 33.3 % (ref 33–37)
MCV RBC AUTO: 94.9 FL (ref 79.4–94.8)
MONOCYTES # BLD: 0.5 K/UL (ref 0.2–0.8)
MONOCYTES NFR BLD: 6.1 %
NEUTROPHILS # BLD: 5.9 K/UL (ref 1.4–6.5)
NEUTS SEG NFR BLD: 76.8 %
PLATELET # BLD AUTO: 272 K/UL (ref 130–400)
POTASSIUM SERPL-SCNC: 4.4 MEQ/L (ref 3.4–4.9)
PROT SERPL-MCNC: 7.2 G/DL (ref 6.3–8)
RBC # BLD AUTO: 4.28 M/UL (ref 4.2–5.4)
SODIUM SERPL-SCNC: 140 MEQ/L (ref 135–144)
TRIGL SERPL-MCNC: 43 MG/DL (ref 0–150)
TSH SERPL-MCNC: 1.01 UIU/ML (ref 0.44–3.86)
WBC # BLD AUTO: 7.7 K/UL (ref 4.8–10.8)

## 2025-06-12 SDOH — ECONOMIC STABILITY: FOOD INSECURITY: WITHIN THE PAST 12 MONTHS, YOU WORRIED THAT YOUR FOOD WOULD RUN OUT BEFORE YOU GOT MONEY TO BUY MORE.: NEVER TRUE

## 2025-06-12 SDOH — ECONOMIC STABILITY: FOOD INSECURITY: WITHIN THE PAST 12 MONTHS, THE FOOD YOU BOUGHT JUST DIDN'T LAST AND YOU DIDN'T HAVE MONEY TO GET MORE.: NEVER TRUE

## 2025-06-12 ASSESSMENT — PATIENT HEALTH QUESTIONNAIRE - PHQ9
8. MOVING OR SPEAKING SO SLOWLY THAT OTHER PEOPLE COULD HAVE NOTICED. OR THE OPPOSITE, BEING SO FIGETY OR RESTLESS THAT YOU HAVE BEEN MOVING AROUND A LOT MORE THAN USUAL: NOT AT ALL
SUM OF ALL RESPONSES TO PHQ QUESTIONS 1-9: 0
SUM OF ALL RESPONSES TO PHQ QUESTIONS 1-9: 0
3. TROUBLE FALLING OR STAYING ASLEEP: NOT AT ALL
2. FEELING DOWN, DEPRESSED OR HOPELESS: NOT AT ALL
6. FEELING BAD ABOUT YOURSELF - OR THAT YOU ARE A FAILURE OR HAVE LET YOURSELF OR YOUR FAMILY DOWN: NOT AT ALL
7. TROUBLE CONCENTRATING ON THINGS, SUCH AS READING THE NEWSPAPER OR WATCHING TELEVISION: NOT AT ALL
SUM OF ALL RESPONSES TO PHQ QUESTIONS 1-9: 0
4. FEELING TIRED OR HAVING LITTLE ENERGY: NOT AT ALL
9. THOUGHTS THAT YOU WOULD BE BETTER OFF DEAD, OR OF HURTING YOURSELF: NOT AT ALL
1. LITTLE INTEREST OR PLEASURE IN DOING THINGS: NOT AT ALL
SUM OF ALL RESPONSES TO PHQ QUESTIONS 1-9: 0
5. POOR APPETITE OR OVEREATING: NOT AT ALL
10. IF YOU CHECKED OFF ANY PROBLEMS, HOW DIFFICULT HAVE THESE PROBLEMS MADE IT FOR YOU TO DO YOUR WORK, TAKE CARE OF THINGS AT HOME, OR GET ALONG WITH OTHER PEOPLE: NOT DIFFICULT AT ALL

## 2025-06-12 ASSESSMENT — LIFESTYLE VARIABLES
HOW MANY STANDARD DRINKS CONTAINING ALCOHOL DO YOU HAVE ON A TYPICAL DAY: PATIENT DOES NOT DRINK
HOW OFTEN DO YOU HAVE A DRINK CONTAINING ALCOHOL: NEVER

## 2025-06-12 NOTE — PROGRESS NOTES
Behavior is cooperative.                   Allergies   Allergen Reactions    Lidocaine Angioedema    Penicillins Hives     Prior to Visit Medications    Medication Sig Taking? Authorizing Provider   PARoxetine (PAXIL) 20 MG tablet Take 1 tablet by mouth daily Yes Jennifer Salazar APRN - CNP   lidocaine-prilocaine (EMLA) 2.5-2.5 % cream Apply topically of the neck when needed Yes Shaun Vázquez MD   topiramate (TOPAMAX) 50 MG tablet TAKE 1 TABLET BY MOUTH TWICE DAILY Yes Jennifer Salazar APRN - CNP   metoprolol succinate (TOPROL XL) 25 MG extended release tablet Take 0.5 tablets by mouth daily From [facility] for PALP Yes Jennifer Salazar APRN - CNP   busPIRone (BUSPAR) 10 MG tablet TAKE 1 TABLET BY MOUTH TWICE DAILY Yes Shaun Vázquez MD   losartan (COZAAR) 50 MG tablet Take 1 tablet by mouth daily Yes ProviderJessica MD   Cholecalciferol (VITAMIN D3) 50 MCG (2000 UT) TABS TAKE 1 TABLET BY MOUTH DAILY Yes Jennifer Salazar APRN - CNP   ibuprofen (ADVIL;MOTRIN) 800 MG tablet TAKE 1 TABLET BY MOUTH THREE TIMES DAILY AFTER MEALS Yes ProviderJessica MD   magnesium oxide (MAG-OX) 400 (240 Mg) MG tablet Take 1 tablet by mouth daily Yes Brii Covarrubias APRN - CNP       CareTeam (Including outside providers/suppliers regularly involved in providing care):   Patient Care Team:  Jennifer Salazar APRN - CNP as PCP - General (Nurse Practitioner, Family)  Jennifer Salazar APRN - CNP as PCP - Empaneled Provider  Shaun Vázquez MD as Consulting Physician (Neurology)     Recommendations for Preventive Services Due: see orders and patient instructions/AVS.  Recommended screening schedule for the next 5-10 years is provided to the patient in written form: see Patient Instructions/AVS.     Reviewed and updated this visit:  Tobacco  Allergies  Meds  Sexual Hx                  The patient (or guardian, if applicable) and other individuals in attendance with the patient were advised that Artificial

## 2025-06-13 LAB
ESTIMATED AVERAGE GLUCOSE: 117 MG/DL
HBA1C MFR BLD: 5.7 % (ref 4–6)
VITAMIN D 25-HYDROXY: 21.5 NG/ML (ref 30–100)

## 2025-06-20 ENCOUNTER — APPOINTMENT (OUTPATIENT)
Dept: ORTHOPEDIC SURGERY | Facility: CLINIC | Age: 75
End: 2025-06-20
Payer: MEDICARE

## 2025-06-20 ENCOUNTER — HOSPITAL ENCOUNTER (OUTPATIENT)
Dept: RADIOLOGY | Facility: HOSPITAL | Age: 75
Discharge: HOME | End: 2025-06-20
Payer: MEDICARE

## 2025-06-20 DIAGNOSIS — S93.402A SPRAIN OF LEFT ANKLE, INITIAL ENCOUNTER: ICD-10-CM

## 2025-06-20 PROCEDURE — 73630 X-RAY EXAM OF FOOT: CPT | Mod: LT

## 2025-06-20 PROCEDURE — 73610 X-RAY EXAM OF ANKLE: CPT | Mod: LT

## 2025-06-20 NOTE — PROGRESS NOTES
Follow-Up Patient Visit  Patient ID: Francie Adames is a 75 y.o. female.    History of Present Illness  The patient is a 75-year-old female here for follow-up of her left foot and ankle sprain.    She reports an improvement in her condition, with no current swelling. She attributes this progress to immobilization. She has been cautious in her movements, walking slowly to avoid unnecessary stress on the affected area. She has refrained from any strenuous activities or work. She has been using a tall boot for support but found it too heavy, leading to instances of falling. Consequently, she has been using a lighter brace. She also possesses an ankle brace.    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    SOCIAL HISTORY  Occupations: Works at a [employer] for kids [employer] disabilities  Exercise: Works out in the yard    Assessment & Plan  1. Left foot and ankle sprain:    A lace-up ankle brace will be provided for additional support. Gradually transition out of the boot over the next few days and resume normal activities. Use the brace when working in the yard or on uneven terrain for the next couple of weeks. If the condition remains stable, discontinue the use of the brace. If discomfort occurs, revert to using the boot. Maintain a good range of motion without overexertion.    Follow-up  Follow up as needed.       Orders Placed This Encounter    XR foot right 3+ views    XR ankle left 3+ views    XR foot left 3+ views       1. Sprain of left ankle, initial encounter        Procedures    Physical Exam  Musculoskeletal:  Left foot and ankle: No pain on palpation, no swelling or bruising noted  Achilles: Normal  Ankle: Normal    Results  Imaging   - X-ray of the left foot and ankle: Shows old avulsion injuries, nothing obviously new.   - X-ray of the left foot: Severe first MTP osteoarthritis, some degenerative changes into the midfoot. No obvious displaced  fracture or dislocation.   - X-ray of the left ankle: 06/20/2025, Redemonstrate some old avulsion type injuries. No obvious new or healing fractures noted.    At the conclusion of the visit there were no further questions by the patient/family regarding their plan of care.  Patient was instructed to call or return with any issues, questions, or concerns regarding their injury and/or treatment plan described above.      This medical note was created with the assistance of artificial intelligence (AI) for documentation purposes. The content has been reviewed and confirmed by the healthcare provider for accuracy and completeness. Patient consented to the use of audio recording and use of AI during their visit.   06/20/25 at 3:38 PM - Brandon Reyes DO  Office:  [phone]

## 2025-07-21 ENCOUNTER — TELEPHONE (OUTPATIENT)
Dept: ORTHOPEDIC SURGERY | Facility: CLINIC | Age: 75
End: 2025-07-21
Payer: MEDICARE

## 2025-07-21 DIAGNOSIS — M17.11 PRIMARY OSTEOARTHRITIS OF RIGHT KNEE: ICD-10-CM

## 2025-07-22 DIAGNOSIS — M17.11 PRIMARY OSTEOARTHRITIS OF RIGHT KNEE: ICD-10-CM

## 2025-07-23 RX ORDER — TIZANIDINE 4 MG/1
4 TABLET ORAL NIGHTLY PRN
Qty: 14 TABLET | Refills: 0 | Status: SHIPPED | OUTPATIENT
Start: 2025-07-23 | End: 2025-08-06

## 2025-07-24 RX ORDER — TIZANIDINE 4 MG/1
TABLET ORAL
Qty: 14 TABLET | Refills: 0 | Status: SHIPPED | OUTPATIENT
Start: 2025-07-24

## 2025-07-28 ENCOUNTER — APPOINTMENT (OUTPATIENT)
Facility: CLINIC | Age: 75
End: 2025-07-28
Payer: MEDICARE

## 2025-07-30 ENCOUNTER — APPOINTMENT (OUTPATIENT)
Dept: CARDIOLOGY | Facility: CLINIC | Age: 75
End: 2025-07-30
Payer: MEDICARE

## 2025-08-16 DIAGNOSIS — I10 PRIMARY HYPERTENSION: ICD-10-CM

## 2025-08-18 DIAGNOSIS — I10 PRIMARY HYPERTENSION: ICD-10-CM

## 2025-08-18 RX ORDER — LOSARTAN POTASSIUM 50 MG/1
50 TABLET ORAL DAILY
Qty: 30 TABLET | Refills: 1 | Status: SHIPPED | OUTPATIENT
Start: 2025-08-18 | End: 2025-08-19

## 2025-08-18 RX ORDER — LOSARTAN POTASSIUM 50 MG/1
50 TABLET ORAL DAILY
Qty: 45 TABLET | Refills: 0 | Status: SHIPPED | OUTPATIENT
Start: 2025-08-18 | End: 2025-08-18

## 2025-08-19 DIAGNOSIS — I10 PRIMARY HYPERTENSION: ICD-10-CM

## 2025-08-19 RX ORDER — LOSARTAN POTASSIUM 50 MG/1
50 TABLET ORAL DAILY
Qty: 90 TABLET | Refills: 1 | Status: SHIPPED | OUTPATIENT
Start: 2025-08-19 | End: 2026-02-15

## 2025-09-24 ENCOUNTER — APPOINTMENT (OUTPATIENT)
Dept: CARDIOLOGY | Facility: CLINIC | Age: 75
End: 2025-09-24
Payer: MEDICARE